# Patient Record
Sex: FEMALE | Race: WHITE | Employment: OTHER | URBAN - METROPOLITAN AREA
[De-identification: names, ages, dates, MRNs, and addresses within clinical notes are randomized per-mention and may not be internally consistent; named-entity substitution may affect disease eponyms.]

---

## 2017-04-13 ENCOUNTER — HOSPITAL ENCOUNTER (EMERGENCY)
Age: 22
Discharge: HOME | End: 2017-04-13
Payer: MEDICAID

## 2017-04-13 VITALS
TEMPERATURE: 98.2 F | HEART RATE: 82 BPM | DIASTOLIC BLOOD PRESSURE: 70 MMHG | RESPIRATION RATE: 18 BRPM | WEIGHT: 125 LBS | OXYGEN SATURATION: 100 % | SYSTOLIC BLOOD PRESSURE: 109 MMHG | BODY MASS INDEX: 22.15 KG/M2 | HEIGHT: 63 IN

## 2017-04-13 DIAGNOSIS — M54.32 SCIATICA OF LEFT SIDE: Primary | ICD-10-CM

## 2017-04-13 PROCEDURE — 6370000000 HC RX 637 (ALT 250 FOR IP): Performed by: NURSE PRACTITIONER

## 2017-04-13 PROCEDURE — 99283 EMERGENCY DEPT VISIT LOW MDM: CPT

## 2017-04-13 RX ORDER — HYDROCODONE BITARTRATE AND ACETAMINOPHEN 5; 325 MG/1; MG/1
1 TABLET ORAL EVERY 6 HOURS PRN
Qty: 10 TABLET | Refills: 0 | Status: SHIPPED | OUTPATIENT
Start: 2017-04-13 | End: 2017-04-20

## 2017-04-13 RX ORDER — HYDROCODONE BITARTRATE AND ACETAMINOPHEN 5; 325 MG/1; MG/1
1 TABLET ORAL ONCE
Status: COMPLETED | OUTPATIENT
Start: 2017-04-13 | End: 2017-04-13

## 2017-04-13 RX ORDER — CYCLOBENZAPRINE HCL 10 MG
10 TABLET ORAL 3 TIMES DAILY PRN
Qty: 15 TABLET | Refills: 0 | Status: SHIPPED | OUTPATIENT
Start: 2017-04-13 | End: 2018-03-18

## 2017-04-13 RX ORDER — CYCLOBENZAPRINE HCL 10 MG
10 TABLET ORAL ONCE
Status: COMPLETED | OUTPATIENT
Start: 2017-04-13 | End: 2017-04-13

## 2017-04-13 RX ORDER — NAPROXEN 500 MG/1
500 TABLET ORAL 2 TIMES DAILY
Qty: 20 TABLET | Refills: 0 | Status: SHIPPED | OUTPATIENT
Start: 2017-04-13 | End: 2018-03-18

## 2017-04-13 RX ORDER — NAPROXEN 500 MG/1
500 TABLET ORAL ONCE
Status: COMPLETED | OUTPATIENT
Start: 2017-04-13 | End: 2017-04-13

## 2017-04-13 RX ADMIN — NAPROXEN 500 MG: 500 TABLET ORAL at 18:57

## 2017-04-13 RX ADMIN — CYCLOBENZAPRINE HYDROCHLORIDE 10 MG: 10 TABLET, FILM COATED ORAL at 18:57

## 2017-04-13 RX ADMIN — HYDROCODONE BITARTATE AND ACETAMINOPHEN 1 TABLET: 5; 325 TABLET ORAL at 19:05

## 2017-04-13 ASSESSMENT — PAIN SCALES - GENERAL
PAINLEVEL_OUTOF10: 7
PAINLEVEL_OUTOF10: 8
PAINLEVEL_OUTOF10: 10

## 2017-04-13 ASSESSMENT — PAIN DESCRIPTION - ORIENTATION: ORIENTATION: LEFT

## 2017-04-13 ASSESSMENT — ENCOUNTER SYMPTOMS
BACK PAIN: 0
COUGH: 0
SHORTNESS OF BREATH: 0
ABDOMINAL PAIN: 0

## 2017-04-13 ASSESSMENT — PAIN DESCRIPTION - FREQUENCY: FREQUENCY: CONTINUOUS

## 2017-04-13 ASSESSMENT — PAIN DESCRIPTION - DESCRIPTORS: DESCRIPTORS: STABBING

## 2017-04-13 ASSESSMENT — PAIN DESCRIPTION - LOCATION: LOCATION: HIP

## 2017-04-24 ENCOUNTER — HOSPITAL ENCOUNTER (EMERGENCY)
Age: 22
Discharge: HOME | End: 2017-04-24

## 2017-04-24 ENCOUNTER — APPOINTMENT (OUTPATIENT)
Dept: ULTRASOUND IMAGING | Age: 22
End: 2017-04-24

## 2017-04-24 VITALS
DIASTOLIC BLOOD PRESSURE: 68 MMHG | SYSTOLIC BLOOD PRESSURE: 115 MMHG | HEART RATE: 71 BPM | WEIGHT: 125 LBS | RESPIRATION RATE: 17 BRPM | HEIGHT: 63 IN | OXYGEN SATURATION: 100 % | BODY MASS INDEX: 22.15 KG/M2 | TEMPERATURE: 98.9 F

## 2017-04-24 DIAGNOSIS — M54.32 BILATERAL SCIATICA: Primary | ICD-10-CM

## 2017-04-24 DIAGNOSIS — M54.31 BILATERAL SCIATICA: Primary | ICD-10-CM

## 2017-04-24 LAB
ALBUMIN SERPL-MCNC: 4.4 G/DL (ref 3.9–4.9)
ALP BLD-CCNC: 61 U/L (ref 40–130)
ALT SERPL-CCNC: 11 U/L (ref 0–33)
ANION GAP SERPL CALCULATED.3IONS-SCNC: 11 MEQ/L (ref 7–13)
AST SERPL-CCNC: 13 U/L (ref 0–35)
BILIRUB SERPL-MCNC: 0.2 MG/DL (ref 0–1.2)
BUN BLDV-MCNC: 14 MG/DL (ref 6–20)
CALCIUM SERPL-MCNC: 9.8 MG/DL (ref 8.6–10.2)
CHLORIDE BLD-SCNC: 103 MEQ/L (ref 98–107)
CO2: 25 MEQ/L (ref 22–29)
CREAT SERPL-MCNC: 0.82 MG/DL (ref 0.5–0.9)
GFR AFRICAN AMERICAN: >60
GFR NON-AFRICAN AMERICAN: >60
GLOBULIN: 2.5 G/DL (ref 2.3–3.5)
GLUCOSE BLD-MCNC: 76 MG/DL (ref 74–109)
MAGNESIUM: 2.2 MG/DL (ref 1.7–2.3)
POTASSIUM SERPL-SCNC: 3.8 MEQ/L (ref 3.5–5.1)
SODIUM BLD-SCNC: 139 MEQ/L (ref 132–144)
TOTAL PROTEIN: 6.9 G/DL (ref 6.4–8.1)

## 2017-04-24 PROCEDURE — 93970 EXTREMITY STUDY: CPT

## 2017-04-24 PROCEDURE — 80053 COMPREHEN METABOLIC PANEL: CPT

## 2017-04-24 PROCEDURE — 83735 ASSAY OF MAGNESIUM: CPT

## 2017-04-24 PROCEDURE — 99283 EMERGENCY DEPT VISIT LOW MDM: CPT

## 2017-04-24 PROCEDURE — 36415 COLL VENOUS BLD VENIPUNCTURE: CPT

## 2017-04-24 RX ORDER — HYDROCODONE BITARTRATE AND ACETAMINOPHEN 5; 325 MG/1; MG/1
1 TABLET ORAL EVERY 8 HOURS PRN
Qty: 15 TABLET | Refills: 0 | Status: SHIPPED | OUTPATIENT
Start: 2017-04-24 | End: 2017-05-01

## 2017-04-24 RX ORDER — ORPHENADRINE CITRATE 30 MG/ML
60 INJECTION INTRAMUSCULAR; INTRAVENOUS ONCE
Status: DISCONTINUED | OUTPATIENT
Start: 2017-04-24 | End: 2017-04-25 | Stop reason: HOSPADM

## 2017-04-24 RX ORDER — KETOROLAC TROMETHAMINE 30 MG/ML
60 INJECTION, SOLUTION INTRAMUSCULAR; INTRAVENOUS ONCE
Status: DISCONTINUED | OUTPATIENT
Start: 2017-04-24 | End: 2017-04-25 | Stop reason: HOSPADM

## 2017-04-24 RX ORDER — TIZANIDINE HYDROCHLORIDE 4 MG/1
4 CAPSULE, GELATIN COATED ORAL 3 TIMES DAILY
Qty: 21 CAPSULE | Refills: 0 | Status: SHIPPED | OUTPATIENT
Start: 2017-04-24 | End: 2018-03-18

## 2017-04-24 RX ORDER — PREDNISONE 10 MG/1
60 TABLET ORAL DAILY
Qty: 30 TABLET | Refills: 0 | Status: SHIPPED | OUTPATIENT
Start: 2017-04-24 | End: 2017-04-29

## 2017-04-24 RX ORDER — METHYLPREDNISOLONE ACETATE 80 MG/ML
80 INJECTION, SUSPENSION INTRA-ARTICULAR; INTRALESIONAL; INTRAMUSCULAR; SOFT TISSUE ONCE
Status: DISCONTINUED | OUTPATIENT
Start: 2017-04-24 | End: 2017-04-25 | Stop reason: HOSPADM

## 2017-04-24 ASSESSMENT — PAIN SCALES - GENERAL
PAINLEVEL_OUTOF10: 8
PAINLEVEL_OUTOF10: 8

## 2017-04-24 ASSESSMENT — ENCOUNTER SYMPTOMS
EYE PAIN: 0
COLOR CHANGE: 0
APNEA: 0
SHORTNESS OF BREATH: 0
ABDOMINAL PAIN: 0
TROUBLE SWALLOWING: 0
ALLERGIC/IMMUNOLOGIC NEGATIVE: 1

## 2017-04-24 ASSESSMENT — PAIN DESCRIPTION - LOCATION: LOCATION: BACK;LEG

## 2017-04-24 ASSESSMENT — PAIN DESCRIPTION - DESCRIPTORS: DESCRIPTORS: ACHING

## 2017-04-24 ASSESSMENT — PAIN DESCRIPTION - PAIN TYPE: TYPE: ACUTE PAIN

## 2017-04-24 ASSESSMENT — PAIN DESCRIPTION - ORIENTATION: ORIENTATION: RIGHT;LEFT;LOWER

## 2017-07-24 ENCOUNTER — APPOINTMENT (OUTPATIENT)
Dept: GENERAL RADIOLOGY | Age: 22
End: 2017-07-24
Payer: MEDICAID

## 2017-07-24 ENCOUNTER — HOSPITAL ENCOUNTER (EMERGENCY)
Age: 22
Discharge: HOME | End: 2017-07-24
Payer: MEDICAID

## 2017-07-24 VITALS
HEART RATE: 81 BPM | DIASTOLIC BLOOD PRESSURE: 76 MMHG | TEMPERATURE: 97.7 F | WEIGHT: 125 LBS | HEIGHT: 63 IN | SYSTOLIC BLOOD PRESSURE: 117 MMHG | OXYGEN SATURATION: 97 % | RESPIRATION RATE: 18 BRPM | BODY MASS INDEX: 22.15 KG/M2
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
## 2017-07-24 DIAGNOSIS — S20.219A CONTUSION OF CHEST WALL, UNSPECIFIED LATERALITY, INITIAL ENCOUNTER: ICD-10-CM

## 2017-07-24 PROCEDURE — 71020 XR CHEST STANDARD TWO VW: CPT

## 2017-07-24 PROCEDURE — 99283 EMERGENCY DEPT VISIT LOW MDM: CPT

## 2017-07-24 RX ORDER — KETOROLAC TROMETHAMINE 30 MG/ML
60 INJECTION, SOLUTION INTRAMUSCULAR; INTRAVENOUS ONCE
Status: DISCONTINUED | OUTPATIENT
Start: 2017-07-24 | End: 2017-07-24 | Stop reason: HOSPADM

## 2017-07-24 RX ORDER — IBUPROFEN 800 MG/1
800 TABLET ORAL EVERY 8 HOURS PRN
Qty: 12 TABLET | Refills: 0 | Status: SHIPPED | OUTPATIENT
Start: 2017-07-24 | End: 2018-03-18

## 2017-07-24 ASSESSMENT — PAIN DESCRIPTION - DESCRIPTORS: DESCRIPTORS: SORE

## 2017-07-24 ASSESSMENT — PAIN DESCRIPTION - ORIENTATION: ORIENTATION: MID

## 2017-07-24 ASSESSMENT — PAIN SCALES - GENERAL: PAINLEVEL_OUTOF10: 10

## 2017-07-24 ASSESSMENT — PAIN DESCRIPTION - LOCATION: LOCATION: CHEST

## 2017-07-24 ASSESSMENT — ENCOUNTER SYMPTOMS
SHORTNESS OF BREATH: 0
COUGH: 0

## 2017-07-24 ASSESSMENT — PAIN DESCRIPTION - PAIN TYPE: TYPE: ACUTE PAIN

## 2017-11-23 ENCOUNTER — HOSPITAL ENCOUNTER (EMERGENCY)
Age: 22
Discharge: HOME | End: 2017-11-23
Payer: MEDICAID

## 2017-11-23 VITALS
TEMPERATURE: 98.1 F | HEART RATE: 90 BPM | OXYGEN SATURATION: 98 % | RESPIRATION RATE: 18 BRPM | WEIGHT: 125 LBS | HEIGHT: 63 IN | DIASTOLIC BLOOD PRESSURE: 65 MMHG | BODY MASS INDEX: 22.15 KG/M2 | SYSTOLIC BLOOD PRESSURE: 110 MMHG

## 2017-11-23 DIAGNOSIS — J40 BRONCHITIS: Primary | ICD-10-CM

## 2017-11-23 DIAGNOSIS — F41.1 ANXIETY STATE: ICD-10-CM

## 2017-11-23 PROCEDURE — 99283 EMERGENCY DEPT VISIT LOW MDM: CPT

## 2017-11-23 RX ORDER — HYDROXYZINE PAMOATE 25 MG/1
25 CAPSULE ORAL 3 TIMES DAILY PRN
Qty: 10 CAPSULE | Refills: 0 | Status: SHIPPED | OUTPATIENT
Start: 2017-11-23 | End: 2017-11-26

## 2017-11-23 RX ORDER — AZITHROMYCIN 250 MG/1
TABLET, FILM COATED ORAL
Qty: 6 TABLET | Refills: 0 | Status: SHIPPED | OUTPATIENT
Start: 2017-11-23 | End: 2017-12-03

## 2017-11-23 ASSESSMENT — PAIN DESCRIPTION - LOCATION
LOCATION: THROAT
LOCATION: THROAT

## 2017-11-23 ASSESSMENT — ENCOUNTER SYMPTOMS
VOICE CHANGE: 0
WHEEZING: 0
COUGH: 1
ANAL BLEEDING: 0
NAUSEA: 0
APNEA: 0
BACK PAIN: 0
EYE DISCHARGE: 0
FACIAL SWELLING: 0
PHOTOPHOBIA: 0
ABDOMINAL DISTENTION: 0
STRIDOR: 0
VOMITING: 0

## 2017-11-23 ASSESSMENT — PAIN DESCRIPTION - PAIN TYPE
TYPE: ACUTE PAIN
TYPE: ACUTE PAIN

## 2017-11-23 ASSESSMENT — PAIN SCALES - GENERAL
PAINLEVEL_OUTOF10: 7
PAINLEVEL_OUTOF10: 7

## 2017-11-23 ASSESSMENT — PAIN DESCRIPTION - DESCRIPTORS: DESCRIPTORS: ACHING;BURNING

## 2017-11-23 ASSESSMENT — PAIN DESCRIPTION - FREQUENCY: FREQUENCY: CONTINUOUS

## 2017-11-24 NOTE — ED PROVIDER NOTES
asymmetry. Hematological: Does not bruise/bleed easily. Psychiatric/Behavioral: Negative for behavioral problems, self-injury and sleep disturbance. The patient is nervous/anxious. All other systems reviewed and are negative. Except as noted above the remainder of the review of systems was reviewed and negative. PAST MEDICAL HISTORY     Past Medical History:   Diagnosis Date    Asthma          SURGICAL HISTORY     History reviewed. No pertinent surgical history. CURRENT MEDICATIONS       Previous Medications    CYCLOBENZAPRINE (FLEXERIL) 10 MG TABLET    Take 1 tablet by mouth 3 times daily as needed for Muscle spasms    IBUPROFEN (ADVIL;MOTRIN) 800 MG TABLET    Take 1 tablet by mouth every 8 hours as needed for Pain or Fever    NAPROXEN (NAPROSYN) 500 MG TABLET    Take 1 tablet by mouth 2 times daily for 20 doses    TIZANIDINE (ZANAFLEX) 4 MG CAPSULE    Take 1 capsule by mouth 3 times daily       ALLERGIES     Review of patient's allergies indicates no known allergies. FAMILY HISTORY     History reviewed. No pertinent family history. SOCIAL HISTORY       Social History     Social History    Marital status: Single     Spouse name: N/A    Number of children: N/A    Years of education: N/A     Social History Main Topics    Smoking status: Current Every Day Smoker     Packs/day: 2.00     Years: 7.00     Types: Cigarettes    Smokeless tobacco: Never Used    Alcohol use Yes      Comment: socially    Drug use: No    Sexual activity: Yes     Partners: Male     Other Topics Concern    None     Social History Narrative    None       SCREENINGS             PHYSICAL EXAM    (up to 7 for level 4, 8 or more for level 5)     ED Triage Vitals [11/23/17 2148]   BP Temp Temp Source Pulse Resp SpO2 Height Weight   113/61 98.1 °F (36.7 °C) Oral 95 20 97 % 5' 3\" (1.6 m) 125 lb (56.7 kg)       Physical Exam   Constitutional: She is oriented to person, place, and time.  She appears well-developed and well-nourished. No distress. HENT:   Head: Normocephalic and atraumatic. Right Ear: External ear normal.   Left Ear: External ear normal.   Mouth/Throat: Oropharynx is clear and moist. No oropharyngeal exudate. Eyes: Pupils are equal, round, and reactive to light. Right eye exhibits no discharge. Left eye exhibits no discharge. Neck: Normal range of motion. Neck supple. Cardiovascular: Normal rate, regular rhythm, normal heart sounds and intact distal pulses. Pulmonary/Chest: Effort normal and breath sounds normal. No stridor. No respiratory distress. She has no wheezes. She has no rales. Abdominal: Soft. Bowel sounds are normal. She exhibits no distension. There is no tenderness. Musculoskeletal: Normal range of motion. Neurological: She is alert and oriented to person, place, and time. Skin: Skin is warm. No erythema. Psychiatric: She has a normal mood and affect. Nursing note and vitals reviewed. DIAGNOSTIC RESULTS     EKG: All EKG's are interpreted by the Emergency Department Physician who either signs or Co-signs this chart in the absence of a cardiologist.         RADIOLOGY:   Non-plain film images such as CT, Ultrasound and MRI are read by the radiologist. Plain radiographic images are visualized and preliminarily interpreted by the emergency physician with the below findings:         Interpretation per the Radiologist below, if available at the time of this note:    No orders to display         ED BEDSIDE ULTRASOUND:   Performed by ED Physician - none    LABS:  Labs Reviewed - No data to display    All other labs were within normal range or not returned as of this dictation.     EMERGENCY DEPARTMENT COURSE and DIFFERENTIAL DIAGNOSIS/MDM:   Vitals:    Vitals:    11/23/17 2148   BP: 113/61   Pulse: 95   Resp: 20   Temp: 98.1 °F (36.7 °C)   TempSrc: Oral   SpO2: 97%   Weight: 125 lb (56.7 kg)   Height: 5' 3\" (1.6 m)            MDM  Number of Diagnoses or Management Options  Anxiety state:   Bronchitis:   Diagnosis management comments: She states she has a history of panic attack she does have increasing stress currently we discussed hydroxyzine which may make her sleepy she cannot drive with this medication. Follow-up with primary care we discussed referrals were provided referral. And work note per patient      CRITICAL CARE TIME     CONSULTS:  None    PROCEDURES:  Unless otherwise noted below, none     Procedures    FINAL IMPRESSION      1. Bronchitis    2. Anxiety state          DISPOSITION/PLAN   DISPOSITION Decision to Discharge    PATIENT REFERRED TO:  No follow-up provider specified.     DISCHARGE MEDICATIONS:  New Prescriptions    AZITHROMYCIN (ZITHROMAX) 250 MG TABLET    2 TABS DAY 1 THEN 1 TAB DAYS 2-5    HYDROXYZINE (VISTARIL) 25 MG CAPSULE    Take 1 capsule by mouth 3 times daily as needed for Anxiety          (Please note that portions of this note were completed with a voice recognition program.  Efforts were made to edit the dictations but occasionally words are mis-transcribed.)    Mell De La Cruz PA-C (electronically signed)  Attending Emergency Physician         Mell De La Cruz PA-C  11/26/17 7426

## 2018-03-18 ENCOUNTER — HOSPITAL ENCOUNTER (EMERGENCY)
Age: 23
Discharge: HOME | End: 2018-03-18
Payer: MEDICAID

## 2018-03-18 VITALS
SYSTOLIC BLOOD PRESSURE: 113 MMHG | TEMPERATURE: 98 F | HEART RATE: 69 BPM | BODY MASS INDEX: 23.04 KG/M2 | DIASTOLIC BLOOD PRESSURE: 71 MMHG | WEIGHT: 130 LBS | OXYGEN SATURATION: 100 % | RESPIRATION RATE: 18 BRPM | HEIGHT: 63 IN

## 2018-03-18 DIAGNOSIS — L01.00 IMPETIGO: Primary | ICD-10-CM

## 2018-03-18 PROCEDURE — 99282 EMERGENCY DEPT VISIT SF MDM: CPT

## 2018-03-18 RX ORDER — SULFAMETHOXAZOLE AND TRIMETHOPRIM 800; 160 MG/1; MG/1
2 TABLET ORAL 2 TIMES DAILY
Qty: 56 TABLET | Refills: 0 | Status: SHIPPED | OUTPATIENT
Start: 2018-03-18 | End: 2018-04-01

## 2018-03-18 RX ORDER — IBUPROFEN 800 MG/1
800 TABLET ORAL EVERY 8 HOURS PRN
Qty: 20 TABLET | Refills: 0 | Status: SHIPPED | OUTPATIENT
Start: 2018-03-18 | End: 2018-10-19

## 2018-03-18 ASSESSMENT — PAIN DESCRIPTION - LOCATION: LOCATION: OTHER (COMMENT)

## 2018-03-18 ASSESSMENT — PAIN DESCRIPTION - ONSET: ONSET: ON-GOING

## 2018-03-18 ASSESSMENT — PAIN DESCRIPTION - PROGRESSION: CLINICAL_PROGRESSION: GRADUALLY IMPROVING

## 2018-03-18 ASSESSMENT — PAIN SCALES - GENERAL: PAINLEVEL_OUTOF10: 10

## 2018-03-18 ASSESSMENT — PAIN DESCRIPTION - FREQUENCY: FREQUENCY: INTERMITTENT

## 2018-03-18 ASSESSMENT — PAIN DESCRIPTION - PAIN TYPE: TYPE: ACUTE PAIN

## 2018-03-18 ASSESSMENT — PAIN DESCRIPTION - ORIENTATION: ORIENTATION: LEFT;RIGHT

## 2018-03-18 ASSESSMENT — PAIN DESCRIPTION - DESCRIPTORS: DESCRIPTORS: BURNING

## 2018-03-18 NOTE — ED TRIAGE NOTES
Patient arrived from home with complaint of rash under bilateral arm pits. Patient states \"I changed my deoadarant x1 week ago\". Patient A&OX3. Skin pink, warm, and dry. Dried scab under bilateral arm pit with blisters. Patient denies sob, chest pain, abd pain, headache, dizziness, and blurred vision.

## 2018-03-19 ENCOUNTER — HOSPITAL ENCOUNTER (EMERGENCY)
Age: 23
Discharge: HOME | End: 2018-03-19
Payer: MEDICAID

## 2018-03-19 VITALS
HEART RATE: 85 BPM | SYSTOLIC BLOOD PRESSURE: 118 MMHG | TEMPERATURE: 98.1 F | OXYGEN SATURATION: 98 % | BODY MASS INDEX: 22.15 KG/M2 | HEIGHT: 63 IN | RESPIRATION RATE: 14 BRPM | WEIGHT: 125 LBS | DIASTOLIC BLOOD PRESSURE: 65 MMHG

## 2018-03-19 DIAGNOSIS — L73.2 HYDRADENITIS: ICD-10-CM

## 2018-03-19 DIAGNOSIS — L02.419 AXILLARY ABSCESS: Primary | ICD-10-CM

## 2018-03-19 PROCEDURE — 99282 EMERGENCY DEPT VISIT SF MDM: CPT

## 2018-03-19 RX ORDER — MUPIROCIN CALCIUM 20 MG/G
CREAM TOPICAL
Qty: 1 TUBE | Refills: 0 | Status: SHIPPED | OUTPATIENT
Start: 2018-03-19 | End: 2018-04-18

## 2018-03-19 RX ORDER — HYDROCODONE BITARTRATE AND ACETAMINOPHEN 5; 325 MG/1; MG/1
1 TABLET ORAL EVERY 6 HOURS PRN
Qty: 12 TABLET | Refills: 0 | Status: SHIPPED | OUTPATIENT
Start: 2018-03-19 | End: 2018-03-22

## 2018-03-19 RX ORDER — ONDANSETRON 4 MG/1
4 TABLET, ORALLY DISINTEGRATING ORAL EVERY 8 HOURS PRN
Qty: 20 TABLET | Refills: 0 | Status: SHIPPED | OUTPATIENT
Start: 2018-03-19 | End: 2019-11-29

## 2018-03-19 ASSESSMENT — ENCOUNTER SYMPTOMS
SHORTNESS OF BREATH: 0
COLOR CHANGE: 0
APNEA: 0
EYE PAIN: 0
ALLERGIC/IMMUNOLOGIC NEGATIVE: 1
TROUBLE SWALLOWING: 0
ABDOMINAL PAIN: 0

## 2018-03-19 ASSESSMENT — PAIN DESCRIPTION - FREQUENCY: FREQUENCY: CONTINUOUS

## 2018-03-19 ASSESSMENT — PAIN DESCRIPTION - DESCRIPTORS: DESCRIPTORS: BURNING

## 2018-03-19 ASSESSMENT — PAIN DESCRIPTION - PAIN TYPE: TYPE: ACUTE PAIN

## 2018-03-19 ASSESSMENT — PAIN SCALES - GENERAL: PAINLEVEL_OUTOF10: 10

## 2018-03-19 ASSESSMENT — PAIN DESCRIPTION - ORIENTATION: ORIENTATION: RIGHT;LEFT

## 2018-03-19 NOTE — ED TRIAGE NOTES
A &o x4 female, multiple scabbed area in bilateral axillas, pt states they were draining pus earlier, & the pain is so bad it makes her cry & the Ibuprofen isn't helping, gait steady.

## 2018-03-19 NOTE — ED PROVIDER NOTES
noted above the remainder of the review of systems was reviewed and negative. PAST MEDICAL HISTORY     Past Medical History:   Diagnosis Date    Asthma          SURGICAL HISTORY     History reviewed. No pertinent surgical history. CURRENT MEDICATIONS       Previous Medications    IBUPROFEN (ADVIL;MOTRIN) 800 MG TABLET    Take 1 tablet by mouth every 8 hours as needed for Pain or Fever    SULFAMETHOXAZOLE-TRIMETHOPRIM (BACTRIM DS) 800-160 MG PER TABLET    Take 2 tablets by mouth 2 times daily for 14 days       ALLERGIES     Patient has no known allergies. FAMILY HISTORY     History reviewed. No pertinent family history. SOCIAL HISTORY       Social History     Social History    Marital status: Single     Spouse name: N/A    Number of children: N/A    Years of education: N/A     Social History Main Topics    Smoking status: Current Every Day Smoker     Packs/day: 2.00     Years: 7.00     Types: Cigarettes    Smokeless tobacco: Never Used    Alcohol use Yes      Comment: socially    Drug use: No    Sexual activity: Yes     Partners: Male     Other Topics Concern    None     Social History Narrative    None       SCREENINGS           PHYSICAL EXAM    (up to 7 for level 4, 8 or more for level 5)     ED Triage Vitals [03/19/18 1839]   BP Temp Temp Source Pulse Resp SpO2 Height Weight   118/65 98.1 °F (36.7 °C) Oral 85 14 98 % 5' 3\" (1.6 m) 125 lb (56.7 kg)       Physical Exam   Constitutional: She is oriented to person, place, and time. She appears well-developed and well-nourished. No distress. HENT:   Head: Normocephalic and atraumatic. Mouth/Throat: No oropharyngeal exudate. Eyes: Conjunctivae and EOM are normal. Pupils are equal, round, and reactive to light. No scleral icterus. Neck: Normal range of motion. Neck supple. No tracheal deviation present. Cardiovascular: Normal rate, normal heart sounds and intact distal pulses.     Pulmonary/Chest: Effort normal and breath sounds

## 2018-03-19 NOTE — ED PROVIDER NOTES
[facility] ED  eMERGENCY dEPARTMENT eNCOUnter      Pt Name: Aletha Cruz  MRN: [id number]  [place] 1995  Date of evaluation: 3/18/2018  Provider: Shanna Reich PA-C    CHIEF COMPLAINT       Chief Complaint   Patient presents with    Other     blisters/scabs under bilateral arm pits. patient also change deodarant. HISTORY OF PRESENT ILLNESS  (Location/Symptom, Timing/Onset, Context/Setting, Quality, Duration, Modifying Factors, Severity.)   Aletha Cruz is a 25 y.o. female who presents to the emergency department With a 2 to three-day history of bilateral axilla rash. Patient states that she shaved her armpits and noted a small ingrown hair week ago. Yesterday she noted 2 sores, one on each axilla. After work she noted a number of clear blisters around her axillae. The sores are noted to be painful. No discharge from the source. No fever. Additionally she has a small burn on her forearm which is about a week old. The history is provided by the patient. Nursing Notes were reviewed and I agree. REVIEW OF SYSTEMS    (2-9 systems for level 4, 10 or more for level 5)     Review of Systems   Constitutional: Negative for fever. Musculoskeletal: Negative for arthralgias. Skin: Positive for rash. Except as noted above the remainder of the review of systems was reviewed and negative. PAST MEDICAL HISTORY     Past Medical History:   Diagnosis Date    Asthma          SURGICAL HISTORY     History reviewed. No pertinent surgical history. CURRENT MEDICATIONS       Previous Medications    No medications on file       ALLERGIES     Patient has no known allergies. FAMILY HISTORY     History reviewed. No pertinent family history.        SOCIAL HISTORY       Social History     Social History    Marital status: Single     Spouse name: N/A    Number of children: N/A    Years of education: N/A     Social History Main Topics    Smoking status: Current Every dictation. EMERGENCY DEPARTMENT COURSE and DIFFERENTIAL DIAGNOSIS/MDM:   Vitals:    Vitals:    03/18/18 1910   BP: 113/71   Pulse: 69   Resp: 18   Temp: 98 °F (36.7 °C)   TempSrc: Oral   SpO2: 100%   Weight: 130 lb (59 kg)   Height: 5' 3\" (1.6 m)           MDM    Developing bolus impetigo to axilla bilaterally we'll start antibiotics and provide Motrin 800 for pain. She was counseled not to use any deodorant for the time being. She is also counseled not to shave her armpits until this infection is resolved. PROCEDURES:    Procedures      FINAL IMPRESSION      1.  Impetigo          DISPOSITION/PLAN   DISPOSITION Decision To Discharge 03/18/2018 08:00:48 PM      PATIENT REFERRED TO:  [address] [place] [address]    In 3 days        DISCHARGE MEDICATIONS:  New Prescriptions    IBUPROFEN (ADVIL;MOTRIN) 800 MG TABLET    Take 1 tablet by mouth every 8 hours as needed for Pain or Fever    SULFAMETHOXAZOLE-TRIMETHOPRIM (BACTRIM DS) 800-160 MG PER TABLET    Take 2 tablets by mouth 2 times daily for 14 days       (Please note that portions of this note were completed with a voice recognition program.  Efforts were made to edit the dictations but occasionally words are mis-transcribed.)    MU Hayden PA-C  03/18/18 2012

## 2018-09-27 ENCOUNTER — HOSPITAL ENCOUNTER (EMERGENCY)
Age: 23
Discharge: HOME | End: 2018-09-27
Payer: MEDICAID

## 2018-09-27 VITALS
WEIGHT: 125 LBS | OXYGEN SATURATION: 99 % | RESPIRATION RATE: 18 BRPM | DIASTOLIC BLOOD PRESSURE: 82 MMHG | BODY MASS INDEX: 22.15 KG/M2 | SYSTOLIC BLOOD PRESSURE: 125 MMHG | TEMPERATURE: 98.6 F | HEART RATE: 85 BPM | HEIGHT: 63 IN

## 2018-09-27 DIAGNOSIS — S39.012A STRAIN OF LUMBAR REGION, INITIAL ENCOUNTER: Primary | ICD-10-CM

## 2018-09-27 PROCEDURE — 6370000000 HC RX 637 (ALT 250 FOR IP): Performed by: NURSE PRACTITIONER

## 2018-09-27 PROCEDURE — 99282 EMERGENCY DEPT VISIT SF MDM: CPT

## 2018-09-27 PROCEDURE — 96374 THER/PROPH/DIAG INJ IV PUSH: CPT

## 2018-09-27 RX ORDER — CYCLOBENZAPRINE HCL 10 MG
10 TABLET ORAL ONCE
Status: COMPLETED | OUTPATIENT
Start: 2018-09-27 | End: 2018-09-27

## 2018-09-27 RX ORDER — METHYLPREDNISOLONE 4 MG/1
TABLET ORAL
Qty: 1 KIT | Refills: 0 | Status: SHIPPED | OUTPATIENT
Start: 2018-09-27 | End: 2018-10-03

## 2018-09-27 RX ORDER — HYDROCODONE BITARTRATE AND ACETAMINOPHEN 5; 325 MG/1; MG/1
1 TABLET ORAL EVERY 6 HOURS PRN
Qty: 10 TABLET | Refills: 0 | Status: SHIPPED | OUTPATIENT
Start: 2018-09-27 | End: 2018-09-30

## 2018-09-27 RX ORDER — CYCLOBENZAPRINE HCL 10 MG
10 TABLET ORAL 3 TIMES DAILY PRN
Qty: 10 TABLET | Refills: 0 | Status: SHIPPED | OUTPATIENT
Start: 2018-09-27 | End: 2018-10-07

## 2018-09-27 RX ORDER — METHYLPREDNISOLONE SODIUM SUCCINATE 125 MG/2ML
40 INJECTION, POWDER, LYOPHILIZED, FOR SOLUTION INTRAMUSCULAR; INTRAVENOUS ONCE
Status: DISCONTINUED | OUTPATIENT
Start: 2018-09-27 | End: 2018-09-27

## 2018-09-27 RX ORDER — HYDROCODONE BITARTRATE AND ACETAMINOPHEN 5; 325 MG/1; MG/1
1 TABLET ORAL ONCE
Status: COMPLETED | OUTPATIENT
Start: 2018-09-27 | End: 2018-09-27

## 2018-09-27 RX ADMIN — HYDROCODONE BITARTRATE AND ACETAMINOPHEN 1 TABLET: 5; 325 TABLET ORAL at 23:25

## 2018-09-27 RX ADMIN — CYCLOBENZAPRINE HYDROCHLORIDE 10 MG: 10 TABLET, FILM COATED ORAL at 23:26

## 2018-09-27 ASSESSMENT — ENCOUNTER SYMPTOMS
BACK PAIN: 1
NAUSEA: 0
COUGH: 0
CONSTIPATION: 0
ABDOMINAL PAIN: 0
TROUBLE SWALLOWING: 0
DIARRHEA: 0
VOICE CHANGE: 0
SORE THROAT: 0
VOMITING: 0
COLOR CHANGE: 0
SHORTNESS OF BREATH: 0

## 2018-09-27 ASSESSMENT — PAIN SCALES - GENERAL
PAINLEVEL_OUTOF10: 10
PAINLEVEL_OUTOF10: 10

## 2018-09-27 ASSESSMENT — PAIN DESCRIPTION - ORIENTATION: ORIENTATION: LOWER;UPPER

## 2018-09-27 ASSESSMENT — PAIN DESCRIPTION - PAIN TYPE: TYPE: ACUTE PAIN

## 2018-09-27 ASSESSMENT — PAIN DESCRIPTION - DESCRIPTORS: DESCRIPTORS: ACHING

## 2018-09-27 ASSESSMENT — PAIN DESCRIPTION - LOCATION: LOCATION: BACK
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
## 2018-10-19 ENCOUNTER — HOSPITAL ENCOUNTER (EMERGENCY)
Age: 23
Discharge: HOME | End: 2018-10-19
Payer: MEDICAID

## 2018-10-19 VITALS
SYSTOLIC BLOOD PRESSURE: 110 MMHG | BODY MASS INDEX: 23 KG/M2 | WEIGHT: 125 LBS | HEART RATE: 84 BPM | TEMPERATURE: 98.3 F | RESPIRATION RATE: 16 BRPM | DIASTOLIC BLOOD PRESSURE: 59 MMHG | HEIGHT: 62 IN | OXYGEN SATURATION: 99 %

## 2018-10-19 DIAGNOSIS — K04.7 DENTAL ABSCESS: Primary | ICD-10-CM

## 2018-10-19 DIAGNOSIS — K08.89 PAIN, DENTAL: ICD-10-CM

## 2018-10-19 DIAGNOSIS — K02.9 DENTAL CARIES: ICD-10-CM

## 2018-10-19 PROCEDURE — 99282 EMERGENCY DEPT VISIT SF MDM: CPT

## 2018-10-19 PROCEDURE — 6370000000 HC RX 637 (ALT 250 FOR IP): Performed by: NURSE PRACTITIONER

## 2018-10-19 RX ORDER — HYDROCODONE BITARTRATE AND ACETAMINOPHEN 5; 325 MG/1; MG/1
1 TABLET ORAL EVERY 6 HOURS PRN
Qty: 12 TABLET | Refills: 0 | Status: SHIPPED | OUTPATIENT
Start: 2018-10-19 | End: 2018-10-22

## 2018-10-19 RX ORDER — HYDROCODONE BITARTRATE AND ACETAMINOPHEN 5; 325 MG/1; MG/1
1 TABLET ORAL ONCE
Status: COMPLETED | OUTPATIENT
Start: 2018-10-19 | End: 2018-10-19

## 2018-10-19 RX ORDER — PENICILLIN V POTASSIUM 500 MG/1
500 TABLET ORAL 4 TIMES DAILY
Qty: 28 TABLET | Refills: 0 | Status: SHIPPED | OUTPATIENT
Start: 2018-10-19 | End: 2018-10-26

## 2018-10-19 RX ORDER — IBUPROFEN 800 MG/1
800 TABLET ORAL EVERY 8 HOURS PRN
Qty: 30 TABLET | Refills: 0 | Status: SHIPPED | OUTPATIENT
Start: 2018-10-19 | End: 2019-11-29 | Stop reason: ALTCHOICE

## 2018-10-19 RX ORDER — IBUPROFEN 800 MG/1
800 TABLET ORAL ONCE
Status: COMPLETED | OUTPATIENT
Start: 2018-10-19 | End: 2018-10-19

## 2018-10-19 RX ADMIN — HYDROCODONE BITARTRATE AND ACETAMINOPHEN 1 TABLET: 5; 325 TABLET ORAL at 21:22

## 2018-10-19 RX ADMIN — IBUPROFEN 800 MG: 800 TABLET ORAL at 21:22

## 2018-10-19 ASSESSMENT — PAIN DESCRIPTION - FREQUENCY: FREQUENCY: CONTINUOUS

## 2018-10-19 ASSESSMENT — PAIN DESCRIPTION - LOCATION: LOCATION: TEETH

## 2018-10-19 ASSESSMENT — PAIN DESCRIPTION - ORIENTATION: ORIENTATION: LEFT;RIGHT;LOWER

## 2018-10-19 ASSESSMENT — PAIN SCALES - GENERAL
PAINLEVEL_OUTOF10: 10
PAINLEVEL_OUTOF10: 10

## 2018-10-19 ASSESSMENT — ENCOUNTER SYMPTOMS
ABDOMINAL PAIN: 0
NAUSEA: 0
FACIAL SWELLING: 1
ABDOMINAL DISTENTION: 0
CHEST TIGHTNESS: 0
VOMITING: 0
TROUBLE SWALLOWING: 0
COLOR CHANGE: 0
COUGH: 0
CONSTIPATION: 0
RHINORRHEA: 0
SORE THROAT: 0
SHORTNESS OF BREATH: 0
DIARRHEA: 0
WHEEZING: 0
BACK PAIN: 0

## 2018-10-19 ASSESSMENT — PAIN DESCRIPTION - DESCRIPTORS: DESCRIPTORS: ACHING;THROBBING

## 2018-10-19 ASSESSMENT — PAIN DESCRIPTION - PAIN TYPE: TYPE: ACUTE PAIN

## 2018-10-20 NOTE — ED PROVIDER NOTES
[facility] [facility] ED  eMERGENCY dEPARTMENT eNCOUnter      Pt Name: Ronal Moreno  MRN: [id number]  [place] 1995  Date of evaluation: 10/19/2018  Provider: COLLINS Chapman [id number]       Chief Complaint   Patient presents with    Dental Pain     pt c/o tooth pain on the lower left and right side for the past 2 days         HISTORY OF PRESENT ILLNESS   (Location/Symptom, Timing/Onset,Context/Setting, Quality, Duration, Modifying Factors, Severity)  Note limiting factors. Ronal Moreno is a 21 y.o. female who presents to the emergency department For complaint of 2 days of increasing lower jaw pain left and right sides tooth pain and facial swelling. Patient states she has a known decayed and fractured tooth and has had previous problems. She states that she's having severe sensitivity to heat and cold and difficulty with chewing. She states pain is currently 10 out of 10 aching throbbing sensation worsened the left lower molar. She states that she began to have facial swelling earlier today around early afternoon. She denies any bleeding or discharge and states she doesn't a foul taste in her mouth since this afternoon. She denies any headache fevers dizziness. She denies any nausea vomiting diarrhea. She states that she is able to eat and drink well she has no difficulty swallowing. Nursing Notes were reviewed. REVIEW OF SYSTEMS    (2-9 systems for level 4, 10 or more for level 5)     Review of Systems   Constitutional: Negative for activity change, appetite change, chills, diaphoresis, fatigue and fever. HENT: Positive for dental problem and facial swelling. Negative for congestion, ear pain, rhinorrhea, sore throat and trouble swallowing. Respiratory: Negative for cough, chest tightness, shortness of breath and wheezing. Cardiovascular: Negative for chest pain and palpitations.    Gastrointestinal: Negative for abdominal distention, abdominal pain, constipation, diarrhea, nausea and vomiting. Genitourinary: Negative for difficulty urinating and flank pain. Musculoskeletal: Negative for back pain, myalgias, neck pain and neck stiffness. Skin: Negative for color change and rash. Neurological: Negative for dizziness, tremors, seizures, syncope, weakness, light-headedness and headaches. Except as noted above the remainder of the review of systems was reviewed and negative. PAST MEDICAL HISTORY     Past Medical History:   Diagnosis Date    Asthma      History reviewed. No pertinent surgical history. Social History     Social History    Marital status: Single     Spouse name: N/A    Number of children: N/A    Years of education: N/A     Social History Main Topics    Smoking status: Current Every Day Smoker     Packs/day: 2.00     Years: 7.00     Types: Cigarettes    Smokeless tobacco: Never Used    Alcohol use Yes      Comment: socially    Drug use: No    Sexual activity: Yes     Partners: Male     Other Topics Concern    None     Social History Narrative    None       SCREENINGS      @FLOW([id number])@      PHYSICAL EXAM    (up to 7 for level 4, 8 or more for level 5)     ED Triage Vitals [10/19/18 2049]   BP Temp Temp Source Pulse Resp SpO2 Height Weight   (!) 110/59 98.3 °F (36.8 °C) Oral 84 16 99 % 5' 2\" (1.575 m) 125 lb (56.7 kg)       Physical Exam   Constitutional: She appears well-developed and well-nourished. No distress. HENT:   Head: Normocephalic and atraumatic. Right Ear: Hearing and external ear normal.   Left Ear: Hearing and external ear normal.   Nose: Nose normal.   Mouth/Throat: Oropharynx is clear and moist and mucous membranes are normal. No trismus in the jaw. Abnormal dentition. Dental abscesses and dental caries present. Eyes: Pupils are equal, round, and reactive to light. Conjunctivae are normal.   Neck: Normal range of motion. Neck supple. Cardiovascular: Normal rate and regular rhythm.

## 2019-11-22 ENCOUNTER — APPOINTMENT (OUTPATIENT)
Dept: GENERAL RADIOLOGY | Age: 24
End: 2019-11-22
Payer: MEDICARE

## 2019-11-22 ENCOUNTER — HOSPITAL ENCOUNTER (EMERGENCY)
Age: 24
Discharge: HOME | End: 2019-11-22
Payer: MEDICARE

## 2019-11-22 VITALS
WEIGHT: 135 LBS | DIASTOLIC BLOOD PRESSURE: 77 MMHG | HEART RATE: 71 BPM | RESPIRATION RATE: 16 BRPM | OXYGEN SATURATION: 99 % | SYSTOLIC BLOOD PRESSURE: 118 MMHG | TEMPERATURE: 97.9 F | HEIGHT: 62 IN | BODY MASS INDEX: 24.84 KG/M2

## 2019-11-22 DIAGNOSIS — S16.1XXA ACUTE STRAIN OF NECK MUSCLE, INITIAL ENCOUNTER: ICD-10-CM

## 2019-11-22 DIAGNOSIS — S29.019A THORACIC MYOFASCIAL STRAIN, INITIAL ENCOUNTER: Primary | ICD-10-CM

## 2019-11-22 PROCEDURE — 72050 X-RAY EXAM NECK SPINE 4/5VWS: CPT

## 2019-11-22 PROCEDURE — 99283 EMERGENCY DEPT VISIT LOW MDM: CPT

## 2019-11-22 PROCEDURE — 6370000000 HC RX 637 (ALT 250 FOR IP): Performed by: PHYSICIAN ASSISTANT

## 2019-11-22 PROCEDURE — 72072 X-RAY EXAM THORAC SPINE 3VWS: CPT

## 2019-11-22 RX ORDER — NAPROXEN 250 MG/1
250 TABLET ORAL 2 TIMES DAILY WITH MEALS
Qty: 14 TABLET | Refills: 0 | Status: SHIPPED | OUTPATIENT
Start: 2019-11-22 | End: 2019-11-29

## 2019-11-22 RX ORDER — CAPSAICIN 0.025 %
CREAM (GRAM) TOPICAL
Qty: 1 TUBE | Refills: 1 | Status: SHIPPED | OUTPATIENT
Start: 2019-11-22 | End: 2019-12-22

## 2019-11-22 RX ORDER — CHLORZOXAZONE 500 MG/1
500 TABLET ORAL 3 TIMES DAILY PRN
Qty: 15 TABLET | Refills: 0 | Status: SHIPPED | OUTPATIENT
Start: 2019-11-22 | End: 2019-11-29

## 2019-11-22 RX ORDER — NAPROXEN 500 MG/1
500 TABLET ORAL ONCE
Status: COMPLETED | OUTPATIENT
Start: 2019-11-22 | End: 2019-11-22

## 2019-11-22 RX ORDER — CYCLOBENZAPRINE HCL 10 MG
10 TABLET ORAL ONCE
Status: COMPLETED | OUTPATIENT
Start: 2019-11-22 | End: 2019-11-22

## 2019-11-22 RX ADMIN — CYCLOBENZAPRINE 10 MG: 10 TABLET, FILM COATED ORAL at 13:26

## 2019-11-22 RX ADMIN — NAPROXEN 500 MG: 500 TABLET ORAL at 13:26

## 2019-11-22 ASSESSMENT — ENCOUNTER SYMPTOMS
BACK PAIN: 1
RESPIRATORY NEGATIVE: 1
GASTROINTESTINAL NEGATIVE: 1
EYES NEGATIVE: 1

## 2019-11-22 ASSESSMENT — PAIN DESCRIPTION - PAIN TYPE: TYPE: ACUTE PAIN

## 2019-11-22 ASSESSMENT — PAIN DESCRIPTION - LOCATION: LOCATION: BACK;NECK

## 2019-11-22 ASSESSMENT — PAIN DESCRIPTION - FREQUENCY: FREQUENCY: CONTINUOUS

## 2019-11-22 ASSESSMENT — PAIN SCALES - GENERAL
PAINLEVEL_OUTOF10: 10
PAINLEVEL_OUTOF10: 9

## 2019-11-22 ASSESSMENT — PAIN DESCRIPTION - DESCRIPTORS: DESCRIPTORS: SHARP;SHOOTING

## 2019-11-29 ENCOUNTER — OFFICE VISIT (OUTPATIENT)
Dept: FAMILY MEDICINE CLINIC | Age: 24
End: 2019-11-29
Payer: MEDICARE

## 2019-11-29 VITALS
TEMPERATURE: 97.7 F | SYSTOLIC BLOOD PRESSURE: 108 MMHG | BODY MASS INDEX: 24.84 KG/M2 | HEIGHT: 62 IN | RESPIRATION RATE: 16 BRPM | HEART RATE: 66 BPM | DIASTOLIC BLOOD PRESSURE: 68 MMHG | WEIGHT: 135 LBS

## 2019-11-29 DIAGNOSIS — S16.1XXA STRAIN OF NECK MUSCLE, INITIAL ENCOUNTER: ICD-10-CM

## 2019-11-29 DIAGNOSIS — Z23 FLU VACCINE NEED: ICD-10-CM

## 2019-11-29 DIAGNOSIS — S29.019A THORACIC MYOFASCIAL STRAIN, INITIAL ENCOUNTER: Primary | ICD-10-CM

## 2019-11-29 PROCEDURE — 90686 IIV4 VACC NO PRSV 0.5 ML IM: CPT | Performed by: FAMILY MEDICINE

## 2019-11-29 PROCEDURE — 90471 IMMUNIZATION ADMIN: CPT | Performed by: FAMILY MEDICINE

## 2019-11-29 PROCEDURE — 99203 OFFICE O/P NEW LOW 30 MIN: CPT | Performed by: FAMILY MEDICINE

## 2019-11-29 RX ORDER — ONDANSETRON 4 MG/1
4 TABLET, FILM COATED ORAL 3 TIMES DAILY PRN
Qty: 30 TABLET | Refills: 0 | Status: SHIPPED | OUTPATIENT
Start: 2019-11-29 | End: 2020-07-07

## 2019-11-29 RX ORDER — NAPROXEN 500 MG/1
500 TABLET ORAL 2 TIMES DAILY PRN
Qty: 60 TABLET | Refills: 1 | Status: SHIPPED | OUTPATIENT
Start: 2019-11-29 | End: 2020-02-28 | Stop reason: SDUPTHER

## 2019-11-29 RX ORDER — CYCLOBENZAPRINE HCL 10 MG
10 TABLET ORAL 3 TIMES DAILY PRN
Qty: 30 TABLET | Refills: 0 | Status: SHIPPED | OUTPATIENT
Start: 2019-11-29 | End: 2019-12-30 | Stop reason: SDUPTHER

## 2019-11-29 ASSESSMENT — PATIENT HEALTH QUESTIONNAIRE - PHQ9: DEPRESSION UNABLE TO ASSESS: FUNCTIONAL CAPACITY MOTIVATION LIMITS ACCURACY

## 2019-12-06 ENCOUNTER — HOSPITAL ENCOUNTER (OUTPATIENT)
Dept: PHYSICAL THERAPY | Age: 24
Setting detail: THERAPIES SERIES
Discharge: HOME | End: 2019-12-06
Payer: MEDICARE

## 2019-12-06 PROCEDURE — G0283 ELEC STIM OTHER THAN WOUND: HCPCS

## 2019-12-06 PROCEDURE — 97162 PT EVAL MOD COMPLEX 30 MIN: CPT

## 2019-12-06 ASSESSMENT — PAIN DESCRIPTION - LOCATION: LOCATION: BACK;NECK

## 2019-12-06 ASSESSMENT — PAIN DESCRIPTION - PROGRESSION: CLINICAL_PROGRESSION: GRADUALLY WORSENING

## 2019-12-06 ASSESSMENT — PAIN SCALES - GENERAL: PAINLEVEL_OUTOF10: 9

## 2019-12-06 ASSESSMENT — PAIN DESCRIPTION - ONSET: ONSET: SUDDEN

## 2019-12-06 ASSESSMENT — PAIN - FUNCTIONAL ASSESSMENT: PAIN_FUNCTIONAL_ASSESSMENT: PREVENTS OR INTERFERES WITH ALL ACTIVE AND SOME PASSIVE ACTIVITIES

## 2019-12-06 ASSESSMENT — PAIN DESCRIPTION - FREQUENCY: FREQUENCY: CONTINUOUS

## 2019-12-06 ASSESSMENT — PAIN DESCRIPTION - ORIENTATION: ORIENTATION: MID;UPPER

## 2019-12-06 ASSESSMENT — PAIN DESCRIPTION - PAIN TYPE: TYPE: ACUTE PAIN

## 2019-12-06 ASSESSMENT — PAIN DESCRIPTION - DESCRIPTORS: DESCRIPTORS: SHARP;STABBING

## 2019-12-11 ENCOUNTER — HOSPITAL ENCOUNTER (OUTPATIENT)
Dept: PHYSICAL THERAPY | Age: 24
Setting detail: THERAPIES SERIES
Discharge: HOME | End: 2019-12-11
Payer: MEDICARE

## 2019-12-11 ASSESSMENT — PAIN DESCRIPTION - PROGRESSION: CLINICAL_PROGRESSION: GRADUALLY WORSENING

## 2019-12-13 ENCOUNTER — HOSPITAL ENCOUNTER (OUTPATIENT)
Dept: PHYSICAL THERAPY | Age: 24
Setting detail: THERAPIES SERIES
Discharge: HOME | End: 2019-12-13
Payer: MEDICARE

## 2019-12-13 PROCEDURE — 97140 MANUAL THERAPY 1/> REGIONS: CPT

## 2019-12-13 PROCEDURE — 97110 THERAPEUTIC EXERCISES: CPT

## 2019-12-13 ASSESSMENT — PAIN DESCRIPTION - PAIN TYPE: TYPE: ACUTE PAIN

## 2019-12-13 ASSESSMENT — PAIN DESCRIPTION - FREQUENCY: FREQUENCY: CONTINUOUS

## 2019-12-13 ASSESSMENT — PAIN DESCRIPTION - LOCATION: LOCATION: BACK;NECK

## 2019-12-13 ASSESSMENT — PAIN SCALES - GENERAL: PAINLEVEL_OUTOF10: 9

## 2019-12-13 ASSESSMENT — PAIN DESCRIPTION - PROGRESSION: CLINICAL_PROGRESSION: GRADUALLY WORSENING

## 2019-12-20 ENCOUNTER — HOSPITAL ENCOUNTER (OUTPATIENT)
Dept: PHYSICAL THERAPY | Age: 24
Setting detail: THERAPIES SERIES
Discharge: HOME | End: 2019-12-20
Payer: MEDICARE

## 2019-12-20 PROCEDURE — 97110 THERAPEUTIC EXERCISES: CPT

## 2019-12-20 PROCEDURE — 97140 MANUAL THERAPY 1/> REGIONS: CPT

## 2019-12-20 ASSESSMENT — PAIN SCALES - GENERAL: PAINLEVEL_OUTOF10: 9

## 2019-12-20 ASSESSMENT — PAIN DESCRIPTION - ORIENTATION: ORIENTATION: MID;UPPER

## 2019-12-20 ASSESSMENT — PAIN DESCRIPTION - LOCATION: LOCATION: BACK;NECK

## 2019-12-20 ASSESSMENT — PAIN DESCRIPTION - PAIN TYPE: TYPE: ACUTE PAIN

## 2019-12-23 ENCOUNTER — HOSPITAL ENCOUNTER (OUTPATIENT)
Dept: PHYSICAL THERAPY | Age: 24
Setting detail: THERAPIES SERIES
Discharge: HOME | End: 2019-12-23
Payer: MEDICARE

## 2019-12-23 PROCEDURE — 97140 MANUAL THERAPY 1/> REGIONS: CPT

## 2019-12-23 PROCEDURE — 97110 THERAPEUTIC EXERCISES: CPT

## 2019-12-23 PROCEDURE — 97035 APP MDLTY 1+ULTRASOUND EA 15: CPT

## 2019-12-23 ASSESSMENT — PAIN DESCRIPTION - ORIENTATION: ORIENTATION: MID;UPPER

## 2019-12-23 ASSESSMENT — PAIN DESCRIPTION - FREQUENCY: FREQUENCY: CONTINUOUS

## 2019-12-23 ASSESSMENT — PAIN SCALES - GENERAL: PAINLEVEL_OUTOF10: 10

## 2019-12-23 ASSESSMENT — PAIN DESCRIPTION - PAIN TYPE: TYPE: ACUTE PAIN

## 2019-12-23 ASSESSMENT — PAIN DESCRIPTION - DESCRIPTORS: DESCRIPTORS: SHARP;STABBING

## 2019-12-23 ASSESSMENT — PAIN DESCRIPTION - LOCATION: LOCATION: NECK;BACK

## 2019-12-23 ASSESSMENT — PAIN DESCRIPTION - ONSET: ONSET: SUDDEN

## 2019-12-27 ENCOUNTER — HOSPITAL ENCOUNTER (OUTPATIENT)
Dept: PHYSICAL THERAPY | Age: 24
Setting detail: THERAPIES SERIES
Discharge: HOME | End: 2019-12-27
Payer: MEDICARE

## 2019-12-27 PROCEDURE — 97110 THERAPEUTIC EXERCISES: CPT

## 2019-12-27 ASSESSMENT — PAIN SCALES - GENERAL: PAINLEVEL_OUTOF10: 10

## 2019-12-27 ASSESSMENT — PAIN DESCRIPTION - LOCATION: LOCATION: NECK;BACK

## 2019-12-27 ASSESSMENT — PAIN DESCRIPTION - PAIN TYPE: TYPE: ACUTE PAIN

## 2019-12-27 ASSESSMENT — PAIN DESCRIPTION - ORIENTATION: ORIENTATION: MID;LOWER;LEFT;RIGHT

## 2019-12-30 ENCOUNTER — HOSPITAL ENCOUNTER (OUTPATIENT)
Dept: PHYSICAL THERAPY | Age: 24
Setting detail: THERAPIES SERIES
Discharge: HOME | End: 2019-12-30
Payer: MEDICARE

## 2019-12-30 ENCOUNTER — OFFICE VISIT (OUTPATIENT)
Dept: FAMILY MEDICINE CLINIC | Age: 24
End: 2019-12-30
Payer: MEDICARE

## 2019-12-30 VITALS
WEIGHT: 132 LBS | HEART RATE: 104 BPM | HEIGHT: 62 IN | DIASTOLIC BLOOD PRESSURE: 62 MMHG | SYSTOLIC BLOOD PRESSURE: 118 MMHG | RESPIRATION RATE: 16 BRPM | BODY MASS INDEX: 24.29 KG/M2 | TEMPERATURE: 97.5 F

## 2019-12-30 DIAGNOSIS — F43.10 PTSD (POST-TRAUMATIC STRESS DISORDER): Primary | ICD-10-CM

## 2019-12-30 DIAGNOSIS — F17.200 TOBACCO USE DISORDER: Chronic | ICD-10-CM

## 2019-12-30 DIAGNOSIS — J34.89 NASAL SEPTAL DEFECT: Chronic | ICD-10-CM

## 2019-12-30 DIAGNOSIS — S29.019D THORACIC MYOFASCIAL STRAIN, SUBSEQUENT ENCOUNTER: ICD-10-CM

## 2019-12-30 DIAGNOSIS — F31.32 BIPOLAR AFFECTIVE DISORDER, CURRENTLY DEPRESSED, MODERATE (HCC): Chronic | ICD-10-CM

## 2019-12-30 DIAGNOSIS — R07.89 ATYPICAL CHEST PAIN: Chronic | ICD-10-CM

## 2019-12-30 DIAGNOSIS — S16.1XXD STRAIN OF NECK MUSCLE, SUBSEQUENT ENCOUNTER: ICD-10-CM

## 2019-12-30 DIAGNOSIS — R06.02 SHORTNESS OF BREATH: Chronic | ICD-10-CM

## 2019-12-30 PROBLEM — S29.019A THORACIC MYOFASCIAL STRAIN: Status: ACTIVE | Noted: 2019-12-30

## 2019-12-30 PROBLEM — S16.1XXA CERVICAL STRAIN: Status: ACTIVE | Noted: 2019-12-30

## 2019-12-30 PROBLEM — F33.1 MODERATE EPISODE OF RECURRENT MAJOR DEPRESSIVE DISORDER (HCC): Chronic | Status: ACTIVE | Noted: 2019-12-30

## 2019-12-30 PROCEDURE — G8420 CALC BMI NORM PARAMETERS: HCPCS | Performed by: FAMILY MEDICINE

## 2019-12-30 PROCEDURE — 99215 OFFICE O/P EST HI 40 MIN: CPT | Performed by: FAMILY MEDICINE

## 2019-12-30 PROCEDURE — 4004F PT TOBACCO SCREEN RCVD TLK: CPT | Performed by: FAMILY MEDICINE

## 2019-12-30 PROCEDURE — 97035 APP MDLTY 1+ULTRASOUND EA 15: CPT

## 2019-12-30 PROCEDURE — G8482 FLU IMMUNIZE ORDER/ADMIN: HCPCS | Performed by: FAMILY MEDICINE

## 2019-12-30 PROCEDURE — 97110 THERAPEUTIC EXERCISES: CPT

## 2019-12-30 PROCEDURE — G8427 DOCREV CUR MEDS BY ELIG CLIN: HCPCS | Performed by: FAMILY MEDICINE

## 2019-12-30 PROCEDURE — 97140 MANUAL THERAPY 1/> REGIONS: CPT

## 2019-12-30 RX ORDER — CYCLOBENZAPRINE HCL 10 MG
10 TABLET ORAL 3 TIMES DAILY PRN
Qty: 30 TABLET | Refills: 0 | Status: SHIPPED | OUTPATIENT
Start: 2019-12-30 | End: 2020-02-28 | Stop reason: SDUPTHER

## 2019-12-30 RX ORDER — TERAZOSIN 1 MG/1
1 CAPSULE ORAL NIGHTLY
Qty: 30 CAPSULE | Refills: 3 | Status: SHIPPED | OUTPATIENT
Start: 2019-12-30 | End: 2020-02-14

## 2019-12-30 ASSESSMENT — PAIN DESCRIPTION - DESCRIPTORS: DESCRIPTORS: SHARP;STABBING

## 2019-12-30 ASSESSMENT — PAIN DESCRIPTION - PAIN TYPE: TYPE: ACUTE PAIN

## 2019-12-30 ASSESSMENT — PAIN DESCRIPTION - LOCATION: LOCATION: BACK;NECK;SHOULDER

## 2019-12-30 ASSESSMENT — PAIN SCALES - GENERAL: PAINLEVEL_OUTOF10: 8

## 2020-01-03 ENCOUNTER — HOSPITAL ENCOUNTER (OUTPATIENT)
Dept: PHYSICAL THERAPY | Age: 25
Setting detail: THERAPIES SERIES
Discharge: HOME | End: 2020-01-03
Payer: MEDICARE

## 2020-01-03 NOTE — PROGRESS NOTES
[address]       Physical Therapy  Cancellation/No-show Note  Patient Name:  Jacki Bay  :  1995   Date:  1/3/2020  Referring Practitioner: Felicia Mcfarland  Diagnosis: Thoracic myofascial strain, strain of neck mm     Visit Information:  PT Visit Information  PT Insurance Information: [employer] Insurance   Total # of Visits to Date: 6  No Show: 1  Canceled Appointment: 1  Progress Note Counter: - (cx on 1/3/20)    For today's appointment patient:  [x]  Cancelled  []  Rescheduled appointment  []  No-show   []  Called pt to remind of next appointment     Reason given by patient:  []  Patient ill  [x]  Conflicting appointment  []  No transportation    []  Conflict with work  []  No reason given  []  Other:       Comments:       Signature: Electronically signed by Greg Greene PTA on 1/3/20 at 10:03 AM

## 2020-01-09 ENCOUNTER — HOSPITAL ENCOUNTER (OUTPATIENT)
Dept: PHYSICAL THERAPY | Age: 25
Setting detail: THERAPIES SERIES
Discharge: HOME | End: 2020-01-09
Payer: MEDICARE

## 2020-01-09 PROCEDURE — 97110 THERAPEUTIC EXERCISES: CPT

## 2020-01-09 PROCEDURE — 97140 MANUAL THERAPY 1/> REGIONS: CPT

## 2020-01-09 PROCEDURE — 97035 APP MDLTY 1+ULTRASOUND EA 15: CPT

## 2020-01-09 ASSESSMENT — PAIN DESCRIPTION - PAIN TYPE: TYPE: ACUTE PAIN

## 2020-01-09 ASSESSMENT — PAIN DESCRIPTION - LOCATION: LOCATION: NECK

## 2020-01-09 ASSESSMENT — PAIN SCALES - GENERAL: PAINLEVEL_OUTOF10: 9

## 2020-01-09 ASSESSMENT — PAIN DESCRIPTION - FREQUENCY: FREQUENCY: CONTINUOUS

## 2020-01-09 NOTE — PROGRESS NOTES
tightness palpated during manual this date. Patient continues to require cuing for postural awareness. Decreased pain post manual/ modalities. Treatment Diagnosis: increased R neck, upperback, and shoulder pain, decreased cervical arom, R shoulder abd/flex arom, decreased R UE strength, increased cervical/thoracic mm tightness with mm guarding d/t pain, decreased activity tolerance, (+) cervical compression and distraction    Goals:  Short term goals  Time Frame for Short term goals: 2-3 weeks  Short term goal 1: The pt will demo improved postural awareness requiring </=25% VC's   Short term goal 2: The pt will report decreased R shoulder, cervical, and upper back pain </=5/10 at worst in order to perform functional ADL's    Long term goals  Time Frame for Long term goals : 4-6 weeks   Long term goal 1: The pt will be indep/compliant with HEP in order to self manage symptoms upon D/C   Long term goal 2: The pt will have a decrease in NDI score >/=10 points in order to increase functional activity tolerance   Long term goal 3: The pt will demonstrate improved R UE strength >/=4/5 in order to lift/carry with decreased pain   Long term goal 4: The pt will demo improved cervical AROM >/=10* flex/abd AROM>/=140* in order to increase ease with ADL's  Progress toward goals:continue towards all     POST-PAIN       Pain Rating (0-10 pain scale):   7/10   Location and pain description same as pre-treatment unless indicated. Action: [] NA   [x] Perform HEP  [x] Meds as prescribed  [] Modalities as prescribed   [] Call Physician     Frequency/Duration:  Plan  Times per week: 2  Plan weeks: 4-6  Current Treatment Recommendations: Strengthening, ROM, Manual Therapy - Joint Manipulation, Patient/Caregiver Education & Training, Pain Management, Modalities, Home Exercise Program, Manual Therapy - Soft Tissue Mobilization, Integrated Dry Needling     Pt to continue current HEP.   See objective section for any therapeutic exercise changes, additions or modifications this date.     PT Individual Minutes  Time In: 1120  Time Out: 1213  Minutes: 53  Timed Code Treatment Minutes: 53 Minutes  Procedure Minutes:10 HP during US/RAMBO    Timed Activity Minutes Units   Ther Ex 30 2   US 8 1   Manual  15 1       Signature:  Electronically signed by Gorge Mckeon PTA on 1/9/20 at 2:37 PM

## 2020-01-20 ENCOUNTER — HOSPITAL ENCOUNTER (EMERGENCY)
Age: 25
Discharge: HOME | End: 2020-01-20
Payer: MEDICARE

## 2020-01-20 VITALS
HEART RATE: 100 BPM | RESPIRATION RATE: 16 BRPM | SYSTOLIC BLOOD PRESSURE: 112 MMHG | TEMPERATURE: 98 F | DIASTOLIC BLOOD PRESSURE: 74 MMHG | OXYGEN SATURATION: 96 % | BODY MASS INDEX: 24.84 KG/M2 | WEIGHT: 135 LBS | HEIGHT: 62 IN

## 2020-01-20 LAB
INFLUENZA A BY PCR: NEGATIVE
INFLUENZA B BY PCR: NEGATIVE
STREP GRP A PCR: NEGATIVE

## 2020-01-20 PROCEDURE — 87502 INFLUENZA DNA AMP PROBE: CPT

## 2020-01-20 PROCEDURE — 99283 EMERGENCY DEPT VISIT LOW MDM: CPT

## 2020-01-20 PROCEDURE — 87651 STREP A DNA AMP PROBE: CPT

## 2020-01-20 RX ORDER — AZITHROMYCIN 250 MG/1
TABLET, FILM COATED ORAL
Qty: 6 TABLET | Refills: 0 | Status: SHIPPED | OUTPATIENT
Start: 2020-01-20 | End: 2020-01-30

## 2020-01-20 RX ORDER — ALBUTEROL SULFATE 90 UG/1
AEROSOL, METERED RESPIRATORY (INHALATION)
Qty: 1 INHALER | Refills: 1 | Status: SHIPPED | OUTPATIENT
Start: 2020-01-20 | End: 2020-02-28 | Stop reason: SDUPTHER

## 2020-01-20 ASSESSMENT — ENCOUNTER SYMPTOMS
DIARRHEA: 0
VOMITING: 0
COUGH: 1
SORE THROAT: 1
SINUS PAIN: 1
NAUSEA: 0
SHORTNESS OF BREATH: 0
TROUBLE SWALLOWING: 0
ABDOMINAL PAIN: 0
BACK PAIN: 0

## 2020-01-20 NOTE — ED PROVIDER NOTES
[address] [facility] ED  eMERGENCY dEPARTMENT eNCOUnter      Pt Name: Alma Aguirre  MRN: [id number]  [place] 1995  Date of evaluation: 1/20/2020  Provider: COLLINS Daniels CNP      HISTORY OF PRESENT ILLNESS    Alma Aguirre is a 25 y.o. female who presents to the Emergency Department with cough with intermittent wheezing, runny nose, sore throat x 3 days. Patient has been eating and drinking without difficulty. Denies abdominal pain, nausea or vomiting. No pain. REVIEW OF SYSTEMS       Review of Systems   Constitutional: Negative for activity change, appetite change and fever. HENT: Positive for sinus pain and sore throat. Negative for congestion, drooling, ear pain and trouble swallowing. Respiratory: Positive for cough. Negative for shortness of breath. Cardiovascular: Negative for chest pain. Gastrointestinal: Negative for abdominal pain, diarrhea, nausea and vomiting. Genitourinary: Negative for dysuria. Musculoskeletal: Negative for arthralgias and back pain. Skin: Negative for rash. Neurological: Negative for dizziness, light-headedness and headaches. All other systems reviewed and are negative. PAST MEDICAL HISTORY     Past Medical History:   Diagnosis Date    Asthma     Seizures ([facility] [address]          SURGICAL HISTORY     History reviewed. No pertinent surgical history. CURRENT MEDICATIONS       Previous Medications    LURASIDONE (LATUDA) 20 MG TABS TABLET    Take 2 tablets by mouth daily    NAPROXEN (NAPROSYN) 500 MG TABLET    Take 1 tablet by mouth 2 times daily as needed for Pain    ONDANSETRON (ZOFRAN) 4 MG TABLET    Take 1 tablet by mouth 3 times daily as needed for Nausea or Vomiting    TERAZOSIN (HYTRIN) 1 MG CAPSULE    Take 1 capsule by mouth nightly       ALLERGIES     Patient has no known allergies.     FAMILY HISTORY       Family History   Problem Relation Age of Onset    Substance Abuse Father     Kidney Disease Father     Liver Disease Father           SOCIAL HISTORY       Social History     Socioeconomic History    Marital status: Single     Spouse name: None    Number of children: None    Years of education: None    Highest education level: None   Occupational History    None   Social Needs    Financial resource strain: None    Food insecurity:     Worry: None     Inability: None    Transportation needs:     Medical: None     Non-medical: None   Tobacco Use    Smoking status: Current Every Day Smoker     Packs/day: 2.00     Years: 7.00     Pack years: 14.00     Types: Cigarettes    Smokeless tobacco: Never Used   Substance and Sexual Activity    Alcohol use: Yes     Comment: socially    Drug use: Yes     Types: Marijuana    Sexual activity: Yes     Partners: Male   Lifestyle    Physical activity:     Days per week: None     Minutes per session: None    Stress: None   Relationships    Social connections:     Talks on phone: None     Gets together: None     Attends Baptism service: None     Active member of club or organization: None     Attends meetings of clubs or organizations: None     Relationship status: None    Intimate partner violence:     Fear of current or ex partner: None     Emotionally abused: None     Physically abused: None     Forced sexual activity: None   Other Topics Concern    None   Social History Narrative    None       SCREENINGS      @FLOW([id number])@      PHYSICAL EXAM    (up to 7 for level 4, 8 or more for level 5)     ED Triage Vitals [01/20/20 1047]   BP Temp Temp Source Pulse Resp SpO2 Height Weight   112/74 98 °F (36.7 °C) Temporal 100 16 96 % 5' 2\" (1.575 m) 135 lb (61.2 kg)       Physical Exam  Vitals signs and nursing note reviewed. Constitutional:       Appearance: She is well-developed. HENT:      Head: Normocephalic and atraumatic.       Right Ear: Hearing, tympanic membrane, ear canal and external ear normal.      Left Ear: Hearing, tympanic membrane, ear canal and external ear

## 2020-01-21 ENCOUNTER — HOSPITAL ENCOUNTER (OUTPATIENT)
Dept: PHYSICAL THERAPY | Age: 25
Setting detail: THERAPIES SERIES
Discharge: HOME | End: 2020-01-21
Payer: MEDICARE

## 2020-01-21 PROCEDURE — 97110 THERAPEUTIC EXERCISES: CPT

## 2020-01-21 PROCEDURE — 97035 APP MDLTY 1+ULTRASOUND EA 15: CPT

## 2020-01-21 ASSESSMENT — PAIN DESCRIPTION - ORIENTATION: ORIENTATION: LOWER;LEFT

## 2020-01-21 ASSESSMENT — PAIN DESCRIPTION - DESCRIPTORS: DESCRIPTORS: TIGHTNESS;SHARP

## 2020-01-21 ASSESSMENT — PAIN DESCRIPTION - LOCATION: LOCATION: NECK;BACK

## 2020-01-21 ASSESSMENT — PAIN SCALES - GENERAL: PAINLEVEL_OUTOF10: 10

## 2020-01-30 ENCOUNTER — HOSPITAL ENCOUNTER (OUTPATIENT)
Dept: PHYSICAL THERAPY | Age: 25
Setting detail: THERAPIES SERIES
Discharge: HOME | End: 2020-01-30
Payer: MEDICARE

## 2020-01-30 PROCEDURE — 97035 APP MDLTY 1+ULTRASOUND EA 15: CPT

## 2020-01-30 PROCEDURE — 97140 MANUAL THERAPY 1/> REGIONS: CPT

## 2020-01-30 PROCEDURE — 97110 THERAPEUTIC EXERCISES: CPT

## 2020-01-30 ASSESSMENT — PAIN DESCRIPTION - LOCATION: LOCATION: BACK

## 2020-01-30 ASSESSMENT — PAIN DESCRIPTION - ORIENTATION: ORIENTATION: LEFT;RIGHT

## 2020-01-30 ASSESSMENT — PAIN SCALES - GENERAL: PAINLEVEL_OUTOF10: 8

## 2020-01-30 ASSESSMENT — PAIN DESCRIPTION - DESCRIPTORS: DESCRIPTORS: ACHING

## 2020-01-30 ASSESSMENT — PAIN DESCRIPTION - PAIN TYPE: TYPE: ACUTE PAIN

## 2020-01-30 NOTE — PROGRESS NOTES
[address] [address]  Outpatient Physical Therapy    Treatment Note        Date: 2020  Patient: Benja Nino  : 1995  ACCT #: [de-identified]  Referring Practitioner: Maye Riedel La-Cruz  Diagnosis: Thoracic myofascial strain, strain of neck mm     Visit Information:  PT Visit Information  PT Insurance Information: Generic [employer] Insurance   Total # of Visits to Date: 9  No Show: 5  Canceled Appointment: 1  Progress Note Counter: -     Subjective: Pt reports being sick lately, increased cervical pain today although able to move Rt shoulder more. Comments: Pt arrived 14min late for tx. HEP Compliance:  [] Good [x] Fair [] Poor [] Reports not doing due to:    Vital Signs  Patient Currently in Pain: Yes   Pain Screening  Patient Currently in Pain: Yes  Pain Assessment  Pain Level: 8  Pain Type: Acute pain  Pain Location: Back  Pain Orientation: Left;Right  Pain Descriptors: Aching    OBJECTIVE:   Exercises  Exercise 2: gentle cervical arom 5s x5  Exercise 3: scap retract 5\"x15   Exercise 4: levator/UT stretch 3x30\"  Exercise 19: KT to Rt UT for inhibition       ROM: [] NT  [x] ROM measurements:      AROM RUE (degrees)  RUE General AROM: Shoulder flex 148deg abd 90 deg        Spine  Cervical: Flex 18, ext 25, Rt rotation 26, Lt rot 26  Manual:   Manual therapy  Soft Tissue Mobalization: Upper thoracic/Rt cervical parapsinals to decrease pain and tightness x8min    Modalities:  Modalities  Ultrasound: R cervical paraspinals/UT 1. 0MHz, 1.0 W/cm2, 50%, 8 min to decrease mm tension for improved tolerance to therex     *Indicates exercise, modality, or manual techniques to be initiated when appropriate    Assessment: Body structures, Functions, Activity limitations: Decreased functional mobility , Decreased ADL status, Decreased ROM, Decreased strength, Increased pain, Decreased posture  Assessment: Pt with decrease in cervical rotation today due to increased pain/nausea.  Improved Rt

## 2020-02-14 ENCOUNTER — OFFICE VISIT (OUTPATIENT)
Dept: FAMILY MEDICINE CLINIC | Age: 25
End: 2020-02-14
Payer: MEDICARE

## 2020-02-14 VITALS
OXYGEN SATURATION: 98 % | RESPIRATION RATE: 18 BRPM | TEMPERATURE: 97.3 F | BODY MASS INDEX: 23.9 KG/M2 | HEIGHT: 62 IN | WEIGHT: 129.9 LBS | HEART RATE: 77 BPM | DIASTOLIC BLOOD PRESSURE: 68 MMHG | SYSTOLIC BLOOD PRESSURE: 114 MMHG

## 2020-02-14 PROCEDURE — G8482 FLU IMMUNIZE ORDER/ADMIN: HCPCS | Performed by: FAMILY MEDICINE

## 2020-02-14 PROCEDURE — 99214 OFFICE O/P EST MOD 30 MIN: CPT | Performed by: FAMILY MEDICINE

## 2020-02-14 PROCEDURE — G8427 DOCREV CUR MEDS BY ELIG CLIN: HCPCS | Performed by: FAMILY MEDICINE

## 2020-02-14 PROCEDURE — 4004F PT TOBACCO SCREEN RCVD TLK: CPT | Performed by: FAMILY MEDICINE

## 2020-02-14 PROCEDURE — G8420 CALC BMI NORM PARAMETERS: HCPCS | Performed by: FAMILY MEDICINE

## 2020-02-14 RX ORDER — PRAZOSIN HYDROCHLORIDE 1 MG/1
1 CAPSULE ORAL NIGHTLY
Qty: 30 CAPSULE | Refills: 5 | Status: SHIPPED | OUTPATIENT
Start: 2020-02-14 | End: 2020-02-28 | Stop reason: SINTOL

## 2020-02-14 RX ORDER — IBUPROFEN 800 MG/1
TABLET ORAL
COMMUNITY
Start: 2018-10-19 | End: 2020-07-07
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
## 2020-02-19 ENCOUNTER — TELEPHONE (OUTPATIENT)
Dept: FAMILY MEDICINE CLINIC | Age: 25
End: 2020-02-19

## 2020-02-28 ENCOUNTER — OFFICE VISIT (OUTPATIENT)
Dept: FAMILY MEDICINE CLINIC | Age: 25
End: 2020-02-28
Payer: MEDICARE

## 2020-02-28 VITALS
DIASTOLIC BLOOD PRESSURE: 62 MMHG | WEIGHT: 130 LBS | SYSTOLIC BLOOD PRESSURE: 102 MMHG | RESPIRATION RATE: 14 BRPM | TEMPERATURE: 97.9 F | HEART RATE: 71 BPM | HEIGHT: 62 IN | OXYGEN SATURATION: 98 % | BODY MASS INDEX: 23.92 KG/M2

## 2020-02-28 PROCEDURE — G8427 DOCREV CUR MEDS BY ELIG CLIN: HCPCS | Performed by: FAMILY MEDICINE

## 2020-02-28 PROCEDURE — G8482 FLU IMMUNIZE ORDER/ADMIN: HCPCS | Performed by: FAMILY MEDICINE

## 2020-02-28 PROCEDURE — G8420 CALC BMI NORM PARAMETERS: HCPCS | Performed by: FAMILY MEDICINE

## 2020-02-28 PROCEDURE — 99214 OFFICE O/P EST MOD 30 MIN: CPT | Performed by: FAMILY MEDICINE

## 2020-02-28 PROCEDURE — 4004F PT TOBACCO SCREEN RCVD TLK: CPT | Performed by: FAMILY MEDICINE

## 2020-02-28 RX ORDER — ALBUTEROL SULFATE 90 UG/1
AEROSOL, METERED RESPIRATORY (INHALATION)
Qty: 1 INHALER | Refills: 4 | Status: SHIPPED | OUTPATIENT
Start: 2020-02-28 | End: 2020-07-07 | Stop reason: SDUPTHER

## 2020-02-28 RX ORDER — QUETIAPINE FUMARATE 50 MG/1
50 TABLET, EXTENDED RELEASE ORAL NIGHTLY
Qty: 30 TABLET | Refills: 1 | Status: SHIPPED | OUTPATIENT
Start: 2020-02-28 | End: 2020-07-07

## 2020-02-28 RX ORDER — NAPROXEN 500 MG/1
500 TABLET ORAL 2 TIMES DAILY PRN
Qty: 60 TABLET | Refills: 1 | Status: SHIPPED | OUTPATIENT
Start: 2020-02-28 | End: 2020-07-07

## 2020-02-28 RX ORDER — CYCLOBENZAPRINE HCL 10 MG
10 TABLET ORAL 3 TIMES DAILY PRN
Qty: 30 TABLET | Refills: 0 | Status: SHIPPED | OUTPATIENT
Start: 2020-02-28 | End: 2020-03-09

## 2020-02-28 NOTE — PROGRESS NOTES
Subjective  Coco Cano, 25 y.o. female presents today with:  Chief Complaint   Patient presents with    Mood Swings     2 week follow up for mood disorders.  Discuss Medications     Anisha Hanson is not approved by her insurance. Sleeping medication makes her dizzy           HPI    [facility] is affected by bipolar disorder with moderate to severe depression. She finds her life to be chaotic and unpredictable. She is in a place of employment that is undesirable to her. She has had difficulty with adhering to medical regimen and medical advice. Initially she lost her prescription for lurasidone. After it was replaced, she found that it was not approved by her insurance but she did not call to request different medication. She also states that the prazosin which was prescribed for PTSD and nightmares causes her dizziness. She takes it between 9 and 10pm and gets out of bed around 11AM. Today she states her depression is gone. No sadness, no tearfulness. Has hope for future. No hallucinations or thoughts of suicide. Has stopped smoking marijuana. Does not drink alcohol. States she does not use other recreational drugs. Acknowledges that she has bipolar and that she has a bumpy road with her depression. Plans to call Dr. Faraz Dick  to make appt. Wants to be a . Plans to go to trade school with [employer]. She will get diploma and  training simultaneously. No other questions and or concerns for today's visit      Review of Systems  Still with neck, shoulder, thoracic, arm pain and spasms. Past Medical History:   Diagnosis Date    Asthma     Seizures ([facility] [address]      History reviewed. No pertinent surgical history.   Social History     Socioeconomic History    Marital status: Single     Spouse name: Not on file    Number of children: Not on file    Years of education: Not on file    Highest education level: Not on file   Occupational History    Not on file   Social Needs    Strain of neck muscle, subsequent encounter        cyclobenzaprine (FLEXERIL) 10 MG tablet [2017]           Thoracic myofascial strain, initial encounter        naproxen (NAPROSYN) 500 MG tablet [5393]           Strain of neck muscle, initial encounter        cyclobenzaprine (FLEXERIL) 10 MG tablet [2017]      naproxen (NAPROSYN) 500 MG tablet [5393]           ORDERS WITHOUT AN ASSOCIATED DIAGNOSIS    albuterol sulfate HFA (PROAIR HFA) 108 (90 Base) MCG/ACT inhaler [62954]            Lengthy discussion regarding typical waxing and waning features of bipolar affective disorder and need for mood stabilization on a long-term basis. Increase to Seroquel XL while we await her appointment with Dr. Rodrigo Hester. She commits to calling Dr. Rodrigo Hester  to make an appointment. She also commits to scheduling onsite interview at [facility] for [employer] training. Reviewed with the patient: all disease processes, current clinical status, medications, activities and diet.      Side effects, adverse effects of the medication prescribed today, as well as treatment plan/ rationale and result expectations have been discussed with the patient who expresses understanding and desires to proceed.     Close follow up to evaluate treatment results and for coordination of care. I have reviewed the patient's medical history in detail and updated the computerized patient record. More than 50% of the 30 minute appointment was spent in face-to-face counseling, education and care coordination. Please note this report has been partially produced using speech recognition software and may contain mistakes related to that system including errors in grammar, punctuation and spelling as well as words and phrases that may seem inappropriate. If there are questions or concerns, please feel free to contact me to clarify. No orders of the defined types were placed in this encounter.     Orders Placed This Encounter   Medications   

## 2020-03-02 ENCOUNTER — TELEPHONE (OUTPATIENT)
Dept: FAMILY MEDICINE CLINIC | Age: 25
End: 2020-03-02

## 2020-03-02 NOTE — TELEPHONE ENCOUNTER
paperwork will be completed during first session    Informed patient: please arrive early to your appointment, there will be some paperwork for you to complete prior to your appointment. An appointment should last an hour but please be prepared to wait for up to 1/2-hour and schedule ample time to return to work after your appointment. Please bring any relevant paperwork from your previous psychiatric appointments/assessments with you to your first appointment. You may also fax this paperwork to [employer] at [phone].

## 2020-07-07 ENCOUNTER — OFFICE VISIT (OUTPATIENT)
Dept: FAMILY MEDICINE CLINIC | Age: 25
End: 2020-07-07
Payer: MEDICARE

## 2020-07-07 VITALS
RESPIRATION RATE: 18 BRPM | HEIGHT: 62 IN | OXYGEN SATURATION: 98 % | SYSTOLIC BLOOD PRESSURE: 122 MMHG | WEIGHT: 125.3 LBS | DIASTOLIC BLOOD PRESSURE: 66 MMHG | BODY MASS INDEX: 23.06 KG/M2 | TEMPERATURE: 96.9 F | HEART RATE: 61 BPM

## 2020-07-07 PROBLEM — S16.1XXA CERVICAL STRAIN: Status: RESOLVED | Noted: 2019-12-30 | Resolved: 2020-07-07

## 2020-07-07 PROBLEM — K21.9 GASTROESOPHAGEAL REFLUX DISEASE WITHOUT ESOPHAGITIS: Status: ACTIVE | Noted: 2020-07-07

## 2020-07-07 PROBLEM — K29.00 ACUTE SUPERFICIAL GASTRITIS WITHOUT HEMORRHAGE: Chronic | Status: ACTIVE | Noted: 2020-07-07

## 2020-07-07 PROBLEM — J45.20 MILD INTERMITTENT ASTHMA WITHOUT COMPLICATION: Chronic | Status: ACTIVE | Noted: 2020-07-07

## 2020-07-07 PROBLEM — M62.838 MUSCLE SPASM: Chronic | Status: ACTIVE | Noted: 2020-07-07

## 2020-07-07 PROBLEM — S29.019A THORACIC MYOFASCIAL STRAIN: Status: RESOLVED | Noted: 2019-12-30 | Resolved: 2020-07-07

## 2020-07-07 PROBLEM — M54.2 CERVICALGIA: Chronic | Status: ACTIVE | Noted: 2020-07-07

## 2020-07-07 PROBLEM — N92.0 MENORRHAGIA WITH REGULAR CYCLE: Chronic | Status: ACTIVE | Noted: 2020-07-07

## 2020-07-07 PROCEDURE — G8420 CALC BMI NORM PARAMETERS: HCPCS | Performed by: FAMILY MEDICINE

## 2020-07-07 PROCEDURE — G8427 DOCREV CUR MEDS BY ELIG CLIN: HCPCS | Performed by: FAMILY MEDICINE

## 2020-07-07 PROCEDURE — 4004F PT TOBACCO SCREEN RCVD TLK: CPT | Performed by: FAMILY MEDICINE

## 2020-07-07 PROCEDURE — 99215 OFFICE O/P EST HI 40 MIN: CPT | Performed by: FAMILY MEDICINE

## 2020-07-07 RX ORDER — FAMOTIDINE 20 MG/1
20 TABLET, FILM COATED ORAL 2 TIMES DAILY
Qty: 60 TABLET | Refills: 3 | Status: SHIPPED | OUTPATIENT
Start: 2020-07-07

## 2020-07-07 RX ORDER — ALBUTEROL SULFATE 90 UG/1
AEROSOL, METERED RESPIRATORY (INHALATION)
Qty: 1 INHALER | Refills: 1 | Status: SHIPPED | OUTPATIENT
Start: 2020-07-07

## 2020-07-07 RX ORDER — QUETIAPINE FUMARATE 25 MG/1
25 TABLET, FILM COATED ORAL 2 TIMES DAILY
Qty: 60 TABLET | Refills: 3 | Status: SHIPPED | OUTPATIENT
Start: 2020-07-07

## 2020-07-07 RX ORDER — CYCLOBENZAPRINE HCL 10 MG
10 TABLET ORAL 3 TIMES DAILY PRN
Qty: 30 TABLET | Refills: 1 | Status: SHIPPED | OUTPATIENT
Start: 2020-07-07 | End: 2020-07-17

## 2020-09-23 ENCOUNTER — NURSE TRIAGE (OUTPATIENT)
Dept: OTHER | Facility: CLINIC | Age: 25
End: 2020-09-23

## 2020-09-23 NOTE — TELEPHONE ENCOUNTER
Patient called [facility]) to schedule appointment with red flag complaint; transferred to Nurse Access for triage by Catalino Mccabe (agent's name). Reason for Disposition   All other patients with chest pain (Exception: fleeting chest pain lasting a few seconds)    Answer Assessment - Initial Assessment Questions  1. LOCATION: \"Where does it hurt? \"        middle  2. RADIATION: \"Does the pain go anywhere else? \" (e.g., into neck, jaw, arms, back)      Down into stomach  3. ONSET: \"When did the chest pain begin? \" (Minutes, hours or days)       2 days  4. PATTERN \"Does the pain come and go, or has it been constant since it started? \"  \"Does it get worse with exertion? \"       intermittent  5. DURATION: \"How long does it last\" (e.g., seconds, minutes, hours)      A few seconds  6. SEVERITY: \"How bad is the pain? \"  (e.g., Scale 1-10; mild, moderate, or severe)     - MILD (1-3): doesn't interfere with normal activities      - MODERATE (4-7): interferes with normal activities or awakens from sleep     - SEVERE (8-10): excruciating pain, unable to do any normal activities        8/10  7. CARDIAC RISK FACTORS: \"Do you have any history of heart problems or risk factors for heart disease? \" (e.g., angina, prior heart attack; diabetes, high blood pressure, high cholesterol, smoker, or strong family history of heart disease)      No  8. PULMONARY RISK FACTORS: \"Do you have any history of lung disease? \"  (e.g., blood clots in lung, asthma, emphysema, birth control pills)      asthma  9. CAUSE: \"What do you think is causing the chest pain? \"      Unsure  10. OTHER SYMPTOMS: \"Do you have any other symptoms? \" (e.g., dizziness, nausea, vomiting, sweating, fever, difficulty breathing, cough)        Dizziness if gets up too fast  11. PREGNANCY: \"Is there any chance you are pregnant? \" \"When was your last menstrual period? \"        No    Protocols used: CHEST PAIN-ADULT-[place]    Informed of disposition. Care advice as documented. Instructed to call back with worsening symptoms. Soft transfer to [facility]) to schedule appointment as recommended. Please do not respond to the triage nurse through this encounter. Any subsequent communication should be directly with the patient.

## 2021-03-02 ENCOUNTER — APPOINTMENT (OUTPATIENT)
Dept: GENERAL RADIOLOGY | Age: 26
End: 2021-03-02
Payer: MEDICARE

## 2021-03-02 ENCOUNTER — HOSPITAL ENCOUNTER (EMERGENCY)
Age: 26
Discharge: HOME | End: 2021-03-02
Attending: EMERGENCY MEDICINE
Payer: MEDICARE

## 2021-03-02 VITALS
HEART RATE: 95 BPM | SYSTOLIC BLOOD PRESSURE: 119 MMHG | RESPIRATION RATE: 16 BRPM | BODY MASS INDEX: 21.51 KG/M2 | HEIGHT: 64 IN | TEMPERATURE: 98 F | OXYGEN SATURATION: 98 % | DIASTOLIC BLOOD PRESSURE: 68 MMHG

## 2021-03-02 DIAGNOSIS — S93.601A SPRAIN OF RIGHT FOOT, INITIAL ENCOUNTER: Primary | ICD-10-CM

## 2021-03-02 PROCEDURE — 73630 X-RAY EXAM OF FOOT: CPT

## 2021-03-02 PROCEDURE — 99283 EMERGENCY DEPT VISIT LOW MDM: CPT

## 2021-03-02 ASSESSMENT — PAIN DESCRIPTION - DESCRIPTORS: DESCRIPTORS: PRESSURE;THROBBING

## 2021-03-02 ASSESSMENT — PAIN SCALES - GENERAL: PAINLEVEL_OUTOF10: 6

## 2021-03-02 ASSESSMENT — PAIN DESCRIPTION - ONSET
ONSET: ON-GOING
ONSET: ON-GOING

## 2021-03-02 NOTE — ED PROVIDER NOTES
CC/HPI: 77-year-old female to the emergency department chief complaint of right foot pain. Patient states that while at work for 5 days ago she twisted her ankle. Patient states initially it did not hurt however the following day she had some soreness and discomfort to the lateral aspect of her ankle which is now gone. Patient states over the last 3 to 4 days she has had increased discomfort in the lateral aspect of her right foot. Patient denies numbness or tingling no previous injury no other injuries no knee pain. VITALS/PMH/PSH: Reviewed per nurses notes    REVIEW OF SYSTEMS: As in chief complaint history of present illness, otherwise all other systems are reviewed and negative the total 10 systems reviewed    GEN: Pt alert and oriented, no acute distress  HEENT:         Normocephalic/Atramatic        PERRL, EOMI  NECK: Nontender, no signs of trauma, no lymphadenopathy  HEART: Reg S1/S2, without murmer, rub or gallop  LUNGS: Clear to auscultation bilaterally, respirations even and unlabored  MUSCULOSKELETAL/EXTREMITITES:  No outward signs of trauma, cyanosis or edema. No tenderness with palpation about the knee proximal fibula ankle or Achilles. No bruising or swelling noted. Patient neurovascularly intact distally. Patient tender about the fourth and fifth metatarsal bones right foot. No calf tenderness or swelling  LYMPH: no peripheral lympadenopathy noted  SKIN:  Warm & dry, no rash  NEUROLOGIC:  Alert and oriented. Speech clear    Medical decision making/ED course;  Patient remained stable throughout the course of her emergency department stay  X-rays were obtained of her right foot and interpreted by me as being negative  Clinical impression;  1) acute right foot sprain  Disposition/plan;  Patient being discharged home in stable condition given discharge instructions on foot sprain. She is to follow-up with orthopedics and 3 to 5 days unless symptoms are improving.   Patient to return for any worsening or changes in symptoms.        Sara Sethi DO  03/02/21 1127

## 2021-03-02 NOTE — ED TRIAGE NOTES
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 Pt now having pain with walking and pressure when bearing weight. Pt able to ambulate but has slight limp with right foot. Pt plan of care explained to Pt during bedside exam by Dr. Lindy Kamara.

## 2021-03-03 ENCOUNTER — CARE COORDINATION (OUTPATIENT)
Dept: CARE COORDINATION | Age: 26
End: 2021-03-03

## 2021-03-03 NOTE — CARE COORDINATION
Patient contacted regarding recent discharge and COVID-19 risk. Discussed COVID-19 related testing which was not done at this time. Test results were not done. Care Transition Nurse/ Ambulatory Care Manager contacted the patient by telephone to perform post discharge assessment. Verified name and  with patient as identifiers. Patient has following risk factors of: asthma. CTN/ACM reviewed discharge instructions, medical action plan and red flags related to discharge diagnosis. Reviewed and educated them on any new and changed medications related to discharge diagnosis. Advised obtaining a 90-day supply of all daily and as-needed medications. Education provided regarding infection prevention, and signs and symptoms of COVID-19 and when to seek medical attention with patient who verbalized understanding. Discussed exposure protocols and quarantine from [address] you at higher risk for severe illness  and given an opportunity for questions and concerns. The patient agrees to contact the COVID-19 hotline [phone] or PCP office for questions related to their healthcare. CTN/ACM provided contact information for future reference. From CDC: Are you at higher risk for severe illness?  Wash your hands often.  Avoid close contact (6 feet, which is about two arm lengths) with people who are sick.  Put distance between yourself and other people if COVID-19 is spreading in your community.  Clean and disinfect frequently touched surfaces.  Avoid all cruise travel and non-essential air travel.  Call your healthcare professional if you have concerns about COVID-19 and your underlying condition or if you are sick. For more information on steps you can take to protect yourself, see CDC's How to [address] [place] [address] for follow-up call in 7-14 days based on severity of symptoms and risk factors.     I spoke with Rebecca Herrera who states right ankle/foot is still hurting a lot and she can not hardly walk on it, Requested I assist with scheduling follow up with Dr. Sophia Rosa and Dr Darrick Thomas, no covid testing done when she was in ER, no covid related symptomsI advised of risks when going to Er and reviewed CDC recommendations and symptoms to monitor. No evident criteria for care coordination, will follow as needed.

## 2021-03-09 ENCOUNTER — OFFICE VISIT (OUTPATIENT)
Dept: ORTHOPEDIC SURGERY | Age: 26
End: 2021-03-09
Payer: MEDICARE

## 2021-03-09 DIAGNOSIS — S90.121A CONTUSION OF FIFTH TOE OF RIGHT FOOT, INITIAL ENCOUNTER: Primary | ICD-10-CM

## 2021-03-09 PROCEDURE — G8420 CALC BMI NORM PARAMETERS: HCPCS | Performed by: ORTHOPAEDIC SURGERY

## 2021-03-09 PROCEDURE — 4004F PT TOBACCO SCREEN RCVD TLK: CPT | Performed by: ORTHOPAEDIC SURGERY

## 2021-03-09 PROCEDURE — 99204 OFFICE O/P NEW MOD 45 MIN: CPT | Performed by: ORTHOPAEDIC SURGERY

## 2021-03-09 PROCEDURE — L4360 PNEUMAT WALKING BOOT PRE CST: HCPCS | Performed by: ORTHOPAEDIC SURGERY

## 2021-03-09 PROCEDURE — G8427 DOCREV CUR MEDS BY ELIG CLIN: HCPCS | Performed by: ORTHOPAEDIC SURGERY

## 2021-03-09 PROCEDURE — G8484 FLU IMMUNIZE NO ADMIN: HCPCS | Performed by: ORTHOPAEDIC SURGERY

## 2021-03-09 NOTE — PATIENT INSTRUCTIONS
Diagnosis:  -Deep contusion of right foot, probable tendinitis of right foot, possible occult fracture    Recommendations:  -You have been provided with an orthotic boot. Recommend wearing this at all times when standing or walking. May remove for sleep, hygiene purposes, and as needed for icing and driving.  -Keep right foot elevated above the heart when seated or lying down with 2-3 pillows. -May apply ice packs to right foot (boot removed or open) for 15-20 minutes every 4 hours while awake. -May place full weight on right foot with orthotic boot in place. No running or jumping.  -A prescription for naproxen 500 mg tablets has been sent to your pharmacy. Please take 1 tablet by mouth with food twice daily on a regular schedule until your follow-up appointment. -May take acetaminophen (Tylenol) 650 mg by mouth every 6 hours as needed to control pain.    -Follow-up with Dr. Tristen Merchant for right foot in 2 weeks.

## 2021-03-09 NOTE — PROGRESS NOTES
Subjective:      Patient ID: Jason Gill is a 22 y.o. female who presents today for:  Chief Complaint   Patient presents with    Foot Injury     right foot injury. The pain is on the outside of her foot. She states that she rolled her foot 1 1/2 weeks ago. Was seen in ED and they told her to stay off of it. She is no better since then. Pain is an 8/10     HPI:    The patient is a pleasant 20-year-old female who presents for evaluation of right lateral foot pain with onset approximately 10 days ago. 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 Initially had ankle pain but that resolved and she has had persistent pain along the lateral border of the right forefoot and midfoot. Denies numbness or tingling. Remains ambulatory albeit with discomfort. Has not been immobilized in any fashion. Rates pain 4/10, described as sharp, aggravated by weightbearing. Past Medical History:   Diagnosis Date    Acute superficial gastritis without hemorrhage 7/7/2020    Asthma     Cervicalgia 7/7/2020    [facility] to [facility] 11/11/2019    Menorrhagia with regular cycle 7/7/2020    Muscle spasm 7/7/2020    Seizures (HCC)      No past surgical history on file.   Social History     Socioeconomic History    Marital status: Single     Spouse name: Not on file    Number of children: Not on file    Years of education: Not on file    Highest education level: Not on file   Occupational History    Not on file   Social Needs    Financial resource strain: Not on file    Food insecurity     Worry: Not on file     Inability: Not on file    Transportation needs     Medical: Not on file     Non-medical: Not on file   Tobacco Use    Smoking status: Current Every Day Smoker     Packs/day: 1.00     Years: 7.00     Pack years: 7.00     Types: Cigarettes    Smokeless tobacco: Never Used   Substance and Sexual Activity    Alcohol use: Yes     Comment: socially    Drug use: Yes     Types: Marijuana    Sexual activity: Yes     Partners: Male   Lifestyle    Physical activity     Days per week: Not on file     Minutes per session: Not on file    Stress: Not on file   Relationships    Social connections     Talks on phone: Not on file     Gets together: Not on file     Attends Sabianist service: Not on file     Active member of club or organization: Not on file     Attends meetings of clubs or organizations: Not on file     Relationship status: Not on file    Intimate partner violence     Fear of current or ex partner: Not on file     Emotionally abused: Not on file     Physically abused: Not on file     Forced sexual activity: Not on file   Other Topics Concern    Not on file   Social History Narrative    Not on file     Family History   Problem Relation Age of Onset    Substance Abuse Father     Kidney Disease Father     Liver Disease Father      No Known Allergies  Current Outpatient Medications on File Prior to Visit   Medication Sig Dispense Refill    albuterol sulfate HFA (PROAIR HFA) 108 (90 Base) MCG/ACT inhaler 1-2 puffs, Use every 4 hours while awake for PRN wheezing  Dispense with SPACER and Instruct on use. May sub Ventolin or Proventil as needed per [employer]. (Patient not taking: Reported on 3/9/2021) 1 Inhaler 1    famotidine (PEPCID) 20 MG tablet Take 1 tablet by mouth 2 times daily (Patient not taking: Reported on 3/9/2021) 60 tablet 3    QUEtiapine (SEROQUEL) 25 MG tablet Take 1 tablet by mouth 2 times daily (Patient not taking: Reported on 3/9/2021) 60 tablet 3     No current facility-administered medications on file prior to visit. Acute and Chronic Pain Monitoring:   RX Monitoring 3/18/2018   Attestation The Prescription Monitoring Report for this patient was reviewed today. Periodic Controlled Substance Monitoring No signs of potential drug abuse or diversion identified.          Objective--Physical Examination:     LMP 02/14/2021     Physical Examination:  Pleasant 25-year-old female in mild distress, alert and cooperative. Examination of the right lower extremity reveals painless hip and knee range of motion. Nontender over the fibular head neck. Compartments of the leg are supple. She is able to circumduct the right ankle. Negative syndesmotic squeeze test.  No pain with palpation over the medial or lateral malleolus, deltoid or calcaneofibular ligaments. Focused examination the right foot reveals that she is nontender with hindfoot compression and forefoot compression at Lisfranc's joint. She has generalized tenderness along the lateral border of the right midfoot and forefoot and is able to wiggle toes albeit with subjective weakness. Plantarflexion, dorsiflexion, inversion, eversion strength are 4+/5 limited by discomfort emanating from the foot. Subtle contusion over the lateral border the foot. No plantar ecchymosis. DP and popliteal pulses are 2+ with capillary refill less than 2 seconds. Radiographs and Laboratory Studies:     Diagnostic Imaging Studies:    Previously obtained AP, lateral, oblique radiographs of the right foot were independently reviewed through the [facility] system. These images were obtained on 3/2/2021. Demonstrate no evidence of obvious lucency along the fifth ray including the metacarpal and phalanges. No dislocation associated with fifth ray. Lisfranc's articulation is maintained. No fine foot pathology. -    Laboratory Studies:   No results found for: WBC, HGB, HCT, MCV, PLT  No results found for: SEDRATE  No results found for: CRP    Assessment / Plan:      Diagnosis Orders   1. Contusion of fifth toe of right foot, initial encounter  Pneuma/vac walk boot pre ots      Orders Placed This Encounter   Procedures    Pneuma/vac walk boot pre ots     Patient was prescribed a Breg Tall ufindads Walking Boot. The right footweight bearing as tolerated in the device.  and ankle will require stabilization / assessment. Procedures Performed Today:     Application of long orthotic boot to right lower extremity    Follow-up (with Radiographs) / Referral:     The patient will follow up in 2 weeks for repeat clinical assessment pertaining to right lateral foot injury. Radiographs do not need to be updated at the outset of her visit, although may be considered if she has persistent pain on her follow-up examination.     Memo Christianson MD  Orthopaedic Surgery

## 2021-07-23 ENCOUNTER — HOSPITAL ENCOUNTER (EMERGENCY)
Age: 26
Discharge: HOME | End: 2021-07-23
Payer: MEDICARE

## 2021-07-23 VITALS
OXYGEN SATURATION: 100 % | TEMPERATURE: 98.1 F | RESPIRATION RATE: 17 BRPM | HEART RATE: 64 BPM | WEIGHT: 130 LBS | BODY MASS INDEX: 24.55 KG/M2 | HEIGHT: 61 IN | DIASTOLIC BLOOD PRESSURE: 76 MMHG | SYSTOLIC BLOOD PRESSURE: 119 MMHG

## 2021-07-23 DIAGNOSIS — Z20.822 LAB TEST NEGATIVE FOR COVID-19 VIRUS: Primary | ICD-10-CM

## 2021-07-23 LAB — SARS-COV-2, NAAT: NOT DETECTED

## 2021-07-23 PROCEDURE — 99282 EMERGENCY DEPT VISIT SF MDM: CPT

## 2021-07-23 PROCEDURE — 87635 SARS-COV-2 COVID-19 AMP PRB: CPT

## 2021-07-23 ASSESSMENT — ENCOUNTER SYMPTOMS
TROUBLE SWALLOWING: 0
BACK PAIN: 0
DIARRHEA: 0
SORE THROAT: 0
SHORTNESS OF BREATH: 0
COUGH: 1
VOMITING: 0
ABDOMINAL PAIN: 0
NAUSEA: 0

## 2021-07-23 NOTE — ED PROVIDER NOTES
[facility] [facility] ED  eMERGENCY dEPARTMENT eNCOUnter      Pt Name: Jacky Soto  MRN: [id number]  [place] 1995  Date of evaluation: 7/23/2021  Provider: COLLINS Malik CNP      HISTORY OF PRESENT ILLNESS    Jacky Soto is a 22 y.o. female who presents to the Emergency Department with cough x 1 week. Boyfriend just tested positive for COVID and she would like testing. Patient denies, fever, chills, SOB, abdominal pain, N/V/D. No pain. Eating and drinking without difficulty. REVIEW OF SYSTEMS       Review of Systems   Constitutional: Negative for activity change, appetite change and fever. HENT: Negative for congestion, drooling, ear pain, sore throat and trouble swallowing. Respiratory: Positive for cough. Negative for shortness of breath. Cardiovascular: Negative for chest pain. Gastrointestinal: Negative for abdominal pain, diarrhea, nausea and vomiting. Genitourinary: Negative for dysuria. Musculoskeletal: Negative for arthralgias, back pain and neck pain. Skin: Negative for rash. Neurological: Negative for dizziness and headaches. All other systems reviewed and are negative. PAST MEDICAL HISTORY     Past Medical History:   Diagnosis Date    Acute superficial gastritis without hemorrhage 7/7/2020    Asthma     Cervicalgia 7/7/2020    Related to [facility] 11/11/2019    Menorrhagia with regular cycle 7/7/2020    Muscle spasm 7/7/2020    Seizures (HCC)          SURGICAL HISTORY     History reviewed. No pertinent surgical history. CURRENT MEDICATIONS       Previous Medications    ALBUTEROL SULFATE HFA (PROAIR HFA) 108 (90 BASE) MCG/ACT INHALER    1-2 puffs, Use every 4 hours while awake for PRN wheezing  Dispense with SPACER and Instruct on use. May sub Ventolin or Proventil as needed per [employer].     FAMOTIDINE (PEPCID) 20 MG TABLET    Take 1 tablet by mouth 2 times daily    QUETIAPINE (SEROQUEL) 25 MG TABLET    Take 1 tablet by mouth 2 times daily       ALLERGIES     Patient has no known allergies. FAMILY HISTORY       Family History   Problem Relation Age of Onset    Substance Abuse Father     Kidney Disease Father     Liver Disease Father           SOCIAL HISTORY       Social History     Socioeconomic History    Marital status: Single     Spouse name: None    Number of children: None    Years of education: None    Highest education level: None   Occupational History    None   Tobacco Use    Smoking status: Current Every Day Smoker     Packs/day: 1.00     Years: 7.00     Pack years: 7.00     Types: Cigarettes    Smokeless tobacco: Never Used   Vaping Use    Vaping Use: Some days    Substances: Flavoring    Devices: Disposable   Substance and Sexual Activity    Alcohol use: Yes     Comment: socially    Drug use: Yes     Types: Marijuana    Sexual activity: Yes     Partners: Male   Other Topics Concern    None   Social History Narrative    None     Social Determinants of Health     Financial Resource Strain:     Difficulty of Paying Living Expenses:    Food Insecurity:     Worried About Running Out of Food in the Last Year:     Ran Out of Food in the Last Year:    Transportation Needs:     Lack of Transportation (Medical):      Lack of Transportation (Non-Medical):    Physical Activity:     Days of Exercise per Week:     Minutes of Exercise per Session:    Stress:     Feeling of Stress :    Social Connections:     Frequency of Communication with Friends and Family:     Frequency of Social Gatherings with Friends and Family:     Attends Mosque Services:     Active Member of Clubs or Organizations:     Attends Club or Organization Meetings:     Marital Status:    Intimate Partner Violence:     Fear of Current or Ex-Partner:     Emotionally Abused:     Physically Abused:     Sexually Abused:        SCREENINGS      @FLOW([id number])@      PHYSICAL EXAM    (up to 7 for level 4, 8 or more for level 5)     ED Triage Vitals [07/23/21 1408]   BP Temp Temp Source Pulse Resp SpO2 Height Weight   119/76 98.1 °F (36.7 °C) Oral 64 17 100 % 5' 1\" (1.549 m) 130 lb (59 kg)       Physical Exam  Vitals and nursing note reviewed. Constitutional:       Appearance: She is well-developed. HENT:      Head: Normocephalic and atraumatic. Right Ear: Hearing, tympanic membrane, ear canal and external ear normal.      Left Ear: Hearing, tympanic membrane, ear canal and external ear normal.      Nose: Nose normal.      Mouth/Throat:      Lips: Pink. Mouth: Mucous membranes are moist.      Pharynx: Oropharynx is clear. Uvula midline. Eyes:      Conjunctiva/sclera: Conjunctivae normal.      Pupils: Pupils are equal, round, and reactive to light. Cardiovascular:      Rate and Rhythm: Normal rate and regular rhythm. Heart sounds: Normal heart sounds. Pulmonary:      Effort: Pulmonary effort is normal. No accessory muscle usage or respiratory distress. Breath sounds: Normal breath sounds. No decreased air movement. No decreased breath sounds, wheezing or rhonchi. Abdominal:      General: Bowel sounds are normal. There is no distension. Palpations: Abdomen is soft. Tenderness: There is no abdominal tenderness. Musculoskeletal:         General: Normal range of motion. Cervical back: Normal range of motion and neck supple. Skin:     General: Skin is warm and dry. Neurological:      General: No focal deficit present. Mental Status: She is alert and oriented to person, place, and time. GCS: GCS eye subscore is 4. GCS verbal subscore is 5. GCS motor subscore is 6. Deep Tendon Reflexes: Reflexes are normal and symmetric. Psychiatric:         Judgment: Judgment normal.           All other labs were within normal range or not returned as of this dictation.     EMERGENCY DEPARTMENT COURSE and DIFFERENTIALDIAGNOSIS/MDM:   Vitals:    Vitals:    07/23/21 1408   BP: 119/76   Pulse: 64   Resp: 17   Temp: 98.1 °F (36.7 °C)   TempSrc: Oral   SpO2: 100%   Weight: 130 lb (59 kg)   Height: 5' 1\" (1.549 m)            22 yr old female with negative COVID test.  Patient is comfortable in the ED. F/U With PCP as needed. Patient verbalizes understanding. PROCEDURES:  Unless otherwise noted below, none     Procedures      FINAL IMPRESSION      1.  Lab test negative for COVID-19 virus          DISPOSITION/PLAN   DISPOSITION Decision To Discharge 07/23/2021 02:51:07 PM          COLLINS Bah CNP (electronically signed)  Attending Emergency Physician     COLLINS Bah CNP  07/23/21 [id number]

## 2021-09-22 ENCOUNTER — HOSPITAL ENCOUNTER (EMERGENCY)
Age: 26
Discharge: HOME | End: 2021-09-22
Payer: MEDICARE

## 2021-09-22 VITALS
DIASTOLIC BLOOD PRESSURE: 76 MMHG | HEIGHT: 61 IN | WEIGHT: 130 LBS | BODY MASS INDEX: 24.55 KG/M2 | OXYGEN SATURATION: 99 % | RESPIRATION RATE: 19 BRPM | HEART RATE: 100 BPM | SYSTOLIC BLOOD PRESSURE: 107 MMHG | TEMPERATURE: 97.4 F

## 2021-09-22 DIAGNOSIS — L02.91 ABSCESS: Primary | ICD-10-CM

## 2021-09-22 PROCEDURE — 87185 SC STD ENZYME DETCJ PER NZM: CPT

## 2021-09-22 PROCEDURE — 87070 CULTURE OTHR SPECIMN AEROBIC: CPT

## 2021-09-22 PROCEDURE — 99283 EMERGENCY DEPT VISIT LOW MDM: CPT

## 2021-09-22 PROCEDURE — 6370000000 HC RX 637 (ALT 250 FOR IP): Performed by: PHYSICIAN ASSISTANT

## 2021-09-22 PROCEDURE — 10061 I&D ABSCESS COMP/MULTIPLE: CPT

## 2021-09-22 PROCEDURE — 87205 SMEAR GRAM STAIN: CPT

## 2021-09-22 PROCEDURE — 87075 CULTR BACTERIA EXCEPT BLOOD: CPT

## 2021-09-22 RX ORDER — CEPHALEXIN 500 MG/1
500 CAPSULE ORAL ONCE
Status: COMPLETED | OUTPATIENT
Start: 2021-09-22 | End: 2021-09-22

## 2021-09-22 RX ORDER — CEPHALEXIN 500 MG/1
500 CAPSULE ORAL 4 TIMES DAILY
Qty: 40 CAPSULE | Refills: 0 | Status: SHIPPED | OUTPATIENT
Start: 2021-09-22 | End: 2021-11-06 | Stop reason: ALTCHOICE

## 2021-09-22 RX ORDER — HYDROCODONE BITARTRATE AND ACETAMINOPHEN 5; 325 MG/1; MG/1
1 TABLET ORAL EVERY 6 HOURS PRN
Qty: 12 TABLET | Refills: 0 | Status: SHIPPED | OUTPATIENT
Start: 2021-09-22 | End: 2021-09-25

## 2021-09-22 RX ORDER — SULFAMETHOXAZOLE AND TRIMETHOPRIM 800; 160 MG/1; MG/1
1 TABLET ORAL 2 TIMES DAILY
Qty: 20 TABLET | Refills: 0 | Status: SHIPPED | OUTPATIENT
Start: 2021-09-22 | End: 2021-10-02

## 2021-09-22 RX ORDER — SULFAMETHOXAZOLE AND TRIMETHOPRIM 800; 160 MG/1; MG/1
1 TABLET ORAL ONCE
Status: COMPLETED | OUTPATIENT
Start: 2021-09-22 | End: 2021-09-22

## 2021-09-22 RX ORDER — HYDROCODONE BITARTRATE AND ACETAMINOPHEN 5; 325 MG/1; MG/1
1 TABLET ORAL ONCE
Status: COMPLETED | OUTPATIENT
Start: 2021-09-22 | End: 2021-09-22

## 2021-09-22 RX ORDER — ETODOLAC 400 MG/1
400 TABLET, FILM COATED ORAL 2 TIMES DAILY
Qty: 14 TABLET | Refills: 0 | Status: SHIPPED | OUTPATIENT
Start: 2021-09-22 | End: 2022-07-10

## 2021-09-22 RX ADMIN — HYDROCODONE BITARTRATE AND ACETAMINOPHEN 1 TABLET: 5; 325 TABLET ORAL at 16:41

## 2021-09-22 RX ADMIN — SULFAMETHOXAZOLE AND TRIMETHOPRIM 1 TABLET: 800; 160 TABLET ORAL at 16:41

## 2021-09-22 RX ADMIN — CEPHALEXIN 500 MG: 500 CAPSULE ORAL at 16:41

## 2021-09-22 ASSESSMENT — ENCOUNTER SYMPTOMS
ALLERGIC/IMMUNOLOGIC NEGATIVE: 1
TROUBLE SWALLOWING: 0
SHORTNESS OF BREATH: 0
APNEA: 0
EYE PAIN: 0
ABDOMINAL PAIN: 0
COLOR CHANGE: 0

## 2021-09-22 ASSESSMENT — PAIN DESCRIPTION - LOCATION: LOCATION: BUTTOCKS

## 2021-09-22 ASSESSMENT — PAIN SCALES - GENERAL
PAINLEVEL_OUTOF10: 10
PAINLEVEL_OUTOF10: 10

## 2021-09-22 ASSESSMENT — PAIN DESCRIPTION - DESCRIPTORS: DESCRIPTORS: BURNING;THROBBING;STABBING

## 2021-09-22 ASSESSMENT — PAIN DESCRIPTION - ORIENTATION: ORIENTATION: RIGHT

## 2021-09-22 NOTE — ED PROVIDER NOTES
[address] [facility] ED  eMERGENCYdEPARTMENT eNCOUnter      Pt Name: Aman Poon  MRN: [id number]  [place] 1995  Date of evaluation: 9/22/2021  Provider:Adam Wesley PA-C    CHIEF COMPLAINT       Chief Complaint   Patient presents with    Abscess     abscess on right buttock since sun         HISTORY OF PRESENT ILLNESS  (Location/Symptom, Timing/Onset, Context/Setting, Quality, Duration, Modifying Factors, Severity.)   Aman Poon is a 32 y.o. female who presents to the emergency department with an abscess to the right gluteal cleft region, ongoing since Sunday. Patient rates the pain at a 10 out of 10 and denies any drainage from the area. Patient denies any fevers or chills and no radiation of pain into the abdomen. HPI    Nursing Notes were reviewed and I agree. REVIEW OF SYSTEMS    (2-9 systems for level 4, 10 or more for level 5)     Review of Systems   Constitutional: Negative for diaphoresis and fever. HENT: Negative for hearing loss and trouble swallowing. Eyes: Negative for pain. Respiratory: Negative for apnea and shortness of breath. Cardiovascular: Negative for chest pain. Gastrointestinal: Negative for abdominal pain. Endocrine: Negative. Genitourinary: Negative for hematuria. Musculoskeletal: Negative for neck pain and neck stiffness. Skin: Positive for wound. Negative for color change. Allergic/Immunologic: Negative. Neurological: Negative for dizziness and numbness. Hematological: Negative. Psychiatric/Behavioral: Negative. All other systems reviewed and are negative. Except as noted above the remainder of the review of systems was reviewed and negative.        PAST MEDICAL HISTORY     Past Medical History:   Diagnosis Date    Acute superficial gastritis without hemorrhage 7/7/2020    Anxiety     Asthma     Cervicalgia 7/7/2020    Related to [facility] 11/11/2019    Menorrhagia with regular cycle 7/7/2020    Muscle spasm 7/7/2020    Frequency of Communication with Friends and Family:     Frequency of Social Gatherings with Friends and Family:     Attends [employer] Services:     Active Member of Clubs or Organizations:     Attends Club or Organization Meetings:     Marital Status:    Intimate Partner Violence:     Fear of Current or Ex-Partner:     Emotionally Abused:     Physically Abused:     Sexually Abused:        SCREENINGS           PHYSICAL EXAM    (up to 7 forlevel 4, 8 or more for level 5)     ED Triage Vitals   BP Temp Temp Source Pulse Resp SpO2 Height Weight   09/22/21 1604 09/22/21 1602 09/22/21 1602 09/22/21 1602 09/22/21 1602 09/22/21 1602 09/22/21 1602 09/22/21 1602   107/76 97.4 °F (36.3 °C) Temporal 100 19 99 % 5' 1\" (1.549 m) 130 lb (59 kg)       Physical Exam  Vitals and nursing note reviewed. Exam conducted with a chaperone present. Constitutional:       General: She is not in acute distress. Appearance: She is well-developed. She is not diaphoretic. HENT:      Head: Normocephalic and atraumatic. Mouth/Throat:      Pharynx: No oropharyngeal exudate. Eyes:      General: No scleral icterus. Conjunctiva/sclera: Conjunctivae normal.      Pupils: Pupils are equal, round, and reactive to light. Neck:      Trachea: No tracheal deviation. Cardiovascular:      Rate and Rhythm: Normal rate. Heart sounds: Normal heart sounds. Pulmonary:      Effort: Pulmonary effort is normal. No respiratory distress. Breath sounds: Normal breath sounds. Abdominal:      General: Bowel sounds are normal. There is no distension. Palpations: Abdomen is soft. Genitourinary:     Exam position: Prone. Musculoskeletal:         General: Normal range of motion. Cervical back: Normal range of motion and neck supple. Skin:     General: Skin is warm and dry. Findings: No erythema or rash. Neurological:      Mental Status: She is alert and oriented to person, place, and time.       Cranial Nerves: No cranial nerve deficit. Motor: No abnormal muscle tone. Psychiatric:         Behavior: Behavior normal.         Thought Content: Thought content normal.         Judgment: Judgment normal.           DIAGNOSTIC RESULTS     RADIOLOGY:   Non-plain film images such as CT, Ultrasound and MRI are read by the radiologist. Plain radiographic images are visualized and preliminarilyinterpreted by Dorrene Soulier, PA-C with the below findings:      Interpretation per the Radiologist below, if available at the time of this note:    No orders to display       LABS:  Labs Reviewed   CULTURE, ANAEROBIC AND AEROBIC       All other labs were within normal range or not returnedas of this dictation. EMERGENCYDEPARTMENT COURSE and DIFFERENTIAL DIAGNOSIS/MDM:   Vitals:    Vitals:    09/22/21 1602 09/22/21 1604   BP:  107/76   Pulse: 100    Resp: 19    Temp: 97.4 °F (36.3 °C)    TempSrc: Temporal    SpO2: 99%    Weight: 130 lb (59 kg)    Height: 5' 1\" (1.549 m)        REASSESSMENT      Presents with a large abscess to the right gluteal region. Patient will be treated with both Bactrim and Keflex and referred to general surgery for follow-up given that she was unable to tolerate packing. Wound culture is obtained and pending    MDM    PROCEDURES:    Incision/Drainage    Date/Time: 9/22/2021 4:35 PM  Performed by: Dorrene Soulier, PA-C  Authorized by: Dorrene Soulier, PA-C     Consent:     Consent obtained:  Verbal    Consent given by:  Patient    Risks discussed:  Bleeding, incomplete drainage and pain  Location:     Type:  Abscess    Size:  2 cm    Location:  Anogenital    Anogenital location:  Gluteal cleft  Pre-procedure details:     Skin preparation:  Hibiclens  Anesthesia (see MAR for exact dosages):      Anesthesia method:  Local infiltration    Local anesthetic:  Lidocaine 1% w/o epi  Procedure type:     Complexity:  Simple  Procedure details:     Needle aspiration: no      Incision types:  Single straight    Incision depth:  Subcutaneous    Scalpel blade:  11    Wound management:  Probed and deloculated    Drainage:  Purulent    Drainage amount:  Copious    Wound treatment:  Wound left open    Packing materials:  None  Comments:      Patient did not tolerate packing          FINAL IMPRESSION      1. Abscess          DISPOSITION/PLAN   DISPOSITION Decision To Discharge 09/22/2021 04:37:35 PM      PATIENT REFERRED TO:  Karla Oquendo MD  [address] [facility] [facility]  [phone]    Call in 1 day      Karla Oquendo MD  [address] [facility] [facility]  [phone]            DISCHARGE MEDICATIONS:  New Prescriptions    CEPHALEXIN (KEFLEX) 500 MG CAPSULE    Take 1 capsule by mouth 4 times daily    ETODOLAC (LODINE) 400 MG TABLET    Take 1 tablet by mouth 2 times daily    HYDROCODONE-ACETAMINOPHEN (NORCO) 5-325 MG PER TABLET    Take 1 tablet by mouth every 6 hours as needed for Pain for up to 3 days. Intended supply: 3 days.  Take lowest dose possible to manage pain    SULFAMETHOXAZOLE-TRIMETHOPRIM (BACTRIM DS;SEPTRA DS) 800-160 MG PER TABLET    Take 1 tablet by mouth 2 times daily for 10 days       (Please note that portions of this note were completed with a voice recognition program.  Efforts were made to edit the dictations but occasionally words are mis-transcribed.)    MU Nichols PA-C  09/22/21 [id number]

## 2021-09-22 NOTE — ED TRIAGE NOTES
Pt states that she has an abscess on her right buttock that is causing her pain that she rates at 10/10 since sunday.  Pt states that she is unable to sit and it hurts to walk

## 2021-09-25 LAB
ANAEROBIC CULTURE: ABNORMAL
ANAEROBIC CULTURE: ABNORMAL
GRAM STAIN RESULT: ABNORMAL
ORGANISM: ABNORMAL
ORGANISM: ABNORMAL
WOUND/ABSCESS: ABNORMAL

## 2021-11-06 ENCOUNTER — HOSPITAL ENCOUNTER (EMERGENCY)
Age: 26
Discharge: HOME | End: 2021-11-06
Payer: MEDICARE

## 2021-11-06 VITALS
DIASTOLIC BLOOD PRESSURE: 67 MMHG | SYSTOLIC BLOOD PRESSURE: 109 MMHG | OXYGEN SATURATION: 100 % | HEIGHT: 61 IN | BODY MASS INDEX: 24.55 KG/M2 | RESPIRATION RATE: 16 BRPM | HEART RATE: 94 BPM | WEIGHT: 130 LBS | TEMPERATURE: 97.8 F

## 2021-11-06 DIAGNOSIS — L02.91 ABSCESS: Primary | ICD-10-CM

## 2021-11-06 LAB
HCG, URINE, POC: NEGATIVE
Lab: NORMAL
NEGATIVE QC PASS/FAIL: NORMAL
POSITIVE QC PASS/FAIL: NORMAL

## 2021-11-06 PROCEDURE — 96372 THER/PROPH/DIAG INJ SC/IM: CPT

## 2021-11-06 PROCEDURE — 6370000000 HC RX 637 (ALT 250 FOR IP): Performed by: PHYSICIAN ASSISTANT

## 2021-11-06 PROCEDURE — 99284 EMERGENCY DEPT VISIT MOD MDM: CPT

## 2021-11-06 PROCEDURE — 6360000002 HC RX W HCPCS: Performed by: PHYSICIAN ASSISTANT

## 2021-11-06 RX ORDER — KETOROLAC TROMETHAMINE 30 MG/ML
30 INJECTION, SOLUTION INTRAMUSCULAR; INTRAVENOUS ONCE
Status: COMPLETED | OUTPATIENT
Start: 2021-11-06 | End: 2021-11-06

## 2021-11-06 RX ORDER — CEPHALEXIN 500 MG/1
500 CAPSULE ORAL 4 TIMES DAILY
Qty: 28 CAPSULE | Refills: 0 | Status: SHIPPED | OUTPATIENT
Start: 2021-11-06 | End: 2021-11-13

## 2021-11-06 RX ORDER — SULFAMETHOXAZOLE AND TRIMETHOPRIM 800; 160 MG/1; MG/1
1 TABLET ORAL 2 TIMES DAILY
Qty: 14 TABLET | Refills: 0 | Status: SHIPPED | OUTPATIENT
Start: 2021-11-06 | End: 2021-11-13

## 2021-11-06 RX ORDER — HYDROCODONE BITARTRATE AND ACETAMINOPHEN 5; 325 MG/1; MG/1
1 TABLET ORAL EVERY 8 HOURS PRN
Qty: 6 TABLET | Refills: 0 | Status: SHIPPED | OUTPATIENT
Start: 2021-11-06 | End: 2021-11-08

## 2021-11-06 RX ORDER — HYDROCODONE BITARTRATE AND ACETAMINOPHEN 5; 325 MG/1; MG/1
1 TABLET ORAL ONCE
Status: COMPLETED | OUTPATIENT
Start: 2021-11-06 | End: 2021-11-06

## 2021-11-06 RX ADMIN — KETOROLAC TROMETHAMINE 30 MG: 30 INJECTION, SOLUTION INTRAMUSCULAR at 12:35

## 2021-11-06 RX ADMIN — HYDROCODONE BITARTRATE AND ACETAMINOPHEN 1 TABLET: 5; 325 TABLET ORAL at 13:16

## 2021-11-06 ASSESSMENT — PAIN SCALES - GENERAL
PAINLEVEL_OUTOF10: 10

## 2021-11-06 ASSESSMENT — ENCOUNTER SYMPTOMS
SHORTNESS OF BREATH: 0
GASTROINTESTINAL NEGATIVE: 1

## 2021-11-06 ASSESSMENT — PAIN DESCRIPTION - DESCRIPTORS: DESCRIPTORS: ACHING

## 2021-11-06 ASSESSMENT — PAIN DESCRIPTION - PAIN TYPE: TYPE: ACUTE PAIN

## 2021-11-06 ASSESSMENT — PAIN DESCRIPTION - PROGRESSION: CLINICAL_PROGRESSION: NOT CHANGED

## 2021-11-06 ASSESSMENT — PAIN DESCRIPTION - FREQUENCY: FREQUENCY: CONTINUOUS

## 2021-11-06 ASSESSMENT — PAIN DESCRIPTION - LOCATION: LOCATION: BUTTOCKS

## 2021-11-06 NOTE — ED TRIAGE NOTES
A & Ox4. Skin pink warm and dry. Pt tearful throughout triage. Pt has small abscess noted to medial aspect of right buttocks. No drainage noted. Denies any other complaints at this time. States she took all of her antibiotics a month ago and then it just came back.

## 2021-11-06 NOTE — ED NOTES
Pt is very anxious about the abscess and concerned something is going to happen when she is at home alone. I did my best to explain the wound and that there is nothing to worry about as lone as she takes the antibiotics and follows up with the doctor. Pt seemed to be a little more calm but concerned.      Jong Larkin, RN  11/06/21 [id number]

## 2021-11-06 NOTE — ED PROVIDER NOTES
[address] [facility] ED  eMERGENCY dEPARTMENT eNCOUnter      Pt Name: Mago Ashraf  MRN: [id number]  [place] 1995  Date of evaluation: 11/6/2021  Provider: Susy lFanagan PA-C        HISTORY OF PRESENT ILLNESS    Mago Ashraf is a 32 y.o. female per chart review has ah/o  Tobacco dependency and Bipolar d/o; presenting to the ED for acute abscess on the right gluteal cleft that began 2-3 days ago. Patient states that she had an abscess in the same area last month that resolved with antibiotics. She denies any fever, chills, n/v/d, abdominal pain, or any other physical complaints. REVIEW OF SYSTEMS       Review of Systems   Constitutional: Negative for fever. Respiratory: Negative for shortness of breath. Cardiovascular: Negative for chest pain. Gastrointestinal: Negative. Skin: Positive for wound. All other systems reviewed and are negative. Except as noted above the remainder of the review of systems was reviewed and negative. PAST MEDICAL HISTORY     Past Medical History:   Diagnosis Date    Acute superficial gastritis without hemorrhage 7/7/2020    Anxiety     Asthma     Cervicalgia 7/7/2020    Related to [address] 11/11/2019    Menorrhagia with regular cycle 7/7/2020    Muscle spasm 7/7/2020    Seizures (HCC)          SURGICAL HISTORY     History reviewed. No pertinent surgical history. CURRENT MEDICATIONS       Previous Medications    ALBUTEROL SULFATE HFA (PROAIR HFA) 108 (90 BASE) MCG/ACT INHALER    1-2 puffs, Use every 4 hours while awake for PRN wheezing  Dispense with SPACER and Instruct on use. May sub Ventolin or Proventil as needed per [employer]. ETODOLAC (LODINE) 400 MG TABLET    Take 1 tablet by mouth 2 times daily    FAMOTIDINE (PEPCID) 20 MG TABLET    Take 1 tablet by mouth 2 times daily    QUETIAPINE (SEROQUEL) 25 MG TABLET    Take 1 tablet by mouth 2 times daily       ALLERGIES     Patient has no known allergies.     FAMILY HISTORY Family History   Problem Relation Age of Onset    Substance Abuse Father     Kidney Disease Father     Liver Disease Father           SOCIAL HISTORY       Social History     Socioeconomic History    Marital status: Single     Spouse name: None    Number of children: None    Years of education: None    Highest education level: None   Occupational History    None   Tobacco Use    Smoking status: Current Every Day Smoker     Packs/day: 2.00     Years: 7.00     Pack years: 14.00     Types: Cigarettes    Smokeless tobacco: Never Used   Vaping Use    Vaping Use: Former    Substances: Flavoring    Devices: Disposable   Substance and Sexual Activity    Alcohol use: Yes     Comment: special occasions    Drug use: Not Currently     Types: Marijuana Zamora Krystal)     Comment: Last smoked Aug 2021    Sexual activity: Yes     Partners: Male   Other Topics Concern    None   Social History Narrative    None     Social Determinants of Health     Financial Resource Strain:     Difficulty of Paying Living Expenses: Not on file   Food Insecurity:     Worried About Running Out of Food in the Last Year: Not on file    Brad of Food in the Last Year: Not on file   Transportation Needs:     Lack of Transportation (Medical): Not on file    Lack of Transportation (Non-Medical):  Not on file   Physical Activity:     Days of Exercise per Week: Not on file    Minutes of Exercise per Session: Not on file   Stress:     Feeling of Stress : Not on file   Social Connections:     Frequency of Communication with Friends and Family: Not on file    Frequency of Social Gatherings with Friends and Family: Not on file    Attends Hindu Services: Not on file    Active Member of Clubs or Organizations: Not on file    Attends Club or Organization Meetings: Not on file    Marital Status: Not on file   Intimate Partner Violence:     Fear of Current or Ex-Partner: Not on file    Emotionally Abused: Not on file    Physically Abused: Not on file    Sexually Abused: Not on file   Housing Stability:     Unable to Pay for Housing in the Last Year: Not on file    Number of Places Lived in the Last Year: Not on file    Unstable Housing in the Last Year: Not on file         PHYSICAL EXAM        ED Triage Vitals [11/06/21 1139]   BP Temp Temp Source Pulse Resp SpO2 Height Weight   (!) 100/42 97.8 °F (36.6 °C) Temporal 93 16 100 % 5' 1\" (1.549 m) 130 lb (59 kg)       Physical Exam  Vitals and nursing note reviewed. Constitutional:       General: She is not in acute distress. Appearance: She is well-developed. She is not diaphoretic. HENT:      Head: Normocephalic and atraumatic. Right Ear: External ear normal.      Left Ear: External ear normal.      Nose: Nose normal.      Mouth/Throat:      Pharynx: No oropharyngeal exudate. Eyes:      General: No scleral icterus. Right eye: No discharge. Left eye: No discharge. Conjunctiva/sclera: Conjunctivae normal.      Pupils: Pupils are equal, round, and reactive to light. Cardiovascular:      Rate and Rhythm: Normal rate. Pulmonary:      Effort: Pulmonary effort is normal.   Musculoskeletal:         General: Normal range of motion. Cervical back: Normal range of motion and neck supple. Skin:     General: Skin is warm. Coloration: Skin is not pale. Findings: Abscess (right glute-no lymphangitis or surrounding erythema, the size of a eugenia, nonfluctuant) present. No erythema or rash. Neurological:      Mental Status: She is alert and oriented to person, place, and time. Coordination: Coordination normal.   Psychiatric:         Behavior: Behavior normal.         Thought Content:  Thought content normal.         Judgment: Judgment normal.           LABS:  Labs Reviewed - No data to display      MDM:   Vitals:    Vitals:    11/06/21 1139   BP: (!) 100/42   Pulse: 93   Resp: 16   Temp: 97.8 °F (36.6 °C)   TempSrc: Temporal   SpO2: 100% Weight: 130 lb (59 kg)   Height: 5' 1\" (1.549 m)           PROCEDURES:  Unlessotherwise noted below, none      Procedures      FINAL IMPRESSION      1. Abscess          DISPOSITION/PLAN   DISPOSITION Decision To Discharge 11/06/2021 12:33:43 PM  Nursing notes, medical records and triage notes reviewed. Vital signs reviewed. This is a 32year old male per chart review has ah/o  Tobacco dependency and Bipolar d/o; presenting to the ED for acute abscess on the right gluteal cleft that began 2-3 days ago.          The patient and/or family  -had the results of all tests and diagnosis explained to them  -Given both verbal and written discharge instructions  -Were instructed of the importance of close follow-up  -Were told that close follow-up is essential for good health and good outcomes    Essie Ho PA-C (electronically signed)  Emergency Physician Assistant         Essie Ho PA-C  11/06/21 [address] [place] [address], [facility]  11/06/21 [address] [place] [address], [place]  11/06/21 [id number]

## 2021-12-10 ENCOUNTER — HOSPITAL ENCOUNTER (EMERGENCY)
Age: 26
Discharge: HOME | End: 2021-12-10
Payer: MEDICARE

## 2021-12-10 VITALS
BODY MASS INDEX: 24.55 KG/M2 | TEMPERATURE: 97.3 F | OXYGEN SATURATION: 100 % | HEIGHT: 61 IN | HEART RATE: 112 BPM | WEIGHT: 130 LBS | DIASTOLIC BLOOD PRESSURE: 64 MMHG | SYSTOLIC BLOOD PRESSURE: 116 MMHG | RESPIRATION RATE: 18 BRPM

## 2021-12-10 DIAGNOSIS — S01.81XA FOREHEAD LACERATION, INITIAL ENCOUNTER: Primary | ICD-10-CM

## 2021-12-10 PROCEDURE — 12011 RPR F/E/E/N/L/M 2.5 CM/<: CPT

## 2021-12-10 PROCEDURE — 6370000000 HC RX 637 (ALT 250 FOR IP): Performed by: PHYSICIAN ASSISTANT

## 2021-12-10 PROCEDURE — 99283 EMERGENCY DEPT VISIT LOW MDM: CPT

## 2021-12-10 RX ORDER — ACETAMINOPHEN 325 MG/1
650 TABLET ORAL ONCE
Status: COMPLETED | OUTPATIENT
Start: 2021-12-10 | End: 2021-12-10

## 2021-12-10 RX ORDER — ONDANSETRON 4 MG/1
4 TABLET, ORALLY DISINTEGRATING ORAL ONCE
Status: COMPLETED | OUTPATIENT
Start: 2021-12-10 | End: 2021-12-10

## 2021-12-10 RX ORDER — ACETAMINOPHEN 325 MG/1
650 TABLET ORAL EVERY 6 HOURS PRN
Qty: 20 TABLET | Refills: 0 | Status: SHIPPED | OUTPATIENT
Start: 2021-12-10

## 2021-12-10 RX ADMIN — ACETAMINOPHEN 650 MG: 325 TABLET ORAL at 15:30

## 2021-12-10 RX ADMIN — ONDANSETRON 4 MG: 4 TABLET, ORALLY DISINTEGRATING ORAL at 15:28

## 2021-12-10 ASSESSMENT — ENCOUNTER SYMPTOMS
APNEA: 0
GASTROINTESTINAL NEGATIVE: 1
TROUBLE SWALLOWING: 0
SHORTNESS OF BREATH: 0
EYES NEGATIVE: 1
EYE PAIN: 0
RESPIRATORY NEGATIVE: 1
COLOR CHANGE: 0
ALLERGIC/IMMUNOLOGIC NEGATIVE: 1
ABDOMINAL PAIN: 0

## 2021-12-10 ASSESSMENT — PAIN SCALES - GENERAL
PAINLEVEL_OUTOF10: 6
PAINLEVEL_OUTOF10: 6

## 2021-12-10 ASSESSMENT — PAIN DESCRIPTION - PAIN TYPE: TYPE: ACUTE PAIN

## 2021-12-10 ASSESSMENT — PAIN DESCRIPTION - LOCATION: LOCATION: HEAD

## 2021-12-10 NOTE — ED PROVIDER NOTES
[address] [facility] ED  eMERGENCYdEPARTMENT eNCOUnter      Pt Name: Yadira Jimenes  MRN: [id number]  [place] 1995  Date of evaluation: 12/10/2021  Provider:Kaitlin Laureano PA-C    CHIEF COMPLAINT       Chief Complaint   Patient presents with    Laceration     laceration to head 1-2 inch laceration window fell on head         HISTORY OF PRESENT ILLNESS  (Location/Symptom, Timing/Onset, Context/Setting, Quality, Duration, Modifying Factors, Severity.)   Yadira Jimenes is a 32 y.o. female who presents to the emergency department with a 2.5 cm laceration to the left anterior forehead after being cut by a piece of glass from a broken window prior to arrival. Complains of headache but no LOC associated with injury    HPI    Nursing Notes were reviewed and I agree. REVIEW OF SYSTEMS    (2-9 systems for level 4, 10 or more for level 5)     Review of Systems   Constitutional: Negative. Negative for diaphoresis and fever. HENT: Negative. Negative for hearing loss and trouble swallowing. Eyes: Negative. Negative for pain. Respiratory: Negative. Negative for apnea and shortness of breath. Cardiovascular: Negative. Negative for chest pain. Gastrointestinal: Negative. Negative for abdominal pain. Endocrine: Negative. Genitourinary: Negative. Negative for hematuria. Musculoskeletal: Negative. Negative for neck pain and neck stiffness. Skin: Positive for wound. Negative for color change. Allergic/Immunologic: Negative. Neurological: Positive for headaches. Negative for dizziness and numbness. Hematological: Negative. Psychiatric/Behavioral: Negative. All other systems reviewed and are negative. Except as noted above the remainder of the review of systems was reviewed and negative.        PAST MEDICAL HISTORY     Past Medical History:   Diagnosis Date    Acute superficial gastritis without hemorrhage 7/7/2020    Anxiety     Asthma     Cervicalgia 7/7/2020    Related Transportation Needs:     Lack of Transportation (Medical): Not on file    Lack of Transportation (Non-Medical): Not on file   Physical Activity:     Days of Exercise per Week: Not on file    Minutes of Exercise per Session: Not on file   Stress:     Feeling of Stress : Not on file   Social Connections:     Frequency of Communication with Friends and Family: Not on file    Frequency of Social Gatherings with Friends and Family: Not on file    Attends Pentecostalism Services: Not on file    Active Member of [address] [place] [address] or Organizations: Not on file    Attends Club or Organization Meetings: Not on file    Marital Status: Not on file   Intimate Partner Violence:     Fear of Current or Ex-Partner: Not on file    Emotionally Abused: Not on file    Physically Abused: Not on file    Sexually Abused: Not on file   Housing Stability:     Unable to Pay for Housing in the Last Year: Not on file    Number of [place] in the Last Year: Not on file    Unstable Housing in the Last Year: Not on file       SCREENINGS           PHYSICAL EXAM    (up to 7 forlevel 4, 8 or more for level 5)     ED Triage Vitals [12/10/21 1510]   BP Temp Temp src Pulse Resp SpO2 Height Weight   116/64 97.3 °F (36.3 °C) -- 112 18 100 % 5' 1\" (1.549 m) 130 lb (59 kg)       Physical Exam  Vitals and nursing note reviewed. Constitutional:       General: She is not in acute distress. Appearance: She is well-developed. She is not diaphoretic. HENT:      Head: Normocephalic and atraumatic. Comments: Linear laceration     Mouth/Throat:      Pharynx: No oropharyngeal exudate. Eyes:      General: No scleral icterus. Conjunctiva/sclera: Conjunctivae normal.      Pupils: Pupils are equal, round, and reactive to light. Neck:      Trachea: No tracheal deviation. Cardiovascular:      Rate and Rhythm: Normal rate and regular rhythm. Heart sounds: Normal heart sounds. No murmur heard.       Pulmonary:      Effort: Pulmonary effort is normal. No respiratory distress. Breath sounds: Normal breath sounds. No wheezing or rales. Abdominal:      General: Bowel sounds are normal. There is no distension. Palpations: Abdomen is soft. Tenderness: There is no abdominal tenderness. Musculoskeletal:         General: Normal range of motion. Cervical back: Normal range of motion and neck supple. Skin:     General: Skin is warm and dry. Findings: No erythema or rash. Neurological:      Mental Status: She is alert and oriented to person, place, and time. Cranial Nerves: No cranial nerve deficit. Motor: No abnormal muscle tone. Psychiatric:         Behavior: Behavior normal.         Thought Content: Thought content normal.         Judgment: Judgment normal.           DIAGNOSTIC RESULTS     RADIOLOGY:   Non-plain film images such as CT, Ultrasound and MRI are read by the radiologist. Plain radiographic images are visualized and preliminarilyinterpreted by Norma Kelley PA-C with the below findings:        Interpretation per the Radiologist below, if available at the time of this note:    No orders to display       LABS:  Labs Reviewed - No data to display    All other labs were within normal range or not returnedas of this dictation. EMERGENCYDEPARTMENT COURSE and DIFFERENTIAL DIAGNOSIS/MDM:   Vitals:    Vitals:    12/10/21 1510   BP: 116/64   Pulse: 112   Resp: 18   Temp: 97.3 °F (36.3 °C)   SpO2: 100%   Weight: 130 lb (59 kg)   Height: 5' 1\" (1.549 m)       REASSESSMENT        Patient tolerated procedure well without immediate complications. Follow-up in my care provider as needed. Return here if symptoms worsen or if new concerning symptoms arise. Patient verbalizes understanding of plan and discharge has no further questions. Patient unsure when last tetanus shot was received and refusing on today's evaluation.   Patient states she will call her family doctor's office and follow-up and see if it is currently up-to-date. Patient assuming all risks upon herself. MDM    PROCEDURES:    Lac Repair    Date/Time: 12/10/2021 3:52 PM  Performed by: Jyoti Fowler PA-C  Authorized by: Jyoti Fowler PA-C     Consent:     Consent obtained:  Verbal    Consent given by:  Patient    Risks discussed:  Infection and poor cosmetic result    Alternatives discussed:  No treatment  Anesthesia (see MAR for exact dosages): Anesthesia method:  Topical application  Laceration details:     Location:  Face    Face location:  Forehead    Wound length (cm): 2 cm. Repair type:     Repair type:  Simple  Pre-procedure details:     Preparation:  Patient was prepped and draped in usual sterile fashion  Exploration:     Hemostasis achieved with:  Direct pressure    Contaminated: no    Treatment:     Area cleansed with:  Hibiclens    Amount of cleaning:  Standard  Skin repair:     Repair method:  Steri-Strips and tissue adhesive    Number of Steri-Strips: 4. Approximation:     Approximation:  Close  Post-procedure details:     Dressing:  Open (no dressing)    Patient tolerance of procedure: Tolerated well, no immediate complications          FINAL IMPRESSION      1.  Forehead laceration, initial encounter          DISPOSITION/PLAN   DISPOSITION Decision To Discharge 12/10/2021 03:50:40 PM      PATIENT REFERRED TO:  Dawna Marrero MD  [address] [address]  [phone]          [address] [place] [address] 124  [address] [facility] [address]  [phone]    As needed      DISCHARGE MEDICATIONS:  New Prescriptions    ACETAMINOPHEN (AMINOFEN) 325 MG TABLET    Take 2 tablets by mouth every 6 hours as needed for Pain       (Please note that portions of this note were completed with a voice recognition program.  Efforts were made to edit the dictations but occasionally words are mis-transcribed.)    MU Wallace PA-C  12/10/21 1556

## 2022-01-25 ENCOUNTER — APPOINTMENT (OUTPATIENT)
Dept: GENERAL RADIOLOGY | Age: 27
End: 2022-01-25
Payer: MEDICARE

## 2022-01-25 ENCOUNTER — HOSPITAL ENCOUNTER (EMERGENCY)
Age: 27
Discharge: HOME | End: 2022-01-25
Payer: MEDICARE

## 2022-01-25 VITALS
WEIGHT: 130 LBS | TEMPERATURE: 98.7 F | HEIGHT: 61 IN | RESPIRATION RATE: 16 BRPM | SYSTOLIC BLOOD PRESSURE: 110 MMHG | DIASTOLIC BLOOD PRESSURE: 60 MMHG | OXYGEN SATURATION: 98 % | HEART RATE: 89 BPM | BODY MASS INDEX: 24.55 KG/M2

## 2022-01-25 DIAGNOSIS — J03.90 EXUDATIVE TONSILLITIS: Primary | ICD-10-CM

## 2022-01-25 DIAGNOSIS — Z71.6 ENCOUNTER FOR SMOKING CESSATION COUNSELING: ICD-10-CM

## 2022-01-25 LAB — STREP GRP A PCR: NEGATIVE

## 2022-01-25 PROCEDURE — 6370000000 HC RX 637 (ALT 250 FOR IP): Performed by: PHYSICIAN ASSISTANT

## 2022-01-25 PROCEDURE — 70360 X-RAY EXAM OF NECK: CPT

## 2022-01-25 PROCEDURE — 87651 STREP A DNA AMP PROBE: CPT

## 2022-01-25 PROCEDURE — 99283 EMERGENCY DEPT VISIT LOW MDM: CPT

## 2022-01-25 RX ORDER — TRAMADOL HYDROCHLORIDE 50 MG/1
50 TABLET ORAL ONCE
Status: COMPLETED | OUTPATIENT
Start: 2022-01-25 | End: 2022-01-25

## 2022-01-25 RX ORDER — AZITHROMYCIN 250 MG/1
TABLET, FILM COATED ORAL
Qty: 6 TABLET | Refills: 0 | Status: SHIPPED | OUTPATIENT
Start: 2022-01-25 | End: 2022-02-04

## 2022-01-25 RX ORDER — PREDNISONE 20 MG/1
40 TABLET ORAL ONCE
Status: COMPLETED | OUTPATIENT
Start: 2022-01-25 | End: 2022-01-25

## 2022-01-25 RX ORDER — PREDNISONE 10 MG/1
40 TABLET ORAL DAILY
Qty: 20 TABLET | Refills: 0 | Status: SHIPPED | OUTPATIENT
Start: 2022-01-25 | End: 2022-01-30

## 2022-01-25 RX ADMIN — PREDNISONE 40 MG: 20 TABLET ORAL at 15:01

## 2022-01-25 RX ADMIN — TRAMADOL HYDROCHLORIDE 50 MG: 50 TABLET, COATED ORAL at 15:01

## 2022-01-25 ASSESSMENT — PAIN DESCRIPTION - FREQUENCY: FREQUENCY: CONTINUOUS

## 2022-01-25 ASSESSMENT — ENCOUNTER SYMPTOMS
ABDOMINAL DISTENTION: 0
SHORTNESS OF BREATH: 0
NAUSEA: 1
COUGH: 0
APNEA: 0
VOICE CHANGE: 0
ABDOMINAL PAIN: 0
EYE DISCHARGE: 0
ANAL BLEEDING: 0
VOMITING: 0

## 2022-01-25 ASSESSMENT — PAIN DESCRIPTION - PAIN TYPE: TYPE: ACUTE PAIN

## 2022-01-25 ASSESSMENT — PAIN SCALES - GENERAL
PAINLEVEL_OUTOF10: 10
PAINLEVEL_OUTOF10: 9

## 2022-01-25 ASSESSMENT — PAIN DESCRIPTION - DESCRIPTORS: DESCRIPTORS: ACHING

## 2022-01-25 ASSESSMENT — PAIN DESCRIPTION - ORIENTATION: ORIENTATION: LEFT

## 2022-01-25 ASSESSMENT — PAIN DESCRIPTION - LOCATION: LOCATION: THROAT

## 2022-01-25 NOTE — ED PROVIDER NOTES
[address] [facility] ED  eMERGENCY dEPARTMENT eNCOUnter      Pt Name: Ashely Marshall  MRN: [id number]  [place] 1995  Date of evaluation: 1/25/2022  Provider: Tamera Gudino PA-C    CHIEF COMPLAINT       Chief Complaint   Patient presents with    Pharyngitis     Times three days          HISTORY OF PRESENT ILLNESS   (Location/Symptom, Timing/Onset,Context/Setting, Quality, Duration, Modifying Factors, Severity)  Note limiting factors. Ashely Marshall is a 32 y.o. female who presents to the emergency department pt has three day hx of left sided sore throat with similar sx 2-3 weeks ago. Pt sts difficult to swallow. Pt + nausea. Pt denies vomiting/fever/chills. She took ibuprofen this morning. Symptoms mild to moderate in severity nothing improves/worsens symptoms. HPI    NursingNotes were reviewed. REVIEW OF SYSTEMS    (2-9 systems for level 4, 10 or more for level 5)     Review of Systems   Constitutional: Negative for activity change, appetite change and unexpected weight change. HENT: Negative for ear discharge, nosebleeds and voice change. Eyes: Negative for discharge. Respiratory: Negative for apnea, cough and shortness of breath. Cardiovascular: Negative for chest pain. Gastrointestinal: Positive for nausea. Negative for abdominal distention, abdominal pain, anal bleeding and vomiting. Genitourinary: Negative for dysuria and hematuria. Musculoskeletal: Negative for joint swelling. Skin: Negative for pallor. Neurological: Negative for weakness. Hematological: Does not bruise/bleed easily. All other systems reviewed and are negative. Except as noted above the remainder of the review of systems was reviewed and negative.        PAST MEDICAL HISTORY     Past Medical History:   Diagnosis Date    Acute superficial gastritis without hemorrhage 7/7/2020    Anxiety     Asthma     Cervicalgia 7/7/2020    Related to [facility] 11/11/2019    Menorrhagia with regular cycle 7/7/2020    Muscle spasm 7/7/2020    Seizures (HCC)          SURGICALHISTORY     History reviewed. No pertinent surgical history. CURRENT MEDICATIONS       Previous Medications    ACETAMINOPHEN (AMINOFEN) 325 MG TABLET    Take 2 tablets by mouth every 6 hours as needed for Pain    ALBUTEROL SULFATE HFA (PROAIR HFA) 108 (90 BASE) MCG/ACT INHALER    1-2 puffs, Use every 4 hours while awake for PRN wheezing  Dispense with SPACER and Instruct on use. May sub Ventolin or Proventil as needed per [employer]. ETODOLAC (LODINE) 400 MG TABLET    Take 1 tablet by mouth 2 times daily    FAMOTIDINE (PEPCID) 20 MG TABLET    Take 1 tablet by mouth 2 times daily    QUETIAPINE (SEROQUEL) 25 MG TABLET    Take 1 tablet by mouth 2 times daily            Patient has no known allergies.     FAMILY HISTORY       Family History   Problem Relation Age of Onset    Substance Abuse Father     Kidney Disease Father     Liver Disease Father           SOCIAL HISTORY       Social History     Socioeconomic History    Marital status: Single     Spouse name: None    Number of children: None    Years of education: None    Highest education level: None   Occupational History    None   Tobacco Use    Smoking status: Current Every Day Smoker     Packs/day: 2.00     Years: 7.00     Pack years: 14.00     Types: Cigarettes    Smokeless tobacco: Never Used   Vaping Use    Vaping Use: Former    Substances: Flavoring    Devices: Disposable   Substance and Sexual Activity    Alcohol use: Yes     Comment: special occasions    Drug use: Not Currently     Types: Marijuana [place])     Comment: Last smoked Aug 2021    Sexual activity: Yes     Partners: Male   Other Topics Concern    None   Social History Narrative    None     Social Determinants of Health     Financial Resource Strain:     Difficulty of Paying Living Expenses: Not on file   Food Insecurity:     Worried About Running Out of Food in the Last Year: Not on file    Ran Out of Food in the Last Year: Not on file   Transportation Needs:     Lack of Transportation (Medical): Not on file    Lack of Transportation (Non-Medical): Not on file   Physical Activity:     Days of Exercise per Week: Not on file    Minutes of Exercise per Session: Not on file   Stress:     Feeling of Stress : Not on file   Social Connections:     Frequency of Communication with Friends and Family: Not on file    Frequency of Social Gatherings with Friends and Family: Not on file    Attends Alevism Services: Not on file    Active Member of [address] [place] [address] Conduct[employer] or Organizations: Not on file    Attends Club or Organization Meetings: Not on file    Marital Status: Not on file   Intimate Partner Violence:     Fear of Current or Ex-Partner: Not on file    Emotionally Abused: Not on file    Physically Abused: Not on file    Sexually Abused: Not on file   Housing Stability:     Unable to Pay for Housing in the Last Year: Not on file    Number of [place] in the Last Year: Not on file    Unstable Housing in the Last Year: Not on file       SCREENINGS   Ebola Virus Disease (EVD) Screening   Temp: 98.7 °F (37.1 °C)  CIWA Assessment  BP: 110/60  Pulse: 89    PHYSICAL EXAM    (up to 7 for level 4, 8 or more for level 5)     ED Triage Vitals [01/25/22 1358]   BP Temp Temp src Pulse Resp SpO2 Height Weight   110/60 98.7 °F (37.1 °C) -- 89 16 98 % 5' 1\" (1.549 m) 130 lb (59 kg)       Physical Exam  Vitals and nursing note reviewed. Constitutional:       General: She is not in acute distress. Appearance: She is well-developed. HENT:      Head: Normocephalic and atraumatic. Right Ear: Tympanic membrane normal.      Left Ear: Tympanic membrane normal.      Nose: Nose normal.      Mouth/Throat:      Mouth: Mucous membranes are moist. No oral lesions. Tonsils: Tonsillar exudate present. No tonsillar abscesses. 1+ on the right. 1+ on the left. Eyes:      General:         Right eye: No discharge. Left eye: No discharge. Pupils: Pupils are equal, round, and reactive to light. Cardiovascular:      Rate and Rhythm: Normal rate and regular rhythm. Pulses: Normal pulses. Heart sounds: Normal heart sounds. Pulmonary:      Effort: Pulmonary effort is normal. No respiratory distress. Breath sounds: Normal breath sounds. No stridor. Abdominal:      General: Bowel sounds are normal. There is no distension. Palpations: Abdomen is soft. Tenderness: There is no abdominal tenderness. There is no right CVA tenderness or left CVA tenderness. Musculoskeletal:         General: Normal range of motion. Cervical back: Normal range of motion and neck supple. Skin:     General: Skin is warm. Findings: No erythema. Neurological:      General: No focal deficit present. Mental Status: She is alert and oriented to person, place, and time. Psychiatric:         Mood and Affect: Mood normal.         RESULTS     EKG: All EKG's are interpreted by the Emergency Department Physician who either signs or Co-signsthis chart in the absence of a cardiologist.         RADIOLOGY:   [facility] such as CT, Ultrasound and MRI are read by the radiologist. Plain radiographic images are visualized and preliminarily interpreted by the emergency physician with the below findings:         Interpretation per the Radiologist below, if available at the time ofthis note:    XR NECK SOFT TISSUE   Final Result       There are no acute osseous changes. The soft tissues are unremarkable. ED BEDSIDE ULTRASOUND:   Performed by ED Physician - none    LABS:  Labs Reviewed   RAPID STREP SCREEN       All other labs were within normal range or not returned as of this dictation.     EMERGENCY DEPARTMENT COURSE and DIFFERENTIAL DIAGNOSIS/MDM:   Vitals:    Vitals:    01/25/22 1358   BP: 110/60   Pulse: 89   Resp: 16   Temp: 98.7 °F (37.1 °C)   SpO2: 98%   Weight: 130 lb (59 kg) Height: 5' 1\" (1.549 m)            MDM  Number of Diagnoses or Management Options  Encounter for smoking cessation counseling  Exudative tonsillitis  Diagnosis management comments: We discussed smoking cessation times 3-5 minutes. She smokes cigarettes and wants to quit. She is concerned about patches as the side effectes include difficulty sleepin. We discussed nicotine gum which would allow her to take prn as she smokes up to 1 pack per day some dys. She is to follow up with primary care doctor for additonal resources. Will refer to ent. CRITICAL CARE TIME     CONSULTS:  None    PROCEDURES:  Unless otherwise noted below, none     Procedures    FINAL IMPRESSION      1. Exudative tonsillitis    2.  Encounter for smoking cessation counseling          DISPOSITION/PLAN   DISPOSITION Decision To Discharge 01/25/2022 03:03:18 PM      PATIENT REFERRED TO:  Clarissa Mckenzie MD  [address] [facility]  [address] [facility]  [phone]    Call in 1 day      Prem Beal MD  [address] [facility] [address]  [phone]    Call in 1 day      [facility] ED  [address] [address]  [phone]    If symptoms worsen      DISCHARGE MEDICATIONS:  New Prescriptions    AZITHROMYCIN (ZITHROMAX) 250 MG TABLET    2 TABS DAY 1 THEN 1 TAB DAYS 2-5    NICOTINE POLACRILEX (NICORETTE) 2 MG GUM    Take 1 each by mouth as needed for Smoking cessation May use every 1-2 hours not to exceed 24 pieces in 24 hours    PREDNISONE (DELTASONE) 10 MG TABLET    Take 4 tablets by mouth daily for 5 doses          (Please note that portions of this note were completed with a voice recognition program.  Efforts were made to edit the dictations but occasionally words are mis-transcribed.)    Jani Redmond PA-C (electronically signed)  Attending Emergency Physician       Jani Redmond PA-C  01/25/22 0184

## 2022-04-05 ENCOUNTER — APPOINTMENT (OUTPATIENT)
Dept: CT IMAGING | Age: 27
End: 2022-04-05
Payer: MEDICARE

## 2022-04-05 ENCOUNTER — HOSPITAL ENCOUNTER (EMERGENCY)
Age: 27
Discharge: HOME | End: 2022-04-05
Payer: MEDICARE

## 2022-04-05 VITALS
DIASTOLIC BLOOD PRESSURE: 64 MMHG | WEIGHT: 130 LBS | TEMPERATURE: 97.8 F | HEIGHT: 61 IN | OXYGEN SATURATION: 98 % | SYSTOLIC BLOOD PRESSURE: 110 MMHG | RESPIRATION RATE: 18 BRPM | BODY MASS INDEX: 24.55 KG/M2 | HEART RATE: 89 BPM

## 2022-04-05 DIAGNOSIS — R10.84 GENERALIZED ABDOMINAL PAIN: ICD-10-CM

## 2022-04-05 DIAGNOSIS — R19.7 NAUSEA VOMITING AND DIARRHEA: Primary | ICD-10-CM

## 2022-04-05 DIAGNOSIS — R11.2 NAUSEA VOMITING AND DIARRHEA: Primary | ICD-10-CM

## 2022-04-05 LAB
ALBUMIN SERPL-MCNC: 4.7 G/DL (ref 3.5–4.6)
ALP BLD-CCNC: 50 U/L (ref 40–130)
ALT SERPL-CCNC: 11 U/L (ref 0–33)
ANION GAP SERPL CALCULATED.3IONS-SCNC: 11 MEQ/L (ref 9–15)
AST SERPL-CCNC: 15 U/L (ref 0–35)
BACTERIA: NEGATIVE /HPF
BASOPHILS ABSOLUTE: 0 K/UL (ref 0–0.2)
BASOPHILS RELATIVE PERCENT: 0 %
BILIRUB SERPL-MCNC: 0.5 MG/DL (ref 0.2–0.7)
BILIRUBIN URINE: NEGATIVE
BLOOD, URINE: NEGATIVE
BUN BLDV-MCNC: 12 MG/DL (ref 6–20)
CALCIUM SERPL-MCNC: 9.3 MG/DL (ref 8.5–9.9)
CHLORIDE BLD-SCNC: 104 MEQ/L (ref 95–107)
CLARITY: CLEAR
CO2: 25 MEQ/L (ref 20–31)
COLOR: YELLOW
CREAT SERPL-MCNC: 0.53 MG/DL (ref 0.5–0.9)
EOSINOPHILS ABSOLUTE: 0 K/UL (ref 0–0.7)
EOSINOPHILS RELATIVE PERCENT: 0.3 %
EPITHELIAL CELLS, UA: ABNORMAL /HPF (ref 0–5)
GFR AFRICAN AMERICAN: >60
GFR AFRICAN AMERICAN: >60
GFR NON-AFRICAN AMERICAN: >60
GFR NON-AFRICAN AMERICAN: >60
GLOBULIN: 2.6 G/DL (ref 2.3–3.5)
GLUCOSE BLD-MCNC: 104 MG/DL (ref 70–99)
GLUCOSE URINE: NEGATIVE MG/DL
HCG, URINE, POC: NEGATIVE
HCT VFR BLD CALC: 43.3 % (ref 37–47)
HEMOGLOBIN: 14.8 G/DL (ref 12–16)
HYALINE CASTS: ABNORMAL /HPF (ref 0–5)
INFLUENZA A BY PCR: NEGATIVE
INFLUENZA B BY PCR: NEGATIVE
KETONES, URINE: NEGATIVE MG/DL
LEUKOCYTE ESTERASE, URINE: ABNORMAL
LIPASE: 20 U/L (ref 12–95)
LYMPHOCYTES ABSOLUTE: 0.4 K/UL (ref 1–4.8)
LYMPHOCYTES RELATIVE PERCENT: 5.3 %
Lab: NORMAL
MCH RBC QN AUTO: 31.8 PG (ref 27–31.3)
MCHC RBC AUTO-ENTMCNC: 34.1 % (ref 33–37)
MCV RBC AUTO: 93.1 FL (ref 82–100)
MONOCYTES ABSOLUTE: 0.3 K/UL (ref 0.2–0.8)
MONOCYTES RELATIVE PERCENT: 3.2 %
NEGATIVE QC PASS/FAIL: NORMAL
NEUTROPHILS ABSOLUTE: 7.6 K/UL (ref 1.4–6.5)
NEUTROPHILS RELATIVE PERCENT: 91.2 %
NITRITE, URINE: NEGATIVE
PDW BLD-RTO: 13.5 % (ref 11.5–14.5)
PERFORMED ON: ABNORMAL
PH UA: >=9 (ref 5–9)
PLATELET # BLD: 175 K/UL (ref 130–400)
POC CREATININE WHOLE BLOOD: 0.5
POC CREATININE: 0.5 MG/DL (ref 0.6–1.2)
POC SAMPLE TYPE: ABNORMAL
POSITIVE QC PASS/FAIL: NORMAL
POTASSIUM SERPL-SCNC: 4.1 MEQ/L (ref 3.4–4.9)
PROTEIN UA: 30 MG/DL
RBC # BLD: 4.65 M/UL (ref 4.2–5.4)
RBC UA: ABNORMAL /HPF (ref 0–5)
SODIUM BLD-SCNC: 140 MEQ/L (ref 135–144)
SPECIFIC GRAVITY UA: 1.03 (ref 1–1.03)
TOTAL PROTEIN: 7.3 G/DL (ref 6.3–8)
URINE REFLEX TO CULTURE: YES
UROBILINOGEN, URINE: 0.2 E.U./DL
WBC # BLD: 8.3 K/UL (ref 4.8–10.8)
WBC UA: ABNORMAL /HPF (ref 0–5)

## 2022-04-05 PROCEDURE — 80053 COMPREHEN METABOLIC PANEL: CPT

## 2022-04-05 PROCEDURE — 85025 COMPLETE CBC W/AUTO DIFF WBC: CPT

## 2022-04-05 PROCEDURE — 81001 URINALYSIS AUTO W/SCOPE: CPT

## 2022-04-05 PROCEDURE — 96375 TX/PRO/DX INJ NEW DRUG ADDON: CPT

## 2022-04-05 PROCEDURE — 87086 URINE CULTURE/COLONY COUNT: CPT

## 2022-04-05 PROCEDURE — 36415 COLL VENOUS BLD VENIPUNCTURE: CPT

## 2022-04-05 PROCEDURE — 87502 INFLUENZA DNA AMP PROBE: CPT

## 2022-04-05 PROCEDURE — 6360000002 HC RX W HCPCS: Performed by: PHYSICIAN ASSISTANT

## 2022-04-05 PROCEDURE — 96374 THER/PROPH/DIAG INJ IV PUSH: CPT

## 2022-04-05 PROCEDURE — 83690 ASSAY OF LIPASE: CPT

## 2022-04-05 PROCEDURE — 99283 EMERGENCY DEPT VISIT LOW MDM: CPT

## 2022-04-05 RX ORDER — ONDANSETRON 4 MG/1
4-8 TABLET, ORALLY DISINTEGRATING ORAL EVERY 12 HOURS PRN
Qty: 12 TABLET | Refills: 0 | Status: SHIPPED | OUTPATIENT
Start: 2022-04-05 | End: 2022-07-10

## 2022-04-05 RX ORDER — ONDANSETRON 2 MG/ML
4 INJECTION INTRAMUSCULAR; INTRAVENOUS ONCE
Status: COMPLETED | OUTPATIENT
Start: 2022-04-05 | End: 2022-04-05

## 2022-04-05 RX ORDER — SODIUM CHLORIDE 0.9 % (FLUSH) 0.9 %
10 SYRINGE (ML) INJECTION 2 TIMES DAILY
Status: DISCONTINUED | OUTPATIENT
Start: 2022-04-05 | End: 2022-04-05 | Stop reason: HOSPADM

## 2022-04-05 RX ORDER — KETOROLAC TROMETHAMINE 30 MG/ML
30 INJECTION, SOLUTION INTRAMUSCULAR; INTRAVENOUS ONCE
Status: COMPLETED | OUTPATIENT
Start: 2022-04-05 | End: 2022-04-05

## 2022-04-05 RX ADMIN — KETOROLAC TROMETHAMINE 30 MG: 30 INJECTION, SOLUTION INTRAMUSCULAR at 13:19

## 2022-04-05 RX ADMIN — ONDANSETRON 4 MG: 2 INJECTION INTRAMUSCULAR; INTRAVENOUS at 12:44

## 2022-04-05 ASSESSMENT — PAIN DESCRIPTION - LOCATION
LOCATION: ABDOMEN
LOCATION: ABDOMEN

## 2022-04-05 ASSESSMENT — PAIN DESCRIPTION - DESCRIPTORS: DESCRIPTORS: SHARP

## 2022-04-05 ASSESSMENT — PAIN DESCRIPTION - FREQUENCY: FREQUENCY: CONTINUOUS

## 2022-04-05 ASSESSMENT — PAIN SCALES - GENERAL
PAINLEVEL_OUTOF10: 0
PAINLEVEL_OUTOF10: 8
PAINLEVEL_OUTOF10: 10

## 2022-04-05 ASSESSMENT — ENCOUNTER SYMPTOMS
SHORTNESS OF BREATH: 0
EYE PAIN: 0
ABDOMINAL PAIN: 1
RHINORRHEA: 0
BACK PAIN: 0
SORE THROAT: 0
COUGH: 0
DIARRHEA: 1
VOMITING: 1
NAUSEA: 1
PHOTOPHOBIA: 0

## 2022-04-05 ASSESSMENT — PAIN - FUNCTIONAL ASSESSMENT: PAIN_FUNCTIONAL_ASSESSMENT: 0-10

## 2022-04-05 ASSESSMENT — PAIN DESCRIPTION - PAIN TYPE
TYPE: ACUTE PAIN
TYPE: ACUTE PAIN

## 2022-04-05 ASSESSMENT — PAIN DESCRIPTION - ONSET: ONSET: ON-GOING

## 2022-04-05 ASSESSMENT — PAIN DESCRIPTION - ORIENTATION: ORIENTATION: RIGHT

## 2022-04-05 NOTE — ED NOTES
Pt is wanting to leave (pt will sign out AMA).  Pt doesn't want the CT (PA aware)     Mario Mitchell RN  04/05/22 2724

## 2022-04-05 NOTE — ED TRIAGE NOTES
Patient presents to ED c/o vomiting and diarrhea since yesterday. Pt states she thought it was food poisoning but it has not stopped.   Pt c/o RUQ and RLQ abdominal pain  Patient denies fever

## 2022-04-05 NOTE — Clinical Note
Elicia Adamson was seen and treated in our emergency department on 4/5/2022. She may return to work on 04/08/2022. If you have any questions or concerns, please don't hesitate to call.       [place]
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
## 2022-04-07 LAB — URINE CULTURE, ROUTINE: NORMAL

## 2022-07-09 ENCOUNTER — HOSPITAL ENCOUNTER (EMERGENCY)
Age: 27
Discharge: HOME | End: 2022-07-09
Payer: MEDICARE

## 2022-07-09 VITALS
BODY MASS INDEX: 23.92 KG/M2 | WEIGHT: 130 LBS | DIASTOLIC BLOOD PRESSURE: 69 MMHG | OXYGEN SATURATION: 99 % | RESPIRATION RATE: 20 BRPM | TEMPERATURE: 97 F | HEIGHT: 62 IN | SYSTOLIC BLOOD PRESSURE: 103 MMHG | HEART RATE: 75 BPM

## 2022-07-09 DIAGNOSIS — S02.5XXA CLOSED FRACTURE OF TOOTH, INITIAL ENCOUNTER: ICD-10-CM

## 2022-07-09 DIAGNOSIS — K02.9 DENTAL CARIES: ICD-10-CM

## 2022-07-09 DIAGNOSIS — K08.89 DENTALGIA: Primary | ICD-10-CM

## 2022-07-09 PROCEDURE — 6370000000 HC RX 637 (ALT 250 FOR IP): Performed by: PERSONAL EMERGENCY RESPONSE ATTENDANT

## 2022-07-09 PROCEDURE — 99283 EMERGENCY DEPT VISIT LOW MDM: CPT

## 2022-07-09 RX ORDER — PENICILLIN V POTASSIUM 250 MG/1
500 TABLET ORAL ONCE
Status: COMPLETED | OUTPATIENT
Start: 2022-07-09 | End: 2022-07-09

## 2022-07-09 RX ORDER — HYDROCODONE BITARTRATE AND ACETAMINOPHEN 5; 325 MG/1; MG/1
1 TABLET ORAL EVERY 6 HOURS PRN
Qty: 10 TABLET | Refills: 0 | Status: SHIPPED | OUTPATIENT
Start: 2022-07-09 | End: 2022-07-12

## 2022-07-09 RX ORDER — PENICILLIN V POTASSIUM 500 MG/1
500 TABLET ORAL 4 TIMES DAILY
Qty: 28 TABLET | Refills: 0 | Status: SHIPPED | OUTPATIENT
Start: 2022-07-09 | End: 2022-07-16

## 2022-07-09 RX ORDER — HYDROCODONE BITARTRATE AND ACETAMINOPHEN 5; 325 MG/1; MG/1
1 TABLET ORAL ONCE
Status: COMPLETED | OUTPATIENT
Start: 2022-07-09 | End: 2022-07-09

## 2022-07-09 RX ADMIN — HYDROCODONE BITARTRATE AND ACETAMINOPHEN 1 TABLET: 5; 325 TABLET ORAL at 23:30

## 2022-07-09 RX ADMIN — PENICILLIN V POTASSIUM 500 MG: 250 TABLET, FILM COATED ORAL at 23:30

## 2022-07-09 ASSESSMENT — ENCOUNTER SYMPTOMS
ABDOMINAL PAIN: 0
BLOOD IN STOOL: 0
NAUSEA: 0
SHORTNESS OF BREATH: 0
DIARRHEA: 0
SORE THROAT: 0
COUGH: 0
COLOR CHANGE: 0
VOMITING: 0
RHINORRHEA: 0

## 2022-07-09 ASSESSMENT — PAIN DESCRIPTION - DESCRIPTORS
DESCRIPTORS: ACHING
DESCRIPTORS: ACHING;THROBBING

## 2022-07-09 ASSESSMENT — PAIN DESCRIPTION - ORIENTATION
ORIENTATION: UPPER
ORIENTATION: LEFT

## 2022-07-09 ASSESSMENT — PAIN DESCRIPTION - FREQUENCY: FREQUENCY: CONTINUOUS

## 2022-07-09 ASSESSMENT — PAIN DESCRIPTION - LOCATION
LOCATION: MOUTH
LOCATION: MOUTH

## 2022-07-09 ASSESSMENT — PAIN SCALES - GENERAL
PAINLEVEL_OUTOF10: 10
PAINLEVEL_OUTOF10: 10

## 2022-07-09 ASSESSMENT — PAIN DESCRIPTION - PAIN TYPE: TYPE: ACUTE PAIN

## 2022-07-09 ASSESSMENT — PAIN - FUNCTIONAL ASSESSMENT: PAIN_FUNCTIONAL_ASSESSMENT: 0-10

## 2022-07-10 ENCOUNTER — HOSPITAL ENCOUNTER (EMERGENCY)
Age: 27
Discharge: HOME | End: 2022-07-10
Payer: MEDICARE

## 2022-07-10 VITALS
WEIGHT: 130 LBS | SYSTOLIC BLOOD PRESSURE: 107 MMHG | DIASTOLIC BLOOD PRESSURE: 65 MMHG | BODY MASS INDEX: 23.92 KG/M2 | HEIGHT: 62 IN | RESPIRATION RATE: 20 BRPM | HEART RATE: 88 BPM | TEMPERATURE: 97.8 F | OXYGEN SATURATION: 98 %

## 2022-07-10 DIAGNOSIS — K08.89 DENTALGIA: Primary | ICD-10-CM

## 2022-07-10 DIAGNOSIS — R11.2 NON-INTRACTABLE VOMITING WITH NAUSEA, UNSPECIFIED VOMITING TYPE: ICD-10-CM

## 2022-07-10 LAB
HCG, URINE, POC: NEGATIVE
Lab: NORMAL
NEGATIVE QC PASS/FAIL: NORMAL
POSITIVE QC PASS/FAIL: NORMAL

## 2022-07-10 PROCEDURE — 6360000002 HC RX W HCPCS: Performed by: PHYSICIAN ASSISTANT

## 2022-07-10 PROCEDURE — 6370000000 HC RX 637 (ALT 250 FOR IP): Performed by: PHYSICIAN ASSISTANT

## 2022-07-10 PROCEDURE — 2500000003 HC RX 250 WO HCPCS: Performed by: PHYSICIAN ASSISTANT

## 2022-07-10 PROCEDURE — 64400 NJX AA&/STRD TRIGEMINAL NRV: CPT

## 2022-07-10 PROCEDURE — 99284 EMERGENCY DEPT VISIT MOD MDM: CPT

## 2022-07-10 PROCEDURE — 96372 THER/PROPH/DIAG INJ SC/IM: CPT

## 2022-07-10 RX ORDER — ONDANSETRON 4 MG/1
4 TABLET, ORALLY DISINTEGRATING ORAL EVERY 8 HOURS PRN
Qty: 20 TABLET | Refills: 0 | Status: SHIPPED | OUTPATIENT
Start: 2022-07-10

## 2022-07-10 RX ORDER — ETODOLAC 400 MG/1
400 TABLET, FILM COATED ORAL 2 TIMES DAILY
Qty: 14 TABLET | Refills: 0 | Status: SHIPPED | OUTPATIENT
Start: 2022-07-10

## 2022-07-10 RX ORDER — MORPHINE SULFATE 4 MG/ML
4 INJECTION, SOLUTION INTRAMUSCULAR; INTRAVENOUS ONCE
Status: COMPLETED | OUTPATIENT
Start: 2022-07-10 | End: 2022-07-10

## 2022-07-10 RX ORDER — BUPIVACAINE HYDROCHLORIDE 5 MG/ML
5 INJECTION, SOLUTION EPIDURAL; INTRACAUDAL ONCE
Status: COMPLETED | OUTPATIENT
Start: 2022-07-10 | End: 2022-07-10

## 2022-07-10 RX ORDER — ONDANSETRON 4 MG/1
4 TABLET, ORALLY DISINTEGRATING ORAL ONCE
Status: COMPLETED | OUTPATIENT
Start: 2022-07-10 | End: 2022-07-10

## 2022-07-10 RX ADMIN — MORPHINE SULFATE 4 MG: 4 INJECTION, SOLUTION INTRAMUSCULAR; INTRAVENOUS at 20:40

## 2022-07-10 RX ADMIN — ONDANSETRON 4 MG: 4 TABLET, ORALLY DISINTEGRATING ORAL at 20:40

## 2022-07-10 RX ADMIN — BUPIVACAINE HYDROCHLORIDE 25 MG: 5 INJECTION, SOLUTION EPIDURAL; INTRACAUDAL at 20:43

## 2022-07-10 ASSESSMENT — PAIN DESCRIPTION - ONSET: ONSET: ON-GOING

## 2022-07-10 ASSESSMENT — ENCOUNTER SYMPTOMS
VOMITING: 1
TROUBLE SWALLOWING: 0
ALLERGIC/IMMUNOLOGIC NEGATIVE: 1
ABDOMINAL PAIN: 0
EYE PAIN: 0
NAUSEA: 1
SHORTNESS OF BREATH: 0
COLOR CHANGE: 0
APNEA: 0

## 2022-07-10 ASSESSMENT — PAIN SCALES - GENERAL
PAINLEVEL_OUTOF10: 10
PAINLEVEL_OUTOF10: 10

## 2022-07-10 ASSESSMENT — PAIN DESCRIPTION - PAIN TYPE: TYPE: ACUTE PAIN

## 2022-07-10 ASSESSMENT — PAIN DESCRIPTION - ORIENTATION: ORIENTATION: LEFT

## 2022-07-10 ASSESSMENT — PAIN - FUNCTIONAL ASSESSMENT: PAIN_FUNCTIONAL_ASSESSMENT: 0-10

## 2022-07-10 ASSESSMENT — PAIN DESCRIPTION - FREQUENCY: FREQUENCY: CONTINUOUS

## 2022-07-10 ASSESSMENT — PAIN DESCRIPTION - LOCATION: LOCATION: TEETH

## 2022-07-10 NOTE — ED TRIAGE NOTES
Pt presents to ED from home with c/o dental pain. Pt reports that pain has been present for 3-4 days. Denies seeing dentist.   Reports that IBU for pain w/o relief. Upon assessment, pt is A/Ox4, skin p/w/d, respirations even and unlabored, msp's intact. Pt denies fever, chills.

## 2022-07-10 NOTE — ED NOTES
Pt came in with dental pain located on LT upper part of mouth. Pt reported she was eating taffy at [place] when she felt her tooth break. Site looks black/brown with tooth broken off. Pt is tearful. Pt reported she tried tylenol at home with no relief. Pt was given d/c instructions and medications were discussed. Boyfriend is taking pt home.       Nora Webb RN  07/09/22 4021

## 2022-07-10 NOTE — ED PROVIDER NOTES
SULFATE HFA (PROAIR HFA) 108 (90 BASE) MCG/ACT INHALER    1-2 puffs, Use every 4 hours while awake for PRN wheezing  Dispense with SPACER and Instruct on use. May sub Ventolin or Proventil as needed per [employer]. ETODOLAC (LODINE) 400 MG TABLET    Take 1 tablet by mouth 2 times daily    FAMOTIDINE (PEPCID) 20 MG TABLET    Take 1 tablet by mouth 2 times daily    NICOTINE POLACRILEX (NICORETTE) 2 MG GUM    Take 1 each by mouth as needed for Smoking cessation May use every 1-2 hours not to exceed 24 pieces in 24 hours    ONDANSETRON (ZOFRAN ODT) 4 MG DISINTEGRATING TABLET    Take 1-2 tablets by mouth every 12 hours as needed for Nausea May Sub regular tablet (non-ODT) if insurance does not cover ODT. QUETIAPINE (SEROQUEL) 25 MG TABLET    Take 1 tablet by mouth 2 times daily       ALLERGIES     Patient has no known allergies.     FAMILY HISTORY       Family History   Problem Relation Age of Onset    Substance Abuse Father     Kidney Disease Father     Liver Disease Father           SOCIAL HISTORY       Social History     Socioeconomic History    Marital status: Single     Spouse name: None    Number of children: None    Years of education: None    Highest education level: None   Occupational History    None   Tobacco Use    Smoking status: Current Every Day Smoker     Packs/day: 2.00     Years: 7.00     Pack years: 14.00     Types: Cigarettes    Smokeless tobacco: Never Used   Vaping Use    Vaping Use: Former    Substances: Flavoring    Devices: Disposable   Substance and Sexual Activity    Alcohol use: Yes     Comment: special occasions    Drug use: Not Currently     Types: Marijuana Champ Cue)     Comment: Last smoked Aug 2021    Sexual activity: Yes     Partners: Male   Other Topics Concern    None   Social History Narrative    None     Social Determinants of Health     Financial Resource Strain:     Difficulty of Paying Living Expenses: Not on file   Food Insecurity:     Worried About Running Out of Food in the Last Year: Not on file    Ran Out of Food in the Last Year: Not on file   Transportation Needs:     Lack of Transportation (Medical): Not on file    Lack of Transportation (Non-Medical): Not on file   Physical Activity:     Days of Exercise per Week: Not on file    Minutes of Exercise per Session: Not on file   Stress:     Feeling of Stress : Not on file   Social Connections:     Frequency of Communication with Friends and Family: Not on file    Frequency of Social Gatherings with Friends and Family: Not on file    Attends Bahai Services: Not on file    Active Member of [address] [place] [address] [employer] or Organizations: Not on file    Attends Club or Organization Meetings: Not on file    Marital Status: Not on file   Intimate Partner Violence:     Fear of Current or Ex-Partner: Not on file    Emotionally Abused: Not on file    Physically Abused: Not on file    Sexually Abused: Not on file   Housing Stability:     Unable to Pay for Housing in the Last Year: Not on file    Number of [place] in the Last Year: Not on file    Unstable Housing in the Last Year: Not on file         PHYSICAL EXAM         ED Triage Vitals   BP Temp Temp Source Heart Rate Resp SpO2 Height Weight   07/09/22 2249 07/09/22 2248 07/09/22 2248 07/09/22 2248 07/09/22 2248 07/09/22 2248 07/09/22 2248 07/09/22 2248   103/69 97 °F (36.1 °C) Temporal 75 20 99 % 5' 2\" (1.575 m) 130 lb (59 kg)       Physical Exam  Constitutional:       Appearance: She is well-developed. HENT:      Head: Normocephalic and atraumatic. Mouth/Throat:      Comments: Patient has poor dentition throughout with tooth decay and fractures and posterior aspects of bilateral upper and lower teeth with moderate tenderness palpation left upper and lower posterior parts. No airway compromise, no abscess, no gingivitis. Eyes:      Conjunctiva/sclera: Conjunctivae normal.      Pupils: Pupils are equal, round, and reactive to light.    Neck:      Trachea: No tracheal deviation. Cardiovascular:      Heart sounds: Normal heart sounds. Pulmonary:      Effort: Pulmonary effort is normal. No respiratory distress. Breath sounds: Normal breath sounds. No stridor. Abdominal:      General: Bowel sounds are normal. There is no distension. Palpations: Abdomen is soft. There is no mass. Tenderness: There is no abdominal tenderness. There is no guarding or rebound. Musculoskeletal:         General: Normal range of motion. Cervical back: Normal range of motion and neck supple. Skin:     General: Skin is warm and dry. Capillary Refill: Capillary refill takes less than 2 seconds. Findings: No rash. Neurological:      Mental Status: She is alert and oriented to person, place, and time. Deep Tendon Reflexes: Reflexes are normal and symmetric. Psychiatric:         Behavior: Behavior normal.         Thought Content: Thought content normal.         Judgment: Judgment normal.         DIAGNOSTIC RESULTS     EKG:All EKG's are interpreted by the Emergency Department Physician who either signs or Co-signs this chart in the absence of a cardiologist.        RADIOLOGY:   Non-plain film images such as CT, Ultrasound and MRI are read by theradiologist. Plain radiographic images are visualized and preliminarily interpreted by the emergency physician with the below findings:    Interpretation per theRadiologist below, if available at the time of this note:    No orders to display           LABS:  Labs Reviewed - No data to display    All other labs were within normal range or not returned as of this dictation.     EMERGENCY DEPARTMENT COURSE and DIFFERENTIAL DIAGNOSIS/MDM:   Vitals:    Vitals:    07/09/22 2248 07/09/22 2249   BP:  103/69   Pulse: 75    Resp: 20    Temp: 97 °F (36.1 °C)    TempSrc: Temporal    SpO2: 99%    Weight: 130 lb (59 kg)    Height: 5' 2\" (1.575 m)          MDM    Patient aware to continue Motrin at home every 6-8 hours, and will be given Norco and penicillin VK. Given multiple dental referrals. Standard anticipatory guidance given to patient upon discharge. Have given them a specific time frame in which to follow-up and who to follow-up with. I have also advised them that they should return to the emergency department if they get worse, or not getting better or develop any new or concerning symptoms. Patient demonstrates understanding. CRITICAL CARE TIME   Total Critical Caretime was 0 minutes, excluding separately reportable procedures. There was a high probability of clinically significant/life threatening deterioration in the patient's condition which required my urgent intervention. Procedures    FINAL IMPRESSION      1. Dentalgia    2. Dental caries    3. Closed fracture of tooth, initial encounter          DISPOSITION/PLAN   DISPOSITION Decision To Discharge 07/09/2022 11:16:33 PM      PATIENT REFERRED TO:  [facility]  [address] [place] [address] [phone]          DISCHARGE MEDICATIONS:  New Prescriptions    HYDROCODONE-ACETAMINOPHEN (NORCO) 5-325 MG PER TABLET    Take 1 tablet by mouth every 6 hours as needed for Pain for up to 3 days. Intended supply: 3 days. Take lowest dose possible to manage pain    PENICILLIN V POTASSIUM (VEETID) 500 MG TABLET    Take 1 tablet by mouth 4 times daily for 7 days          (Please notethat portions of this note were completed with a voice recognition program.  Efforts were made to edit the dictations but occasionally words are mis-transcribed. )    JUANJOSE Green (electronically signed)  Emergency Physician Assistant         Brock Chavarria, [address]  07/09/22 7974

## 2022-07-11 NOTE — ED NOTES
Patient given DC instructions education and scripts  Educated on proper use of medication   Pt to FU with dentist   Up with steady gait at time of DC  Boyfriend driving patient home      Anna Hernandez RN  07/10/22 4759

## 2022-07-11 NOTE — ED PROVIDER NOTES
[address] [facility] ED  eMERGENCYdEPARTMENT eNCOUnter      Pt Name: Thania Khan  MRN: [id number]  [place] 1995  Date of evaluation: 7/10/2022  Provider:Adam Downs PA-C    CHIEF COMPLAINT       Chief Complaint   Patient presents with    Dental Pain    Emesis         HISTORY OF PRESENT ILLNESS  (Location/Symptom, Timing/Onset, Context/Setting, Quality, Duration, Modifying Factors, Severity.)   Thania Khan is a 32 y.o. female who presents to the emergency department with complaints of left maxillary dental pain and associated nausea and vomiting. Patient was seen in the emergency department for the same complaints yesterday and diagnosed with a dental infection. Patient was started on Pen-Vee and Norco and she received her first dose while in the emergency department. Patient states that on the drive home she became nauseated and began to have vomiting. Patient states that she has not been able to keep the meds down due to every time she takes them she vomits. Patient has not been eating due to the pain. Patient denies any diarrhea. No abdominal pain or chest pain. No sore throats or difficulty swallowing    HPI    Nursing Notes were reviewed and I agree. REVIEW OF SYSTEMS    (2-9 systems for level 4, 10 or more for level 5)     Review of Systems   Constitutional: Negative for diaphoresis and fever. HENT: Positive for dental problem. Negative for hearing loss and trouble swallowing. Eyes: Negative for pain. Respiratory: Negative for apnea and shortness of breath. Cardiovascular: Negative for chest pain. Gastrointestinal: Positive for nausea and vomiting. Negative for abdominal pain. Endocrine: Negative. Genitourinary: Negative for hematuria. Musculoskeletal: Negative for neck pain and neck stiffness. Skin: Negative for color change. Allergic/Immunologic: Negative. Neurological: Negative for dizziness and numbness. Hematological: Negative. Psychiatric/Behavioral: Negative. All other systems reviewed and are negative. Except as noted above the remainder of the review of systems was reviewed and negative. PAST MEDICAL HISTORY     Past Medical History:   Diagnosis Date    Acute superficial gastritis without hemorrhage 7/7/2020    Anxiety     Asthma     Cervicalgia 7/7/2020    [facility] to [facility] 11/11/2019    Menorrhagia with regular cycle 7/7/2020    Muscle spasm 7/7/2020    Seizures (HCC)          SURGICAL HISTORY     History reviewed. No pertinent surgical history. CURRENT MEDICATIONS       Previous Medications    ACETAMINOPHEN (AMINOFEN) 325 MG TABLET    Take 2 tablets by mouth every 6 hours as needed for Pain    ALBUTEROL SULFATE HFA (PROAIR HFA) 108 (90 BASE) MCG/ACT INHALER    1-2 puffs, Use every 4 hours while awake for PRN wheezing  Dispense with SPACER and Instruct on use. May sub Ventolin or Proventil as needed per [employer]. FAMOTIDINE (PEPCID) 20 MG TABLET    Take 1 tablet by mouth 2 times daily    HYDROCODONE-ACETAMINOPHEN (NORCO) 5-325 MG PER TABLET    Take 1 tablet by mouth every 6 hours as needed for Pain for up to 3 days. Intended supply: 3 days.  Take lowest dose possible to manage pain    NICOTINE POLACRILEX (NICORETTE) 2 MG GUM    Take 1 each by mouth as needed for Smoking cessation May use every 1-2 hours not to exceed 24 pieces in 24 hours    PENICILLIN V POTASSIUM (VEETID) 500 MG TABLET    Take 1 tablet by mouth 4 times daily for 7 days    QUETIAPINE (SEROQUEL) 25 MG TABLET    Take 1 tablet by mouth 2 times daily       ALLERGIES     Mucinex d [pseudoephedrine-guaifenesin]    FAMILY HISTORY       Family History   Problem Relation Age of Onset    Substance Abuse Father     Kidney Disease Father     Liver Disease Father           SOCIAL HISTORY       Social History     Socioeconomic History    Marital status: Single     Spouse name: None    Number of children: None    Years of education: None    Highest education level: None   Occupational History    None   Tobacco Use    Smoking status: Current Every Day Smoker     Packs/day: 2.00     Years: 7.00     Pack years: 14.00     Types: Cigarettes    Smokeless tobacco: Never Used   Vaping Use    Vaping Use: Former    Substances: Flavoring    Devices: Disposable   Substance and Sexual Activity    Alcohol use: Yes     Comment: special occasions    Drug use: Not Currently     Types: Marijuana Estil Catena)     Comment: Last smoked Aug 2021    Sexual activity: Yes     Partners: Male   Other Topics Concern    None   Social History Narrative    None     Social Determinants of Health     Financial Resource Strain:     Difficulty of Paying Living Expenses: Not on file   Food Insecurity:     Worried About Running Out of Food in the Last Year: Not on file    Brad of Food in the Last Year: Not on file   Transportation Needs:     Lack of Transportation (Medical): Not on file    Lack of Transportation (Non-Medical):  Not on file   Physical Activity:     Days of Exercise per Week: Not on file    Minutes of Exercise per Session: Not on file   Stress:     Feeling of Stress : Not on file   Social Connections:     Frequency of Communication with Friends and Family: Not on file    Frequency of Social Gatherings with Friends and Family: Not on file    Attends Muslim Services: Not on file    Active Member of [address] [place] [address] [employer] or Organizations: Not on file    Attends Club or Organization Meetings: Not on file    Marital Status: Not on file   Intimate Partner Violence:     Fear of Current or Ex-Partner: Not on file    Emotionally Abused: Not on file    Physically Abused: Not on file    Sexually Abused: Not on file   Housing Stability:     Unable to Pay for Housing in the Last Year: Not on file    Number of [place] in the Last Year: Not on file    Unstable Housing in the Last Year: Not on file       SCREENINGS    Amauri Coma Scale  Eye Opening: Spontaneous  Best Verbal Response: Oriented  Best Motor Response: Obeys commands  Amauri Coma Scale Score: 15      PHYSICAL EXAM    (up to 7 forlevel 4, 8 or more for level 5)     ED Triage Vitals [07/10/22 2010]   BP Temp Temp Source Heart Rate Resp SpO2 Height Weight   107/65 97.8 °F (36.6 °C) Oral (!) 119 20 98 % 5' 2\" (1.575 m) 130 lb (59 kg)       Physical Exam  Vitals and nursing note reviewed. Constitutional:       General: She is not in acute distress. Appearance: She is well-developed. She is not diaphoretic. HENT:      Head: Normocephalic and atraumatic. Mouth/Throat:      Dentition: Abnormal dentition. Dental tenderness, gingival swelling and dental caries present. Pharynx: No oropharyngeal exudate. Eyes:      General: No scleral icterus. Conjunctiva/sclera: Conjunctivae normal.      Pupils: Pupils are equal, round, and reactive to light. Neck:      Trachea: No tracheal deviation. Cardiovascular:      Rate and Rhythm: Normal rate. Heart sounds: Normal heart sounds. Pulmonary:      Effort: Pulmonary effort is normal. No respiratory distress. Breath sounds: Normal breath sounds. Abdominal:      General: Bowel sounds are normal. There is no distension. Palpations: Abdomen is soft. Musculoskeletal:         General: Normal range of motion. Cervical back: Normal range of motion and neck supple. Skin:     General: Skin is warm and dry. Findings: No erythema or rash. Neurological:      Mental Status: She is alert and oriented to person, place, and time. Cranial Nerves: No cranial nerve deficit. Motor: No abnormal muscle tone. Psychiatric:         Behavior: Behavior normal.         Thought Content:  Thought content normal.         Judgment: Judgment normal.           DIAGNOSTIC RESULTS     RADIOLOGY:   Non-plain film images such as CT, Ultrasound and MRI are read by the radiologist. Plain radiographic images are visualized and preliminarilyinterpreted by Rosa Adorno PA-C with the below findings:      Interpretation per the Radiologist below, if available at the time of this note:    No orders to display       LABS:  Labs Reviewed   POC PREGNANCY UR-QUAL       All other labs were within normal range or not returnedas of this dictation. EMERGENCYDEPARTMENT COURSE and DIFFERENTIAL DIAGNOSIS/MDM:   Vitals:    Vitals:    07/10/22 2010   BP: 107/65   Pulse: (!) 119   Resp: 20   Temp: 97.8 °F (36.6 °C)   TempSrc: Oral   SpO2: 98%   Weight: 130 lb (59 kg)   Height: 5' 2\" (1.575 m)       REASSESSMENT        Presented with nausea and vomiting associated with medications and persistent dental pain. Nerve block was introduced. Patient was advised to follow-up with dentist tomorrow morning. MDM    PROCEDURES:    Dental Nerve Block    Date/Time: 7/10/2022 8:48 PM  Performed by: Rosa Adorno PA-C  Authorized by: Rosa Adorno PA-C     Consent:     Consent obtained:  Verbal    Consent given by:  Patient    Risks discussed:  Infection, nerve damage, unsuccessful block and pain    Alternatives discussed:  No treatment  Indications:     Indications: dental pain    Procedure details (see MAR for exact dosages):     Syringe type:  Luer lock syringe    Needle gauge:  30 G    Anesthetic injected:  Bupivacaine 0.5% w/o epi    Injection procedure:  Anatomic landmarks identified, introduced needle, incremental injection, anatomic landmarks palpated and negative aspiration for blood  Post-procedure details:     Outcome:  Pain improved    Patient tolerance of procedure: Tolerated well, no immediate complications          FINAL IMPRESSION      1. Dentalgia    2.  Non-intractable vomiting with nausea, unspecified vomiting type          DISPOSITION/PLAN   DISPOSITION Decision To Discharge 07/10/2022 08:50:17 PM      PATIENT REFERRED TO:  [facility]  [address] [facility] [place]  [phone]  Call in 1 day        DISCHARGE MEDICATIONS:  New Prescriptions ETODOLAC (LODINE) 400 MG TABLET    Take 1 tablet by mouth 2 times daily    ONDANSETRON (ZOFRAN ODT) 4 MG DISINTEGRATING TABLET    Take 1 tablet by mouth every 8 hours as needed for Nausea       (Please note that portions of this note were completed with a voice recognition program.  Efforts were made to edit the dictations but occasionally words are mis-transcribed.)    MU Do PA-C  07/10/22 [id number]

## 2022-09-11 ENCOUNTER — HOSPITAL ENCOUNTER (EMERGENCY)
Age: 27
Discharge: HOME | End: 2022-09-11
Payer: MEDICARE

## 2022-09-11 VITALS
HEIGHT: 61 IN | SYSTOLIC BLOOD PRESSURE: 120 MMHG | OXYGEN SATURATION: 99 % | BODY MASS INDEX: 24.92 KG/M2 | TEMPERATURE: 97.9 F | RESPIRATION RATE: 18 BRPM | WEIGHT: 132 LBS | HEART RATE: 77 BPM | DIASTOLIC BLOOD PRESSURE: 87 MMHG

## 2022-09-11 DIAGNOSIS — L02.91 ABSCESS: Primary | ICD-10-CM

## 2022-09-11 PROCEDURE — 99283 EMERGENCY DEPT VISIT LOW MDM: CPT

## 2022-09-11 RX ORDER — HYDROCODONE BITARTRATE AND ACETAMINOPHEN 5; 325 MG/1; MG/1
1 TABLET ORAL EVERY 6 HOURS PRN
Qty: 10 TABLET | Refills: 0 | Status: SHIPPED | OUTPATIENT
Start: 2022-09-11 | End: 2022-09-14

## 2022-09-11 RX ORDER — CEPHALEXIN 500 MG/1
1000 CAPSULE ORAL 2 TIMES DAILY
Qty: 40 CAPSULE | Refills: 0 | Status: SHIPPED | OUTPATIENT
Start: 2022-09-11

## 2022-09-11 RX ORDER — IBUPROFEN 600 MG/1
600 TABLET ORAL EVERY 8 HOURS PRN
Qty: 20 TABLET | Refills: 0 | Status: SHIPPED | OUTPATIENT
Start: 2022-09-11

## 2022-09-11 ASSESSMENT — ENCOUNTER SYMPTOMS
SHORTNESS OF BREATH: 0
ABDOMINAL PAIN: 0
BACK PAIN: 0
COUGH: 0

## 2022-09-11 ASSESSMENT — PAIN DESCRIPTION - ONSET: ONSET: SUDDEN

## 2022-09-11 ASSESSMENT — PAIN DESCRIPTION - FREQUENCY: FREQUENCY: CONTINUOUS

## 2022-09-11 ASSESSMENT — PAIN SCALES - GENERAL
PAINLEVEL_OUTOF10: 8
PAINLEVEL_OUTOF10: 8

## 2022-09-11 ASSESSMENT — PAIN DESCRIPTION - DESCRIPTORS: DESCRIPTORS: ACHING

## 2022-09-11 ASSESSMENT — PAIN DESCRIPTION - LOCATION: LOCATION: GROIN

## 2022-09-11 ASSESSMENT — PAIN - FUNCTIONAL ASSESSMENT: PAIN_FUNCTIONAL_ASSESSMENT: 0-10

## 2022-09-11 ASSESSMENT — PAIN DESCRIPTION - PAIN TYPE: TYPE: ACUTE PAIN

## 2022-09-11 NOTE — ED PROVIDER NOTES
[address] [facility] ED  eMERGENCY dEPARTMENT eNCOUnter      Pt Name: Nic Roach  MRN: [id number]  [place] 1995  Date of evaluation: 9/11/2022  Provider: COLLINS Rawls CNP      HISTORY OF PRESENT ILLNESS    Nic Roach is a 32 y.o. female who presents to the Emergency Department with R thigh abscess x 3 days. Patient states it is getting bigger and is very painful. Denies fever, chills. REVIEW OF SYSTEMS       Review of Systems   Constitutional:  Negative for fever. HENT:  Negative for congestion. Respiratory:  Negative for cough and shortness of breath. Cardiovascular:  Negative for chest pain. Gastrointestinal:  Negative for abdominal pain. Genitourinary:  Negative for dysuria. Musculoskeletal:  Negative for arthralgias and back pain. Skin:  Negative for rash. R thigh abscess     All other systems reviewed and are negative. PAST MEDICAL HISTORY     Past Medical History:   Diagnosis Date    Acute superficial gastritis without hemorrhage 7/7/2020    Anxiety     Asthma     Cervicalgia 7/7/2020    Related to [facility] 11/11/2019    Menorrhagia with regular cycle 7/7/2020    Muscle spasm 7/7/2020    Seizures ([facility] [address]          SURGICAL HISTORY     No past surgical history on file. CURRENT MEDICATIONS       Previous Medications    ACETAMINOPHEN (AMINOFEN) 325 MG TABLET    Take 2 tablets by mouth every 6 hours as needed for Pain    ALBUTEROL SULFATE HFA (PROAIR HFA) 108 (90 BASE) MCG/ACT INHALER    1-2 puffs, Use every 4 hours while awake for PRN wheezing  Dispense with SPACER and Instruct on use. May sub Ventolin or Proventil as needed per [employer].     ETODOLAC (LODINE) 400 MG TABLET    Take 1 tablet by mouth 2 times daily    FAMOTIDINE (PEPCID) 20 MG TABLET    Take 1 tablet by mouth 2 times daily    NICOTINE POLACRILEX (NICORETTE) 2 MG GUM    Take 1 each by mouth as needed for Smoking cessation May use every 1-2 hours not to exceed 24 pieces in 24 hours ONDANSETRON (ZOFRAN ODT) 4 MG DISINTEGRATING TABLET    Take 1 tablet by mouth every 8 hours as needed for Nausea    QUETIAPINE (SEROQUEL) 25 MG TABLET    Take 1 tablet by mouth 2 times daily       ALLERGIES     Mucinex d [pseudoephedrine-guaifenesin]    FAMILY HISTORY       Family History   Problem Relation Age of Onset    Substance Abuse Father     Kidney Disease Father     Liver Disease Father           SOCIAL HISTORY       Social History     Socioeconomic History    Marital status: Single   Tobacco Use    Smoking status: Every Day     Packs/day: 2.00     Years: 7.00     Pack years: 14.00     Types: Cigarettes    Smokeless tobacco: Never   Vaping Use    Vaping Use: Former    Substances: Flavoring    Devices: Disposable   Substance and Sexual Activity    Alcohol use: Yes     Comment: special occasions    Drug use: Not Currently     Types: Marijuana Flavia Monique)     Comment: Last smoked Aug 2021    Sexual activity: Yes     Partners: Male       SCREENINGS    Amauri Coma Scale  Eye Opening: Spontaneous  Best Verbal Response: None  Best Motor Response: Obeys commands  Mays Coma Scale Score: 15 @FLOW([id number])@      PHYSICAL EXAM    (up to 7 for level 4, 8 or more for level 5)     ED Triage Vitals [09/11/22 1715]   BP Temp Temp Source Heart Rate Resp SpO2 Height Weight   118/75 97.9 °F (36.6 °C) Oral 74 16 100 % 5' 1\" (1.549 m) 132 lb (59.9 kg)       Physical Exam  Vitals and nursing note reviewed. Constitutional:       Appearance: She is well-developed. HENT:      Head: Normocephalic and atraumatic. Right Ear: Hearing, tympanic membrane, ear canal and external ear normal.      Left Ear: Hearing, tympanic membrane, ear canal and external ear normal.      Nose: Nose normal.      Mouth/Throat:      Lips: Pink. Mouth: Mucous membranes are moist.   Eyes:      Conjunctiva/sclera: Conjunctivae normal.      Pupils: Pupils are equal, round, and reactive to light.    Cardiovascular:      Rate and Rhythm: Normal rate and regular rhythm. Heart sounds: Normal heart sounds. Pulmonary:      Effort: Pulmonary effort is normal. No accessory muscle usage or respiratory distress. Breath sounds: Normal breath sounds. No decreased air movement. No decreased breath sounds, wheezing or rhonchi. Abdominal:      General: Bowel sounds are normal. There is no distension. Palpations: Abdomen is soft. Tenderness: There is no abdominal tenderness. Genitourinary:      Musculoskeletal:         General: Normal range of motion. Cervical back: Normal range of motion and neck supple. Skin:     General: Skin is warm and dry. Neurological:      General: No focal deficit present. Mental Status: She is alert and oriented to person, place, and time. GCS: GCS eye subscore is 4. GCS verbal subscore is 5. GCS motor subscore is 6. Deep Tendon Reflexes: Reflexes are normal and symmetric. Psychiatric:         Judgment: Judgment normal.         All other labs were within normal range or not returned as of this dictation. EMERGENCY DEPARTMENT COURSE and DIFFERENTIALDIAGNOSIS/MDM:   Vitals:    Vitals:    09/11/22 1715   BP: 118/75   Pulse: 74   Resp: 16   Temp: 97.9 °F (36.6 °C)   TempSrc: Oral   SpO2: 100%   Weight: 132 lb (59.9 kg)   Height: 5' 1\" (1.549 m)            32 yr old female with abscess. Rx for Keflex, Norco and Naprosyn was given to the patient. F/ U with PCP in 3 days. Patient verbalizes understanding. PROCEDURES:  Unless otherwise noted below, none     Procedures      FINAL IMPRESSION      1.  Abscess          DISPOSITION/PLAN   DISPOSITION Decision To Discharge 09/11/2022 06:02:03 PM          COLLINS Marie CNP (electronically signed)  Attending Emergency Physician      COLLINS Marie CNP  09/11/22 4651

## 2022-09-11 NOTE — ED TRIAGE NOTES
Pt arrived to triage via private vehicle. Pt c/o abscess to right side buttock. Pt states she had the same problem last year. Pt states its been going on for a few days now.

## 2022-09-11 NOTE — Clinical Note
Nahum Gaviria was seen and treated in our emergency department on 9/11/2022. She may return to work on 09/14/2022. If you have any questions or concerns, please don't hesitate to call.       Virginia Larkin, COLLINS - CNP

## 2022-09-11 NOTE — ED NOTES
abccess located in the ronel area. Pt states that she had one there last year. Pt states that she does shave the area. Uses different razors to shave the different areas.      Mariza Fall RN  09/11/22 8294

## 2023-01-21 ENCOUNTER — APPOINTMENT (OUTPATIENT)
Dept: CT IMAGING | Age: 28
End: 2023-01-21
Payer: MEDICARE

## 2023-01-21 ENCOUNTER — HOSPITAL ENCOUNTER (EMERGENCY)
Age: 28
Discharge: HOME | End: 2023-01-21
Attending: FAMILY MEDICINE
Payer: MEDICARE

## 2023-01-21 VITALS
HEART RATE: 67 BPM | OXYGEN SATURATION: 99 % | DIASTOLIC BLOOD PRESSURE: 71 MMHG | SYSTOLIC BLOOD PRESSURE: 106 MMHG | RESPIRATION RATE: 16 BRPM | BODY MASS INDEX: 26.43 KG/M2 | WEIGHT: 140 LBS | TEMPERATURE: 97.9 F | HEIGHT: 61 IN

## 2023-01-21 DIAGNOSIS — K61.0 PERIANAL ABSCESS: Primary | ICD-10-CM

## 2023-01-21 LAB
ALBUMIN SERPL-MCNC: 4.1 G/DL (ref 3.5–4.6)
ALP BLD-CCNC: 56 U/L (ref 40–130)
ALT SERPL-CCNC: 11 U/L (ref 0–33)
ANION GAP SERPL CALCULATED.3IONS-SCNC: 12 MEQ/L (ref 9–15)
AST SERPL-CCNC: 12 U/L (ref 0–35)
BASOPHILS ABSOLUTE: 0 K/UL (ref 0–0.2)
BASOPHILS RELATIVE PERCENT: 0.4 %
BILIRUB SERPL-MCNC: 0.6 MG/DL (ref 0.2–0.7)
BUN BLDV-MCNC: 7 MG/DL (ref 6–20)
CALCIUM SERPL-MCNC: 8.7 MG/DL (ref 8.5–9.9)
CHLORIDE BLD-SCNC: 105 MEQ/L (ref 95–107)
CO2: 23 MEQ/L (ref 20–31)
CREAT SERPL-MCNC: 0.5 MG/DL (ref 0.5–0.9)
EOSINOPHILS ABSOLUTE: 0.1 K/UL (ref 0–0.7)
EOSINOPHILS RELATIVE PERCENT: 1.2 %
GFR SERPL CREATININE-BSD FRML MDRD: >60 ML/MIN/{1.73_M2}
GFR SERPL CREATININE-BSD FRML MDRD: >60 ML/MIN/{1.73_M2}
GLOBULIN: 2.5 G/DL (ref 2.3–3.5)
GLUCOSE BLD-MCNC: 81 MG/DL (ref 70–99)
HCT VFR BLD CALC: 39.7 % (ref 37–47)
HEMOGLOBIN: 13.4 G/DL (ref 12–16)
LYMPHOCYTES ABSOLUTE: 2 K/UL (ref 1–4.8)
LYMPHOCYTES RELATIVE PERCENT: 21.9 %
MCH RBC QN AUTO: 31.6 PG (ref 27–31.3)
MCHC RBC AUTO-ENTMCNC: 33.9 % (ref 33–37)
MCV RBC AUTO: 93.2 FL (ref 79.4–94.8)
MONOCYTES ABSOLUTE: 0.5 K/UL (ref 0.2–0.8)
MONOCYTES RELATIVE PERCENT: 5.8 %
NEUTROPHILS ABSOLUTE: 6.4 K/UL (ref 1.4–6.5)
NEUTROPHILS RELATIVE PERCENT: 70.7 %
PDW BLD-RTO: 13.4 % (ref 11.5–14.5)
PERFORMED ON: ABNORMAL
PLATELET # BLD: 206 K/UL (ref 130–400)
POC CREATININE: 0.5 MG/DL (ref 0.6–1.2)
POC SAMPLE TYPE: ABNORMAL
POTASSIUM REFLEX MAGNESIUM: 3.8 MEQ/L (ref 3.4–4.9)
RBC # BLD: 4.26 M/UL (ref 4.2–5.4)
SODIUM BLD-SCNC: 140 MEQ/L (ref 135–144)
TOTAL PROTEIN: 6.6 G/DL (ref 6.3–8)
WBC # BLD: 9 K/UL (ref 4.8–10.8)

## 2023-01-21 PROCEDURE — 6360000004 HC RX CONTRAST MEDICATION: Performed by: FAMILY MEDICINE

## 2023-01-21 PROCEDURE — 6360000002 HC RX W HCPCS: Performed by: FAMILY MEDICINE

## 2023-01-21 PROCEDURE — 96365 THER/PROPH/DIAG IV INF INIT: CPT

## 2023-01-21 PROCEDURE — 36415 COLL VENOUS BLD VENIPUNCTURE: CPT

## 2023-01-21 PROCEDURE — 2580000003 HC RX 258: Performed by: FAMILY MEDICINE

## 2023-01-21 PROCEDURE — 96375 TX/PRO/DX INJ NEW DRUG ADDON: CPT

## 2023-01-21 PROCEDURE — 99285 EMERGENCY DEPT VISIT HI MDM: CPT

## 2023-01-21 PROCEDURE — 10060 I&D ABSCESS SIMPLE/SINGLE: CPT | Performed by: SURGERY

## 2023-01-21 PROCEDURE — 85025 COMPLETE CBC W/AUTO DIFF WBC: CPT

## 2023-01-21 PROCEDURE — 74177 CT ABD & PELVIS W/CONTRAST: CPT

## 2023-01-21 PROCEDURE — 2500000003 HC RX 250 WO HCPCS: Performed by: FAMILY MEDICINE

## 2023-01-21 PROCEDURE — 80053 COMPREHEN METABOLIC PANEL: CPT

## 2023-01-21 RX ORDER — KETOROLAC TROMETHAMINE 30 MG/ML
30 INJECTION, SOLUTION INTRAMUSCULAR; INTRAVENOUS ONCE
Status: COMPLETED | OUTPATIENT
Start: 2023-01-21 | End: 2023-01-21

## 2023-01-21 RX ORDER — LIDOCAINE HYDROCHLORIDE 20 MG/ML
5 INJECTION, SOLUTION INFILTRATION; PERINEURAL ONCE
Status: COMPLETED | OUTPATIENT
Start: 2023-01-21 | End: 2023-01-21

## 2023-01-21 RX ORDER — SULFAMETHOXAZOLE AND TRIMETHOPRIM 800; 160 MG/1; MG/1
1 TABLET ORAL 2 TIMES DAILY
Qty: 20 TABLET | Refills: 0 | Status: SHIPPED | OUTPATIENT
Start: 2023-01-21 | End: 2023-01-31

## 2023-01-21 RX ADMIN — LIDOCAINE HYDROCHLORIDE 5 ML: 20 INJECTION, SOLUTION INFILTRATION; PERINEURAL at 18:15

## 2023-01-21 RX ADMIN — PIPERACILLIN AND TAZOBACTAM 3375 MG: 3; .375 INJECTION, POWDER, FOR SOLUTION INTRAVENOUS at 16:44

## 2023-01-21 RX ADMIN — KETOROLAC TROMETHAMINE 30 MG: 30 INJECTION, SOLUTION INTRAMUSCULAR at 13:36

## 2023-01-21 RX ADMIN — IOPAMIDOL 50 ML: 612 INJECTION, SOLUTION INTRAVENOUS at 13:48

## 2023-01-21 ASSESSMENT — PAIN DESCRIPTION - PAIN TYPE: TYPE: ACUTE PAIN

## 2023-01-21 ASSESSMENT — PAIN DESCRIPTION - LOCATION
LOCATION: BUTTOCKS
LOCATION: RECTUM
LOCATION: BUTTOCKS

## 2023-01-21 ASSESSMENT — PAIN DESCRIPTION - ONSET
ONSET: ON-GOING
ONSET: ON-GOING

## 2023-01-21 ASSESSMENT — PAIN - FUNCTIONAL ASSESSMENT
PAIN_FUNCTIONAL_ASSESSMENT: 0-10

## 2023-01-21 ASSESSMENT — LIFESTYLE VARIABLES
HOW MANY STANDARD DRINKS CONTAINING ALCOHOL DO YOU HAVE ON A TYPICAL DAY: PATIENT DOES NOT DRINK
HOW OFTEN DO YOU HAVE A DRINK CONTAINING ALCOHOL: NEVER

## 2023-01-21 ASSESSMENT — PAIN SCALES - GENERAL
PAINLEVEL_OUTOF10: 3
PAINLEVEL_OUTOF10: 10

## 2023-01-21 ASSESSMENT — PAIN DESCRIPTION - ORIENTATION
ORIENTATION: RIGHT
ORIENTATION: RIGHT

## 2023-01-21 ASSESSMENT — PAIN DESCRIPTION - FREQUENCY
FREQUENCY: CONTINUOUS

## 2023-01-21 ASSESSMENT — PAIN DESCRIPTION - DESCRIPTORS
DESCRIPTORS: ACHING

## 2023-01-21 NOTE — PROCEDURES
Consent signed for in-office incision and drainge of perianal abscess after R/B/A discussed. Procedure was performed without complication. Local anesthesia was given then a 2 cmcm incision made in the right side of the perianal area over the mass with 10 cc amount of purulant fluid drained. Cultures obtained. It was irrigated with betadine and  packed with betadine soaked \" nu-gauze. Instructed patient to watch for signs/ symptoms of continued/ worsening infection. Patient tolerated procedure well  Continue Abx  Local wound care:    Follow up in clinic in 1 week, sooner if need be

## 2023-01-21 NOTE — ED NOTES
A dressing was applied to the wound and the pt was given instructions for care at home. Pt states she understands how to take care of her wound and to F/U with the surgeon if needed.      Alvarez Jasso RN  01/21/23 1357 in 24 hrs/Yes

## 2023-01-22 ASSESSMENT — ENCOUNTER SYMPTOMS: RESPIRATORY NEGATIVE: 1
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
## 2024-08-05 ENCOUNTER — APPOINTMENT (OUTPATIENT)
Dept: RADIOLOGY | Facility: HOSPITAL | Age: 29
End: 2024-08-05
Payer: MEDICAID

## 2024-08-05 ENCOUNTER — HOSPITAL ENCOUNTER (INPATIENT)
Facility: HOSPITAL | Age: 29
End: 2024-08-05
Attending: EMERGENCY MEDICINE | Admitting: INTERNAL MEDICINE
Payer: MEDICAID

## 2024-08-05 ENCOUNTER — APPOINTMENT (OUTPATIENT)
Dept: CARDIOLOGY | Facility: HOSPITAL | Age: 29
End: 2024-08-05
Payer: MEDICAID

## 2024-08-05 DIAGNOSIS — K29.01 OTHER ACUTE GASTRITIS WITH HEMORRHAGE: ICD-10-CM

## 2024-08-05 DIAGNOSIS — K26.9 DUODENAL ULCER: ICD-10-CM

## 2024-08-05 DIAGNOSIS — K92.2 GI BLEEDING: Primary | ICD-10-CM

## 2024-08-05 DIAGNOSIS — K63.1: ICD-10-CM

## 2024-08-05 DIAGNOSIS — M79.89 PAIN AND SWELLING OF UPPER EXTREMITY, RIGHT: ICD-10-CM

## 2024-08-05 DIAGNOSIS — M79.601 PAIN AND SWELLING OF UPPER EXTREMITY, RIGHT: ICD-10-CM

## 2024-08-05 LAB
ALBUMIN SERPL BCP-MCNC: 3.4 G/DL (ref 3.4–5)
ALP SERPL-CCNC: 45 U/L (ref 33–110)
ALT SERPL W P-5'-P-CCNC: 15 U/L (ref 7–45)
AMPHETAMINES UR QL SCN: ABNORMAL
AMYLASE SERPL-CCNC: 25 U/L (ref 29–103)
ANION GAP SERPL CALC-SCNC: 13 MMOL/L (ref 10–20)
APAP SERPL-MCNC: 10.9 UG/ML
APPEARANCE UR: CLEAR
AST SERPL W P-5'-P-CCNC: 29 U/L (ref 9–39)
BARBITURATES UR QL SCN: ABNORMAL
BASOPHILS # BLD AUTO: 0.03 X10*3/UL (ref 0–0.1)
BASOPHILS NFR BLD AUTO: 0.2 %
BENZODIAZ UR QL SCN: ABNORMAL
BILIRUB SERPL-MCNC: 0.2 MG/DL (ref 0–1.2)
BILIRUB UR STRIP.AUTO-MCNC: NEGATIVE MG/DL
BUN SERPL-MCNC: 30 MG/DL (ref 6–23)
BZE UR QL SCN: ABNORMAL
CALCIUM SERPL-MCNC: 7.9 MG/DL (ref 8.6–10.3)
CANNABINOIDS UR QL SCN: ABNORMAL
CHLORIDE SERPL-SCNC: 100 MMOL/L (ref 98–107)
CO2 SERPL-SCNC: 24 MMOL/L (ref 21–32)
COLOR UR: COLORLESS
CREAT SERPL-MCNC: 0.64 MG/DL (ref 0.5–1.05)
EGFRCR SERPLBLD CKD-EPI 2021: >90 ML/MIN/1.73M*2
EOSINOPHIL # BLD AUTO: 0.01 X10*3/UL (ref 0–0.7)
EOSINOPHIL NFR BLD AUTO: 0.1 %
ERYTHROCYTE [DISTWIDTH] IN BLOOD BY AUTOMATED COUNT: 14 % (ref 11.5–14.5)
ETHANOL SERPL-MCNC: <10 MG/DL
FENTANYL+NORFENTANYL UR QL SCN: ABNORMAL
GLUCOSE SERPL-MCNC: 93 MG/DL (ref 74–99)
GLUCOSE UR STRIP.AUTO-MCNC: NORMAL MG/DL
HCG UR QL IA.RAPID: NEGATIVE
HCT VFR BLD AUTO: 24.2 % (ref 36–46)
HGB BLD-MCNC: 8.1 G/DL (ref 12–16)
IMM GRANULOCYTES # BLD AUTO: 0.06 X10*3/UL (ref 0–0.7)
IMM GRANULOCYTES NFR BLD AUTO: 0.4 % (ref 0–0.9)
KETONES UR STRIP.AUTO-MCNC: NEGATIVE MG/DL
LACTATE SERPL-SCNC: 1.3 MMOL/L (ref 0.4–2)
LEUKOCYTE ESTERASE UR QL STRIP.AUTO: NEGATIVE
LIPASE SERPL-CCNC: 49 U/L (ref 9–82)
LYMPHOCYTES # BLD AUTO: 3.12 X10*3/UL (ref 1.2–4.8)
LYMPHOCYTES NFR BLD AUTO: 21.5 %
MCH RBC QN AUTO: 30.3 PG (ref 26–34)
MCHC RBC AUTO-ENTMCNC: 33.5 G/DL (ref 32–36)
MCV RBC AUTO: 91 FL (ref 80–100)
METHADONE UR QL SCN: ABNORMAL
MONOCYTES # BLD AUTO: 0.85 X10*3/UL (ref 0.1–1)
MONOCYTES NFR BLD AUTO: 5.9 %
NEUTROPHILS # BLD AUTO: 10.41 X10*3/UL (ref 1.2–7.7)
NEUTROPHILS NFR BLD AUTO: 71.9 %
NITRITE UR QL STRIP.AUTO: NEGATIVE
NRBC BLD-RTO: 0 /100 WBCS (ref 0–0)
OPIATES UR QL SCN: ABNORMAL
OXYCODONE+OXYMORPHONE UR QL SCN: ABNORMAL
PCP UR QL SCN: ABNORMAL
PH UR STRIP.AUTO: 5.5 [PH]
PLATELET # BLD AUTO: 387 X10*3/UL (ref 150–450)
POTASSIUM SERPL-SCNC: 3.5 MMOL/L (ref 3.5–5.3)
PROT SERPL-MCNC: 5.6 G/DL (ref 6.4–8.2)
PROT UR STRIP.AUTO-MCNC: NEGATIVE MG/DL
RBC # BLD AUTO: 2.67 X10*6/UL (ref 4–5.2)
RBC # UR STRIP.AUTO: NEGATIVE /UL
SALICYLATES SERPL-MCNC: <3 MG/DL
SODIUM SERPL-SCNC: 133 MMOL/L (ref 136–145)
SP GR UR STRIP.AUTO: 1.01
UROBILINOGEN UR STRIP.AUTO-MCNC: NORMAL MG/DL
WBC # BLD AUTO: 14.5 X10*3/UL (ref 4.4–11.3)

## 2024-08-05 PROCEDURE — 81025 URINE PREGNANCY TEST: CPT | Performed by: EMERGENCY MEDICINE

## 2024-08-05 PROCEDURE — 74174 CTA ABD&PLVS W/CONTRAST: CPT | Performed by: STUDENT IN AN ORGANIZED HEALTH CARE EDUCATION/TRAINING PROGRAM

## 2024-08-05 PROCEDURE — 80143 DRUG ASSAY ACETAMINOPHEN: CPT | Performed by: EMERGENCY MEDICINE

## 2024-08-05 PROCEDURE — 71045 X-RAY EXAM CHEST 1 VIEW: CPT | Performed by: STUDENT IN AN ORGANIZED HEALTH CARE EDUCATION/TRAINING PROGRAM

## 2024-08-05 PROCEDURE — 83690 ASSAY OF LIPASE: CPT | Performed by: EMERGENCY MEDICINE

## 2024-08-05 PROCEDURE — 81003 URINALYSIS AUTO W/O SCOPE: CPT | Performed by: EMERGENCY MEDICINE

## 2024-08-05 PROCEDURE — 72125 CT NECK SPINE W/O DYE: CPT

## 2024-08-05 PROCEDURE — 71045 X-RAY EXAM CHEST 1 VIEW: CPT

## 2024-08-05 PROCEDURE — 83605 ASSAY OF LACTIC ACID: CPT | Performed by: EMERGENCY MEDICINE

## 2024-08-05 PROCEDURE — 96361 HYDRATE IV INFUSION ADD-ON: CPT

## 2024-08-05 PROCEDURE — 2550000001 HC RX 255 CONTRASTS: Performed by: EMERGENCY MEDICINE

## 2024-08-05 PROCEDURE — 93005 ELECTROCARDIOGRAM TRACING: CPT

## 2024-08-05 PROCEDURE — 2500000004 HC RX 250 GENERAL PHARMACY W/ HCPCS (ALT 636 FOR OP/ED): Performed by: EMERGENCY MEDICINE

## 2024-08-05 PROCEDURE — 74174 CTA ABD&PLVS W/CONTRAST: CPT

## 2024-08-05 PROCEDURE — 80307 DRUG TEST PRSMV CHEM ANLYZR: CPT | Performed by: EMERGENCY MEDICINE

## 2024-08-05 PROCEDURE — 70450 CT HEAD/BRAIN W/O DYE: CPT | Performed by: STUDENT IN AN ORGANIZED HEALTH CARE EDUCATION/TRAINING PROGRAM

## 2024-08-05 PROCEDURE — 96374 THER/PROPH/DIAG INJ IV PUSH: CPT

## 2024-08-05 PROCEDURE — 80053 COMPREHEN METABOLIC PANEL: CPT | Performed by: EMERGENCY MEDICINE

## 2024-08-05 PROCEDURE — 36415 COLL VENOUS BLD VENIPUNCTURE: CPT | Performed by: EMERGENCY MEDICINE

## 2024-08-05 PROCEDURE — 99285 EMERGENCY DEPT VISIT HI MDM: CPT | Mod: 25

## 2024-08-05 PROCEDURE — C9113 INJ PANTOPRAZOLE SODIUM, VIA: HCPCS | Performed by: EMERGENCY MEDICINE

## 2024-08-05 PROCEDURE — 82150 ASSAY OF AMYLASE: CPT | Performed by: EMERGENCY MEDICINE

## 2024-08-05 PROCEDURE — 72125 CT NECK SPINE W/O DYE: CPT | Performed by: STUDENT IN AN ORGANIZED HEALTH CARE EDUCATION/TRAINING PROGRAM

## 2024-08-05 PROCEDURE — 85025 COMPLETE CBC W/AUTO DIFF WBC: CPT | Performed by: EMERGENCY MEDICINE

## 2024-08-05 PROCEDURE — 70450 CT HEAD/BRAIN W/O DYE: CPT

## 2024-08-05 RX ORDER — PANTOPRAZOLE SODIUM 40 MG/10ML
40 INJECTION, POWDER, LYOPHILIZED, FOR SOLUTION INTRAVENOUS ONCE
Status: COMPLETED | OUTPATIENT
Start: 2024-08-05 | End: 2024-08-05

## 2024-08-05 RX ORDER — PANTOPRAZOLE SODIUM 40 MG/10ML
40 INJECTION, POWDER, LYOPHILIZED, FOR SOLUTION INTRAVENOUS DAILY
Status: DISCONTINUED | OUTPATIENT
Start: 2024-08-06 | End: 2024-08-05

## 2024-08-05 ASSESSMENT — PAIN DESCRIPTION - LOCATION: LOCATION: ABDOMEN

## 2024-08-05 ASSESSMENT — LIFESTYLE VARIABLES
EVER HAD A DRINK FIRST THING IN THE MORNING TO STEADY YOUR NERVES TO GET RID OF A HANGOVER: NO
TOTAL SCORE: 0
HAVE YOU EVER FELT YOU SHOULD CUT DOWN ON YOUR DRINKING: NO
EVER FELT BAD OR GUILTY ABOUT YOUR DRINKING: NO
HAVE PEOPLE ANNOYED YOU BY CRITICIZING YOUR DRINKING: NO

## 2024-08-05 ASSESSMENT — PAIN - FUNCTIONAL ASSESSMENT: PAIN_FUNCTIONAL_ASSESSMENT: 0-10

## 2024-08-05 ASSESSMENT — PAIN DESCRIPTION - PAIN TYPE: TYPE: ACUTE PAIN

## 2024-08-05 ASSESSMENT — PAIN SCALES - GENERAL: PAINLEVEL_OUTOF10: 10 - WORST POSSIBLE PAIN

## 2024-08-05 NOTE — Clinical Note
Huddle and Timeout completed together with team. Patient wristband and HARESH information verified.  Anesthesia safety check completed

## 2024-08-05 NOTE — Clinical Note
Patient tolerated procedure well. Appears comfortable with no complaints of pain. VS stable. Arousable prior to transport. Patient transported to room via cart.  Report called by CRNA. Handoff completed

## 2024-08-06 ENCOUNTER — APPOINTMENT (OUTPATIENT)
Dept: RADIOLOGY | Facility: HOSPITAL | Age: 29
End: 2024-08-06
Payer: MEDICAID

## 2024-08-06 ENCOUNTER — APPOINTMENT (OUTPATIENT)
Dept: CARDIOLOGY | Facility: HOSPITAL | Age: 29
End: 2024-08-06
Payer: MEDICAID

## 2024-08-06 PROBLEM — K92.2 GI BLEEDING: Status: ACTIVE | Noted: 2024-08-06

## 2024-08-06 PROBLEM — K29.01 ACUTE GASTRITIS WITH HEMORRHAGE: Status: ACTIVE | Noted: 2024-08-06

## 2024-08-06 PROBLEM — D62 ACUTE BLOOD LOSS ANEMIA: Status: ACTIVE | Noted: 2024-08-06

## 2024-08-06 PROBLEM — K26.9 DUODENAL ULCER: Status: ACTIVE | Noted: 2024-08-06

## 2024-08-06 PROBLEM — K63.1: Status: ACTIVE | Noted: 2024-08-06

## 2024-08-06 LAB
ABO GROUP (TYPE) IN BLOOD: NORMAL
ABO GROUP (TYPE) IN BLOOD: NORMAL
ANTIBODY SCREEN: NORMAL
ATRIAL RATE: 181 BPM
BLOOD EXPIRATION DATE: NORMAL
BLOOD EXPIRATION DATE: NORMAL
DISPENSE STATUS: NORMAL
DISPENSE STATUS: NORMAL
ERYTHROCYTE [DISTWIDTH] IN BLOOD BY AUTOMATED COUNT: 14.3 % (ref 11.5–14.5)
HCT VFR BLD AUTO: 18.7 % (ref 36–46)
HGB BLD-MCNC: 6.3 G/DL (ref 12–16)
HGB BLD-MCNC: 9 G/DL (ref 12–16)
HOLD SPECIMEN: NORMAL
MCH RBC QN AUTO: 30.9 PG (ref 26–34)
MCHC RBC AUTO-ENTMCNC: 33.7 G/DL (ref 32–36)
MCV RBC AUTO: 92 FL (ref 80–100)
NRBC BLD-RTO: 0 /100 WBCS (ref 0–0)
P AXIS: 83 DEGREES
PLATELET # BLD AUTO: 393 X10*3/UL (ref 150–450)
PR INTERVAL: 120 MS
PRODUCT BLOOD TYPE: 1700
PRODUCT BLOOD TYPE: 1700
PRODUCT CODE: NORMAL
PRODUCT CODE: NORMAL
Q ONSET: 223 MS
QRS COUNT: 30 BEATS
QRS DURATION: 70 MS
QT INTERVAL: 258 MS
QTC CALCULATION(BAZETT): 448 MS
QTC FREDERICIA: 372 MS
R AXIS: 87 DEGREES
RBC # BLD AUTO: 2.04 X10*6/UL (ref 4–5.2)
RH FACTOR (ANTIGEN D): NORMAL
RH FACTOR (ANTIGEN D): NORMAL
T AXIS: 64 DEGREES
T OFFSET: 352 MS
UNIT ABO: NORMAL
UNIT ABO: NORMAL
UNIT NUMBER: NORMAL
UNIT NUMBER: NORMAL
UNIT RH: NORMAL
UNIT RH: NORMAL
UNIT VOLUME: 350
UNIT VOLUME: 350
VENTRICULAR RATE: 181 BPM
WBC # BLD AUTO: 16 X10*3/UL (ref 4.4–11.3)
XM INTEP: NORMAL
XM INTEP: NORMAL

## 2024-08-06 PROCEDURE — C9113 INJ PANTOPRAZOLE SODIUM, VIA: HCPCS | Performed by: INTERNAL MEDICINE

## 2024-08-06 PROCEDURE — 85018 HEMOGLOBIN: CPT | Performed by: STUDENT IN AN ORGANIZED HEALTH CARE EDUCATION/TRAINING PROGRAM

## 2024-08-06 PROCEDURE — P9016 RBC LEUKOCYTES REDUCED: HCPCS

## 2024-08-06 PROCEDURE — 76705 ECHO EXAM OF ABDOMEN: CPT | Performed by: RADIOLOGY

## 2024-08-06 PROCEDURE — 86901 BLOOD TYPING SEROLOGIC RH(D): CPT | Performed by: INTERNAL MEDICINE

## 2024-08-06 PROCEDURE — 96361 HYDRATE IV INFUSION ADD-ON: CPT

## 2024-08-06 PROCEDURE — 99222 1ST HOSP IP/OBS MODERATE 55: CPT | Performed by: SURGERY

## 2024-08-06 PROCEDURE — 99223 1ST HOSP IP/OBS HIGH 75: CPT | Performed by: INTERNAL MEDICINE

## 2024-08-06 PROCEDURE — 2500000004 HC RX 250 GENERAL PHARMACY W/ HCPCS (ALT 636 FOR OP/ED): Performed by: INTERNAL MEDICINE

## 2024-08-06 PROCEDURE — 93005 ELECTROCARDIOGRAM TRACING: CPT

## 2024-08-06 PROCEDURE — 30233N1 TRANSFUSION OF NONAUTOLOGOUS RED BLOOD CELLS INTO PERIPHERAL VEIN, PERCUTANEOUS APPROACH: ICD-10-PCS | Performed by: HOSPITALIST

## 2024-08-06 PROCEDURE — 99232 SBSQ HOSP IP/OBS MODERATE 35: CPT | Performed by: STUDENT IN AN ORGANIZED HEALTH CARE EDUCATION/TRAINING PROGRAM

## 2024-08-06 PROCEDURE — 93010 ELECTROCARDIOGRAM REPORT: CPT | Performed by: INTERNAL MEDICINE

## 2024-08-06 PROCEDURE — 36415 COLL VENOUS BLD VENIPUNCTURE: CPT | Performed by: INTERNAL MEDICINE

## 2024-08-06 PROCEDURE — 85027 COMPLETE CBC AUTOMATED: CPT | Performed by: INTERNAL MEDICINE

## 2024-08-06 PROCEDURE — 36430 TRANSFUSION BLD/BLD COMPNT: CPT

## 2024-08-06 PROCEDURE — 76705 ECHO EXAM OF ABDOMEN: CPT

## 2024-08-06 PROCEDURE — 86923 COMPATIBILITY TEST ELECTRIC: CPT

## 2024-08-06 PROCEDURE — 1200000002 HC GENERAL ROOM WITH TELEMETRY DAILY

## 2024-08-06 PROCEDURE — 99222 1ST HOSP IP/OBS MODERATE 55: CPT | Performed by: NURSE PRACTITIONER

## 2024-08-06 PROCEDURE — 36415 COLL VENOUS BLD VENIPUNCTURE: CPT | Performed by: EMERGENCY MEDICINE

## 2024-08-06 PROCEDURE — 82941 ASSAY OF GASTRIN: CPT | Performed by: INTERNAL MEDICINE

## 2024-08-06 RX ORDER — ONDANSETRON 4 MG/1
4 TABLET, FILM COATED ORAL EVERY 8 HOURS PRN
Status: ACTIVE | OUTPATIENT
Start: 2024-08-06

## 2024-08-06 RX ORDER — PANTOPRAZOLE SODIUM 40 MG/10ML
40 INJECTION, POWDER, LYOPHILIZED, FOR SOLUTION INTRAVENOUS 2 TIMES DAILY
Status: DISPENSED | OUTPATIENT
Start: 2024-08-06

## 2024-08-06 RX ORDER — LORAZEPAM 2 MG/ML
1 INJECTION INTRAMUSCULAR ONCE
Status: COMPLETED | OUTPATIENT
Start: 2024-08-06 | End: 2024-08-06

## 2024-08-06 RX ORDER — ONDANSETRON HYDROCHLORIDE 2 MG/ML
4 INJECTION, SOLUTION INTRAVENOUS EVERY 8 HOURS PRN
Status: DISPENSED | OUTPATIENT
Start: 2024-08-06

## 2024-08-06 RX ORDER — MORPHINE SULFATE 2 MG/ML
2 INJECTION, SOLUTION INTRAMUSCULAR; INTRAVENOUS
Status: DISCONTINUED | OUTPATIENT
Start: 2024-08-06 | End: 2024-08-07

## 2024-08-06 RX ORDER — CEFTRIAXONE 1 G/50ML
1 INJECTION, SOLUTION INTRAVENOUS EVERY 24 HOURS
Status: DISPENSED | OUTPATIENT
Start: 2024-08-06

## 2024-08-06 RX ORDER — HYDROMORPHONE HYDROCHLORIDE 0.2 MG/ML
0.2 INJECTION INTRAMUSCULAR; INTRAVENOUS; SUBCUTANEOUS ONCE
Status: COMPLETED | OUTPATIENT
Start: 2024-08-06 | End: 2024-08-06

## 2024-08-06 RX ORDER — METRONIDAZOLE 500 MG/100ML
500 INJECTION, SOLUTION INTRAVENOUS EVERY 8 HOURS
Status: DISPENSED | OUTPATIENT
Start: 2024-08-06

## 2024-08-06 RX ORDER — SODIUM CHLORIDE, SODIUM LACTATE, POTASSIUM CHLORIDE, CALCIUM CHLORIDE 600; 310; 30; 20 MG/100ML; MG/100ML; MG/100ML; MG/100ML
100 INJECTION, SOLUTION INTRAVENOUS CONTINUOUS
Status: DISCONTINUED | OUTPATIENT
Start: 2024-08-06 | End: 2024-08-07

## 2024-08-06 SDOH — SOCIAL STABILITY: SOCIAL INSECURITY: DO YOU FEEL UNSAFE GOING BACK TO THE PLACE WHERE YOU ARE LIVING?: NO

## 2024-08-06 SDOH — SOCIAL STABILITY: SOCIAL NETWORK: HOW OFTEN DO YOU GET TOGETHER WITH FRIENDS OR RELATIVES?: THREE TIMES A WEEK

## 2024-08-06 SDOH — SOCIAL STABILITY: SOCIAL INSECURITY: HAVE YOU HAD THOUGHTS OF HARMING ANYONE ELSE?: NO

## 2024-08-06 SDOH — ECONOMIC STABILITY: FOOD INSECURITY: WITHIN THE PAST 12 MONTHS, YOU WORRIED THAT YOUR FOOD WOULD RUN OUT BEFORE YOU GOT MONEY TO BUY MORE.: SOMETIMES TRUE

## 2024-08-06 SDOH — ECONOMIC STABILITY: HOUSING INSECURITY: AT ANY TIME IN THE PAST 12 MONTHS, WERE YOU HOMELESS OR LIVING IN A SHELTER (INCLUDING NOW)?: NO

## 2024-08-06 SDOH — ECONOMIC STABILITY: FOOD INSECURITY: WITHIN THE PAST 12 MONTHS, THE FOOD YOU BOUGHT JUST DIDN'T LAST AND YOU DIDN'T HAVE MONEY TO GET MORE.: SOMETIMES TRUE

## 2024-08-06 SDOH — SOCIAL STABILITY: SOCIAL NETWORK
DO YOU BELONG TO ANY CLUBS OR ORGANIZATIONS SUCH AS CHURCH GROUPS UNIONS, FRATERNAL OR ATHLETIC GROUPS, OR SCHOOL GROUPS?: NO

## 2024-08-06 SDOH — SOCIAL STABILITY: SOCIAL INSECURITY: ABUSE: ADULT

## 2024-08-06 SDOH — HEALTH STABILITY: PHYSICAL HEALTH: ON AVERAGE, HOW MANY MINUTES DO YOU ENGAGE IN EXERCISE AT THIS LEVEL?: 0 MIN

## 2024-08-06 SDOH — SOCIAL STABILITY: SOCIAL INSECURITY: WITHIN THE LAST YEAR, HAVE YOU BEEN HUMILIATED OR EMOTIONALLY ABUSED IN OTHER WAYS BY YOUR PARTNER OR EX-PARTNER?: NO

## 2024-08-06 SDOH — HEALTH STABILITY: MENTAL HEALTH: EXPERIENCED ANY OF THE FOLLOWING LIFE EVENTS: DEATH OF FAMILY/FRIEND;WITNESS TO INJURY TO OR DEATH OF SOMEONE ELSE

## 2024-08-06 SDOH — SOCIAL STABILITY: SOCIAL INSECURITY
WITHIN THE LAST YEAR, HAVE TO BEEN RAPED OR FORCED TO HAVE ANY KIND OF SEXUAL ACTIVITY BY YOUR PARTNER OR EX-PARTNER?: NO

## 2024-08-06 SDOH — HEALTH STABILITY: PHYSICAL HEALTH: ON AVERAGE, HOW MANY DAYS PER WEEK DO YOU ENGAGE IN MODERATE TO STRENUOUS EXERCISE (LIKE A BRISK WALK)?: 0 DAYS

## 2024-08-06 SDOH — ECONOMIC STABILITY: INCOME INSECURITY: HOW HARD IS IT FOR YOU TO PAY FOR THE VERY BASICS LIKE FOOD, HOUSING, MEDICAL CARE, AND HEATING?: HARD

## 2024-08-06 SDOH — ECONOMIC STABILITY: INCOME INSECURITY: IN THE LAST 12 MONTHS, WAS THERE A TIME WHEN YOU WERE NOT ABLE TO PAY THE MORTGAGE OR RENT ON TIME?: NO

## 2024-08-06 SDOH — SOCIAL STABILITY: SOCIAL INSECURITY
WITHIN THE LAST YEAR, HAVE YOU BEEN KICKED, HIT, SLAPPED, OR OTHERWISE PHYSICALLY HURT BY YOUR PARTNER OR EX-PARTNER?: NO

## 2024-08-06 SDOH — SOCIAL STABILITY: SOCIAL INSECURITY: WITHIN THE LAST YEAR, HAVE YOU BEEN AFRAID OF YOUR PARTNER OR EX-PARTNER?: NO

## 2024-08-06 SDOH — SOCIAL STABILITY: SOCIAL NETWORK: ARE YOU MARRIED, WIDOWED, DIVORCED, SEPARATED, NEVER MARRIED, OR LIVING WITH A PARTNER?: NEVER MARRIED

## 2024-08-06 SDOH — HEALTH STABILITY: MENTAL HEALTH
STRESS IS WHEN SOMEONE FEELS TENSE, NERVOUS, ANXIOUS, OR CAN'T SLEEP AT NIGHT BECAUSE THEIR MIND IS TROUBLED. HOW STRESSED ARE YOU?: TO SOME EXTENT

## 2024-08-06 SDOH — ECONOMIC STABILITY: HOUSING INSECURITY: IN THE PAST 12 MONTHS, HOW MANY TIMES HAVE YOU MOVED WHERE YOU WERE LIVING?: 1

## 2024-08-06 SDOH — SOCIAL STABILITY: SOCIAL INSECURITY: DO YOU FEEL ANYONE HAS EXPLOITED OR TAKEN ADVANTAGE OF YOU FINANCIALLY OR OF YOUR PERSONAL PROPERTY?: NO

## 2024-08-06 SDOH — SOCIAL STABILITY: SOCIAL INSECURITY: ARE THERE ANY APPARENT SIGNS OF INJURIES/BEHAVIORS THAT COULD BE RELATED TO ABUSE/NEGLECT?: NO

## 2024-08-06 SDOH — ECONOMIC STABILITY: TRANSPORTATION INSECURITY
IN THE PAST 12 MONTHS, HAS THE LACK OF TRANSPORTATION KEPT YOU FROM MEDICAL APPOINTMENTS OR FROM GETTING MEDICATIONS?: NO

## 2024-08-06 SDOH — SOCIAL STABILITY: SOCIAL NETWORK: HOW OFTEN DO YOU ATTEND CHURCH OR RELIGIOUS SERVICES?: NEVER

## 2024-08-06 SDOH — SOCIAL STABILITY: SOCIAL INSECURITY: HAS ANYONE EVER THREATENED TO HURT YOUR FAMILY OR YOUR PETS?: NO

## 2024-08-06 SDOH — SOCIAL STABILITY: SOCIAL INSECURITY: DOES ANYONE TRY TO KEEP YOU FROM HAVING/CONTACTING OTHER FRIENDS OR DOING THINGS OUTSIDE YOUR HOME?: NO

## 2024-08-06 SDOH — ECONOMIC STABILITY: TRANSPORTATION INSECURITY
IN THE PAST 12 MONTHS, HAS LACK OF TRANSPORTATION KEPT YOU FROM MEETINGS, WORK, OR FROM GETTING THINGS NEEDED FOR DAILY LIVING?: NO

## 2024-08-06 SDOH — SOCIAL STABILITY: SOCIAL INSECURITY: HAVE YOU HAD ANY THOUGHTS OF HARMING ANYONE ELSE?: NO

## 2024-08-06 SDOH — SOCIAL STABILITY: SOCIAL INSECURITY: ARE YOU OR HAVE YOU BEEN THREATENED OR ABUSED PHYSICALLY, EMOTIONALLY, OR SEXUALLY BY ANYONE?: NO

## 2024-08-06 SDOH — SOCIAL STABILITY: SOCIAL NETWORK: IN A TYPICAL WEEK, HOW MANY TIMES DO YOU TALK ON THE PHONE WITH FAMILY, FRIENDS, OR NEIGHBORS?: THREE TIMES A WEEK

## 2024-08-06 SDOH — SOCIAL STABILITY: SOCIAL NETWORK: HOW OFTEN DO YOU ATTENT MEETINGS OF THE CLUB OR ORGANIZATION YOU BELONG TO?: NEVER

## 2024-08-06 ASSESSMENT — PAIN - FUNCTIONAL ASSESSMENT
PAIN_FUNCTIONAL_ASSESSMENT: 0-10

## 2024-08-06 ASSESSMENT — ENCOUNTER SYMPTOMS
SHORTNESS OF BREATH: 1
UNEXPECTED WEIGHT CHANGE: 0
ABDOMINAL PAIN: 1
WEAKNESS: 1
VOMITING: 1
MYALGIAS: 0
BLOOD IN STOOL: 1
CHILLS: 1
FEVER: 1
DYSURIA: 0

## 2024-08-06 ASSESSMENT — PAIN DESCRIPTION - PAIN TYPE
TYPE: ACUTE PAIN

## 2024-08-06 ASSESSMENT — ACTIVITIES OF DAILY LIVING (ADL)
DRESSING YOURSELF: INDEPENDENT
HEARING - LEFT EAR: FUNCTIONAL
HEARING - RIGHT EAR: FUNCTIONAL
WALKS IN HOME: INDEPENDENT
FEEDING YOURSELF: INDEPENDENT
ADEQUATE_TO_COMPLETE_ADL: YES
PATIENT'S MEMORY ADEQUATE TO SAFELY COMPLETE DAILY ACTIVITIES?: YES
JUDGMENT_ADEQUATE_SAFELY_COMPLETE_DAILY_ACTIVITIES: YES
ADEQUATE_TO_COMPLETE_ADL: YES
BATHING: INDEPENDENT
TOILETING: INDEPENDENT
GROOMING: INDEPENDENT

## 2024-08-06 ASSESSMENT — PAIN SCALES - GENERAL
PAINLEVEL_OUTOF10: 9
PAINLEVEL_OUTOF10: 0 - NO PAIN
PAINLEVEL_OUTOF10: 9
PAINLEVEL_OUTOF10: 9
PAINLEVEL_OUTOF10: 8
PAINLEVEL_OUTOF10: 7
PAINLEVEL_OUTOF10: 9
PAINLEVEL_OUTOF10: 10 - WORST POSSIBLE PAIN
PAINLEVEL_OUTOF10: 0 - NO PAIN

## 2024-08-06 ASSESSMENT — PAIN DESCRIPTION - PROGRESSION
CLINICAL_PROGRESSION: GRADUALLY WORSENING
CLINICAL_PROGRESSION: GRADUALLY IMPROVING

## 2024-08-06 ASSESSMENT — COGNITIVE AND FUNCTIONAL STATUS - GENERAL
DAILY ACTIVITIY SCORE: 24
MOBILITY SCORE: 24
PATIENT BASELINE BEDBOUND: NO

## 2024-08-06 ASSESSMENT — PAIN DESCRIPTION - DESCRIPTORS
DESCRIPTORS: SHARP
DESCRIPTORS: SHARP;PRESSURE
DESCRIPTORS: SHARP

## 2024-08-06 ASSESSMENT — PAIN DESCRIPTION - LOCATION
LOCATION: ABDOMEN

## 2024-08-06 ASSESSMENT — LIFESTYLE VARIABLES
AUDIT TOTAL SCORE: 4
HOW OFTEN DURING THE LAST YEAR HAVE YOU FAILED TO DO WHAT WAS NORMALLY EXPECTED FROM YOU BECAUSE OF DRINKING: LESS THAN MONTHLY
HOW OFTEN DURING THE LAST YEAR HAVE YOU BEEN UNABLE TO REMEMBER WHAT HAPPENED THE NIGHT BEFORE BECAUSE YOU HAD BEEN DRINKING: LESS THAN MONTHLY
AUDIT TOTAL SCORE: 11
SUBSTANCE_ABUSE_PAST_12_MONTHS: YES
HOW OFTEN DO YOU HAVE A DRINK CONTAINING ALCOHOL: 2-3 TIMES A WEEK
HAVE YOU OR SOMEONE ELSE BEEN INJURED AS A RESULT OF YOUR DRINKING: NO
HOW MANY STANDARD DRINKS CONTAINING ALCOHOL DO YOU HAVE ON A TYPICAL DAY: 5 OR 6
HOW OFTEN DO YOU HAVE 6 OR MORE DRINKS ON ONE OCCASION: MONTHLY
PRESCIPTION_ABUSE_PAST_12_MONTHS: NO
HAS A RELATIVE, FRIEND, DOCTOR, OR ANOTHER HEALTH PROFESSIONAL EXPRESSED CONCERN ABOUT YOUR DRINKING OR SUGGESTED YOU CUT DOWN: NO
HOW OFTEN DURING THE LAST YEAR HAVE YOU HAD A FEELING OF GUILT OR REMORSE AFTER DRINKING: LESS THAN MONTHLY
SKIP TO QUESTIONS 9-10: 0
AUDIT-C TOTAL SCORE: 7
HOW OFTEN DURING THE LAST YEAR HAVE YOU NEEDED AN ALCOHOLIC DRINK FIRST THING IN THE MORNING TO GET YOURSELF GOING AFTER A NIGHT OF HEAVY DRINKING: NEVER
HOW OFTEN DURING THE LAST YEAR HAVE YOU FOUND THAT YOU WERE NOT ABLE TO STOP DRINKING ONCE YOU HAD STARTED: LESS THAN MONTHLY
AUDIT-C TOTAL SCORE: 7

## 2024-08-06 ASSESSMENT — PAIN DESCRIPTION - ORIENTATION
ORIENTATION: RIGHT
ORIENTATION: RIGHT

## 2024-08-06 ASSESSMENT — PATIENT HEALTH QUESTIONNAIRE - PHQ9
1. LITTLE INTEREST OR PLEASURE IN DOING THINGS: SEVERAL DAYS
SUM OF ALL RESPONSES TO PHQ9 QUESTIONS 1 & 2: 2
2. FEELING DOWN, DEPRESSED OR HOPELESS: SEVERAL DAYS

## 2024-08-06 ASSESSMENT — PAIN DESCRIPTION - ONSET: ONSET: ONGOING

## 2024-08-06 NOTE — CONSULTS
Department of Internal Medicine  Gastroenterology  Consult note      Reason for Consult: GIB    Chief Complaint: Hematemesis and rectal bleeding    History Obtained from: Prior medical records and medical staff. Limited history from the patient since somnolent at time of visit    History of Present Illness      The patient is a 28 y.o. female with signficant past medical history of asthma, depression, bipolar disorder, ADHD, and PTSD presented [facility] ER from shelter for rectal bleeding and hematemesis.      The patient somnolent at time of visit this morning, not answering many questions, but oriented x 3 when awake.     Per prior medical records patient has been having hematemesis and rectal bleeding for approximately 3 days with constant sharp upper abdominal pain, poor appetite with N/V.  Blood clots reported in emesis. There were also chest pain, shortness of breath and dizziness.  No history of prior GIB.  No prior EGD or colonoscopy.  Patient denies taking aspirin or other regular NSAID use. Currently detained at a local 's office.     Per RN in the ER, the patient has not had any vomiting this morning, patient reports last episode of hematemesis was yesterday.  Per RN, the patient had 1 black tarry bowel movement this morning.   She also complains of fever, chills and generalized weakness.  It is unclear when the patient last used fentanyl, but reportedly per prior notes she last used it 6-7 days ago.  No history of abdominal surgeries.  She also smokes marijuana    In the ER, she arrived initially tachycardic with heart rate 130-140s, hemoglobin 8.1->6.3 today.  2 units PRBCs ordered     CT angio abdomen and pelvis showed a large clot in the gastric body and fundus without evidence of active bleeding, diffuse fold thickening indicative of gastritis, ulceration along duodenal bulb with fat stranding, fluid, and reactive gallbladder wall thickening likely representing microperforated duodenal ulcer, and  collection of fluid and air along duodenum that could relate to additional ulcer or diverticulum.  No free intraperitoneal air.  She is admitted for GI bleeding. General surgery was also consulted for possible perforation.      Spoke with general surgeon (Dr. Escobar) who reports no plan for surgical intervention at this time, but general surgery will continue to follow       Allergies  Amoxicillin and Penicillins    Current Medication    Current Facility-Administered Medications:     cefTRIAXone (Rocephin) 1 g in dextrose (iso) IV 50 mL, 1 g, intravenous, q24h, Miko Mtz MD    lactated Ringer's infusion, 100 mL/hr, intravenous, Continuous, Miko Mtz MD, Stopped at 08/06/24 0559    metroNIDAZOLE (Flagyl) 500 mg in sodium chloride (iso)  mL, 500 mg, intravenous, q8h, Miko Mtz MD    morphine injection 2 mg, 2 mg, intravenous, q3h PRN, Miko Mtz MD    ondansetron (Zofran) tablet 4 mg, 4 mg, oral, q8h PRN **OR** ondansetron (Zofran) injection 4 mg, 4 mg, intravenous, q8h PRN, Miko Mtz MD    pantoprazole (ProtoNix) injection 40 mg, 40 mg, intravenous, BID, Miko Mtz MD  No current outpatient medications on file.    Past Medical History  Active Ambulatory Problems     Diagnosis Date Noted    No Active Ambulatory Problems     Resolved Ambulatory Problems     Diagnosis Date Noted    No Resolved Ambulatory Problems     No Additional Past Medical History       Past Surgical History  History reviewed. No pertinent surgical history.    Family History  No family history on file.  Unknown if family history of stomach or colon cancer    Social History  TOBACCO: positive for tobacco use  ETOH: Alcohol use questions deferred to the physician. Unable to assess since patient somnolent this morning and did not answer when asked  DRUGS: fentanyl use and marijuana use  MARITAL STATUS:   OCCUPATION:    Review of Systems  All 10 systems reviewed with the patient/family and negative except for what is  "mentioned in HPI      PHYSICAL EXAM  VS: /60   Pulse (!) 124   Temp 36.8 °C (98.2 °F) (Temporal)   Resp 20   Ht 1.6 m (5' 3\")   Wt 61.2 kg (135 lb)   SpO2 100%   BMI 23.91 kg/m²  Body mass index is 23.91 kg/m².  Physical Exam  Vitals reviewed.   Constitutional:       General: She is not in acute distress.     Appearance: She is ill-appearing.      Comments: Handcuffed to the bed   HENT:      Head: Normocephalic.      Nose: Nose normal.      Mouth/Throat:      Mouth: Mucous membranes are dry.      Pharynx: Oropharynx is clear.   Eyes:      Extraocular Movements: Extraocular movements intact.      Pupils: Pupils are equal, round, and reactive to light.   Cardiovascular:      Rate and Rhythm: Normal rate and regular rhythm.      Pulses: Normal pulses.      Heart sounds: Normal heart sounds.   Pulmonary:      Effort: Pulmonary effort is normal.      Breath sounds: Normal breath sounds.   Abdominal:      General: Bowel sounds are normal. There is no distension.      Palpations: Abdomen is soft.      Tenderness: There is abdominal tenderness. There is guarding. There is no rebound.      Comments: Tenderness to all upper quadrants to palpation   Musculoskeletal:         General: Normal range of motion.      Cervical back: Normal range of motion.   Skin:     General: Skin is warm and dry.      Coloration: Skin is pale.   Neurological:      Mental Status: She is alert and oriented to person, place, and time.      Comments: Somnolent at time of visit. Arousable to voice, opens her eyes, but not answering many questions at time of visit. Oriented x3 when awake   Psychiatric:         Mood and Affect: Mood normal.         Behavior: Behavior normal.          DATA  Recent blood work and relevant radiology and endoscopic studies were reviewed and discussed with the patient   Results from last 7 days   Lab Units 08/06/24  0455   WBC AUTO x10*3/uL 16.0*   RBC AUTO x10*6/uL 2.04*   HEMOGLOBIN g/dL 6.3*   HEMATOCRIT % " 18.7*   MCV fL 92   MCHC g/dL 33.7   RDW % 14.3   PLATELETS AUTO x10*3/uL 393       Results from last 72 hours   Lab Units 08/05/24  2238   SODIUM mmol/L 133*   POTASSIUM mmol/L 3.5   CHLORIDE mmol/L 100   CO2 mmol/L 24   BUN mg/dL 30*   CREATININE mg/dL 0.64   CALCIUM mg/dL 7.9*   PROTEIN TOTAL g/dL 5.6*   BILIRUBIN TOTAL mg/dL 0.2   ALK PHOS U/L 45   AST U/L 29   ALT U/L 15             Results from last 72 hours   Lab Units 08/05/24  2238   LIPASE U/L 49       RADIOLOGY REVIEW    CT angio A/P 8/6/24:  IMPRESSION:  1. Large clot within the gastric body and fundus measuring  approximately 6.2 cm x 4.3 cm without evidence of active bleeding  from the gastric body or fundus but there is diffuse fold thickening  indicative of gastritis.      2. Inflamed deep ulceration along the anterolateral wall of the  duodenal bulb with adjacent fat stranding, fluid, and reactive  gallbladder wall thickening, likely representing micro perforated  duodenal ulcer. No evidence of free air or abscess. Recommend  gastroenterology consultation for endoscopic assessment.      3. Additional ulcer versus duodenal diverticulum along the medial  wall.      RUQ US 8/6/24  IMPRESSION:  The gallbladder is contracted, which limits evaluation. No  sonographic evidence of cholelithiasis. Nonspecific heterogeneous  appearance of the liver. Correlate with LFTs. (Of note, LFTS currently WNL)    ENDOSCOPIC REVIEW  No prior EGD or colonoscopy      IMPRESSION/RECOMMENDATIONS  The patient is a 28 y.o. female with signficant past medical history of asthma, depression, bipolar disorder, ADHD, and PTSD presented to the ER from assisted for rectal bleeding and hematemesis.        #GIB - hematemesis and melena. Elevated BUN:Cr ratio indicating increased likelihood of UGIB.  #Abnormal CT angio noting likely micro perforated duodenal ulcer as well as large clot in the gastric body.  Initially tachy and hypotensive, but improved with IVF and blood. (Current VS: HR 99  and /62)  #symptomatic acute anemia - Hgb 8.1->6.3 over 1 day, (No prior hgb available for comparison) 2uPRBCs ordered with hgb incrementing to 9.0   # History of drug abuse-urine tox positive for fentanyl      Plan  -After review of CT noting likely ulcer in the setting of micro perforation, EGD contraindicated. Also patient is higher risk for sedation due to urine tox testing positive for fentanyl  -General surgery following with no plan for surgical intervention at this time per Dr. Molina  -PPI bolus followed by PPI 40 mg IV BID  -keep NPO  -trend hgb and hct  -monitor consistency and color of stool. Monitor for signs of overt GI bleeding  -tranfuse for hgb < 7.0  -avoid NSAIDs                    Plan discussed with Dr. High. GI will sign off, but please call with any questions or concerns  (Electronically signed byJUSTIN Zamorano on 8/6/2024 at 8:10 AM)       Inpatient consult to gastroenterology  Consult performed by: JUSTIN Zamorano  Consult ordered by: Miko Mtz MD

## 2024-08-06 NOTE — PROGRESS NOTES
Lisa Montemayor is a 28 y.o. female on day 0 of admission presenting with GI bleeding.      Subjective   Hemodynamically unstable, tachycardic, severe epigastric pain, agitated, tearful  No plan for EGD due to high risk status and high risk of sedation due to positive Utox for Fentanyl   GI and acute care surgery both following     Objective     Last Recorded Vitals  /62   Pulse 99   Temp 37.8 °C (100 °F) (Temporal)   Resp 12   Wt 57.7 kg (127 lb 3.3 oz)   SpO2 99%   Intake/Output last 3 Shifts:    Intake/Output Summary (Last 24 hours) at 8/6/2024 1510  Last data filed at 8/6/2024 0949  Gross per 24 hour   Intake 1050 ml   Output --   Net 1050 ml       Admission Weight  Weight: 61.2 kg (135 lb) (08/05/24 2233)    Daily Weight  08/06/24 : 57.7 kg (127 lb 3.3 oz)    Image Results  ECG 12 lead  Sinus tachycardia  Marked ST abnormality, possible inferior subendocardial injury  Abnormal ECG  When compared with ECG of 06-AUG-2024 03:39, (unconfirmed)  No significant change was found  See ED provider note for full interpretation and clinical correlation  Confirmed by Evelyn Jose ([id number]) on 8/6/2024 11:22:38 AM  US right upper quadrant  Narrative: Interpreted By:  Finkelstein, Evan,   STUDY:  US RIGHT UPPER QUADRANT;  8/6/2024 4:42 am      INDICATION:  Signs/Symptoms:Abdominal pain, gallbladder wall thickening on CT scan.      COMPARISON:  None.      ACCESSION NUMBER(S):  [id number]      ORDERING CLINICIAN:  DAGOBERTO CUBA      TECHNIQUE:  Grayscale and color Doppler sonographic imaging of the right upper  quadrant.      FINDINGS:  LIVER: 13.1 cm. Heterogeneous echogenicity, and echotexture. No focal  abnormalities.      BILE DUCTS: No intrahepatic or extrahepatic bile duct dilatation.  Common Bile Duct =   3.4 mm.      GALLBLADDER: Contracted, which limits evaluation. No definite  gallstones. The wall measures up to 3 mm in thickness, with  evaluation limited due to its contracted state. Negative  sonographic  Marin's sign as reported by the technologist.      PANCREAS: Visualized portions are within normal limits.      RIGHT KIDNEY: 8.4 cm. Atrophic in appearance. No hydronephrosis. Free  fluid is located between the right kidney and liver.      PERITONEUM: No upper abdominal ascites.      Impression: The gallbladder is contracted, which limits evaluation. No  sonographic evidence of cholelithiasis. Nonspecific heterogeneous  appearance of the liver. Correlate with LFTs.      MACRO:  None.      Signed by: Evan Finkelstein 8/6/2024 4:59 AM  Dictation workstation:   [id number]  CT angio abdomen pelvis w and or wo IV IV contrast  Narrative: Interpreted By:  Elias Ortega,   STUDY:  CT ANGIO ABDOMEN PELVIS W AND/OR WO IV IV CONTRAST;  8/5/2024 11:32 pm      INDICATION:  Signs/Symptoms:GI bleed.      COMPARISON:  None.      ACCESSION NUMBER(S):  [id number]      ORDERING CLINICIAN:  STEPHANIE MAST      TECHNIQUE:  Contiguous non-contrast axial images of the abdomen and pelvis were  initially obtained.      Thin-section axial images of the abdomen and pelvis were obtained in  the arterial and portal venous phase after intravenous administration  of iodinated contrast. Sagittal and coronal reformatted images were  provided. MIP images and 3D reconstructions were created on an  independent workstation and reviewed.      FINDINGS:  VASCULATURE:      ABDOMINAL AORTA: No abdominal aortic aneurysm or dissection. No  significant abdominal aortic atherosclerosis.      ABDOMINAL and PELVIC ARTERIES:  No hemodynamically significant  stenosis or occlusion.          CT ABDOMEN/PELVIS:      LOWER CHEST: No acute abnormality.      ABDOMINAL WALL: No significant abnormality.      LIVER: No significant parenchymal abnormality.      BILE DUCTS: No significant intrahepatic or extrahepatic dilatation.      GALLBLADDER: There is diffuse gallbladder wall edema, likely in part  reactive to adjacent gastric ulcer.       PANCREAS: No significant abnormality.      SPLEEN: No significant abnormality.      ADRENALS: No significant abnormality.      KIDNEYS, URETERS, BLADDER: No significant abnormality.      REPRODUCTIVE ORGANS: No significant abnormality.      VESSELS: (See above). No additional significant abnormality.      LYMPH NODES/RETROPERITONEUM: No enlarged lymph nodes. No acute  retroperitoneal abnormality.      BOWEL/MESENTERY/PERITONEUM: There is diffuse gastric fold thickening  in the fundus and body of the stomach. There is a large hyperdense  globular filling defect in the gastric fundus and body measuring  approximately 6.2 cm x 4.3 cm consistent with clot. There is wall  thickening of the gastric antrum, pylorus, and duodenal bulb  associated with both mucosal hyperenhancement and sub mucosal wall  thickening. Along the anterior wall of the duodenal bulb there is a  deep ulceration measuring approximately 1 cm deep and 1.5 cm wide  within ulcer neck of approximately 0.4 cm diameter. This ulcer  projects directly into the gallbladder fossa and there is prominent  surrounding fat stranding and fluid and reactive gallbladder wall  thickening. Along the medial wall the descending duodenum there is a  larger collection of fluid and air measuring 3.2 cm x 1.3 cm note  could relate to an additional broad ulcer or diverticulum, the latter  favored. There is no evidence of active gastrointestinal bleeding.  There is no free intraperitoneal air.          OSSEOUS STRUCTURES: No acute osseous abnormality.      Impression: 1. Large clot within the gastric body and fundus measuring  approximately 6.2 cm x 4.3 cm without evidence of active bleeding  from the gastric body or fundus but there is diffuse fold thickening  indicative of gastritis.      2. Inflamed deep ulceration along the anterolateral wall of the  duodenal bulb with adjacent fat stranding, fluid, and reactive  gallbladder wall thickening, likely representing micro  perforated  duodenal ulcer. No evidence of free air or abscess. Recommend  gastroenterology consultation for endoscopic assessment.      3. Additional ulcer versus duodenal diverticulum along the medial  wall.      MACRO:  None.      Signed by: Elias Ortega 8/6/2024 12:30 AM  Dictation workstation:   [id number]  CT head wo IV contrast, CT cervical spine wo IV contrast  Narrative: Interpreted By:  Elias Ortega,   STUDY:  CT HEAD WO IV CONTRAST; CT CERVICAL SPINE WO IV CONTRAST;  8/5/2024  11:26 pm      INDICATION:  Signs/Symptoms:Trauma      COMPARISON:  None.      ACCESSION NUMBER(S):  [id number]; [id number]      ORDERING CLINICIAN:  STEPHANIE MAST      TECHNIQUE:  Axial noncontrast CT images of head with coronal and sagittal  reconstructed images. Axial noncontrast CT images of the cervical  spine with coronal and sagittal reconstructed images.      FINDINGS:  CT HEAD:      BRAIN PARENCHYMA:  No acute intraparenchymal hemorrhage or  parenchymal evidence of acute large territory ischemic infarct. No  mass-effect. Gray-white matter distinction is preserved.      VENTRICLES and EXTRA-AXIAL SPACES:  No acute extra-axial or  intraventricular hemorrhage. No effacement of cerebral sulci.  Ventricles and sulci are age-concordant.      PARANASAL SINUSES/MASTOIDS:  No hemorrhage or air-fluid levels within  the visualized paranasal sinuses. The mastoids are well aerated.      CALVARIUM/ORBITS:  No skull fracture. The orbits and globes are  intact to the extent visualized.      EXTRACRANIAL SOFT TISSUES: No discernible hematoma.          CT CERVICAL SPINE:      PREVERTEBRAL SOFT TISSUES: Within normal limits.      CRANIOCERVICAL JUNCTION: Intact.      ALIGNMENT:  No traumatic malalignment or traumatic facet widening.      VERTEBRAE:  No acute fracture. Vertebral body heights are maintained.      SPINAL CANAL/INTERVERTEBRAL DISCS: No high-grade spinal canal  stenosis. No significant disc height loss.      NEURAL  FORAMINA: No significant neural foraminal stenosis.      OTHER: None.      Impression: CT HEAD:  1. No acute intracranial abnormality or calvarial fracture.              CT CERVICAL SPINE:  1. No acute fracture or traumatic malalignment of the cervical spine.      MACRO:  None.      Signed by: Elias Ortega 8/6/2024 12:16 AM  Dictation workstation:   [id number]  XR chest 1 view  Narrative: Interpreted By:  Elias Ortega,   STUDY:  XR CHEST 1 VIEW;  8/5/2024 11:14 pm      INDICATION:  Signs/Symptoms:Chest pain.      COMPARISON:  07/26/2014      ACCESSION NUMBER(S):  [id number]      ORDERING CLINICIAN:  STEPHANIE MAST      FINDINGS:          The cardiomediastinal silhouette and pulmonary vasculature are within  normal limits. No consolidation, pleural effusion or pneumothorax.          Impression: No acute cardiopulmonary process.          MACRO:  None.      Signed by: Elias Ortega 8/6/2024 12:13 AM  Dictation workstation:   [id number]      Physical Exam    General: Well-developed ill female, agitated, distressed due to severe abd pain  HEENT: AT, NC, no JVD, no lymphadenopathy, neck supple  Lungs: Clear, no wheezing, no crackles  Cardiac: Tachycardic, no murmur, no gallop  Abdomen: Severe epigastric tenderness, no distention, diminished bowel sounds  Extremities: No deformity, no edema, pulses intact, ROM intact  Neurological: Alert awake oriented x3, sensation intact, clear speech, tearful, agitated       Assessment/Plan      Principal Problem:    GI bleeding  Active Problems:    Duodenal ulcer    Perforated ulcer of intestine (Multi)    Acute blood loss anemia    Acute gastritis with hemorrhage    Lisa Montemayor is a 28 y.o. female with history of drug abuse, who was admitted for the mgmt of upper GI bleed, severe abdominal pain, duodenal microperforation, gastric clots, acute anemia    Telemetry monitor  N.p.o., IV fluid hydration, antiemetic, IV Protonix  Avoid NSAID  Monitor CBC, transfuse  to keep hemoglobin >7  General Surgery and GI both following  Will continue with IV Rocephin and Flagyl for 72 hours  VTE prophylaxis: None, high risk of bleeding  Disposition: KOBE Shah MD

## 2024-08-06 NOTE — ED PROVIDER NOTES
HPI   Chief Complaint   Patient presents with    Black or Bloody Stool       Chief complaint GI bleed   History of present illness this is a 28-year-old female who was detained by local juvenile correctional facility and is here with GI bleeding passing out.  Black tarry stools.  And there may have a slight suggestion of overdose.  Brought in with a blood pressure 129/61 temp 36 pulse 112 respiratory 60 pulse ox 97%                Patient History   History reviewed. No pertinent past medical history.  History reviewed. No pertinent surgical history.  No family history on file.  Social History     Tobacco Use    Smoking status: Every Day     Current packs/day: 1.00     Average packs/day: 1 pack/day for 18.0 years (18.0 ttl pk-yrs)     Types: Cigarettes     Passive exposure: Current    Smokeless tobacco: Never   Substance Use Topics    Alcohol use: Yes     Alcohol/week: 4.0 standard drinks of alcohol     Types: 4 Shots of liquor per week    Drug use: Not Currently       Physical Exam   ED Triage Vitals   Temp Pulse Resp BP   -- -- -- --      SpO2 Temp src Heart Rate Source Patient Position   -- -- -- --      BP Location FiO2 (%)     -- --       Physical Exam  Vitals and nursing note reviewed. Exam conducted with a chaperone present.   Constitutional:       General: She is in acute distress.      Appearance: Normal appearance. She is ill-appearing.   HENT:      Head: Normocephalic and atraumatic.      Nose: Nose normal.      Mouth/Throat:      Mouth: Mucous membranes are moist.   Eyes:      Pupils: Pupils are equal, round, and reactive to light.   Cardiovascular:      Rate and Rhythm: Normal rate and regular rhythm.   Pulmonary:      Effort: Pulmonary effort is normal.   Abdominal:      Palpations: Abdomen is soft.   Musculoskeletal:         General: Normal range of motion.      Cervical back: Normal range of motion.   Skin:     General: Skin is warm and dry.      Capillary Refill: Capillary refill takes more than 3  seconds.      Coloration: Skin is pale.   Neurological:      General: No focal deficit present.      Mental Status: She is alert.      Cranial Nerves: No cranial nerve deficit.      Sensory: No sensory deficit.      Motor: No weakness.      Coordination: Coordination normal.      Gait: Gait normal.      Deep Tendon Reflexes: Reflexes normal.   Psychiatric:         Mood and Affect: Mood normal.           ED Course & MDM   Diagnoses as of 08/13/24 0021   GI bleeding                       No data recorded                      Medical Decision Making  Differential diagnosis  GI bleed active  Syncope  Drug overdose  Head injury  Altered mental status considering the above differential diagnosis following tests and treatments were considered pneumonia shared decision making CT of the head  CTA of the abdomen pelvis CBC electrolytes Protonix Zofran type and screen blood transfusion    Amount and/or Complexity of Data Reviewed  Labs: ordered. Decision-making details documented in ED Course.     Details: Labs were reviewed  ECG/medicine tests: ordered and independent interpretation performed.     Details: EKG sinus tachycardia with a rate of 107 as interpreted by me  Discussion of management or test interpretation with external provider(s): Case was discussed with Dr. Swartz and also with the general surgery.    Risk  Decision regarding hospitalization.      Labs Reviewed   CBC WITH AUTO DIFFERENTIAL - Abnormal       Result Value    WBC 14.5 (*)     nRBC 0.0      RBC 2.67 (*)     Hemoglobin 8.1 (*)     Hematocrit 24.2 (*)     MCV 91      MCH 30.3      MCHC 33.5      RDW 14.0      Platelets 387      Neutrophils % 71.9      Immature Granulocytes %, Automated 0.4      Lymphocytes % 21.5      Monocytes % 5.9      Eosinophils % 0.1      Basophils % 0.2      Neutrophils Absolute 10.41 (*)     Immature Granulocytes Absolute, Automated 0.06      Lymphocytes Absolute 3.12      Monocytes Absolute 0.85      Eosinophils Absolute 0.01       Basophils Absolute 0.03     COMPREHENSIVE METABOLIC PANEL - Abnormal    Glucose 93      Sodium 133 (*)     Potassium 3.5      Chloride 100      Bicarbonate 24      Anion Gap 13      Urea Nitrogen 30 (*)     Creatinine 0.64      eGFR >90      Calcium 7.9 (*)     Albumin 3.4      Alkaline Phosphatase 45      Total Protein 5.6 (*)     AST 29      Bilirubin, Total 0.2      ALT 15     AMYLASE - Abnormal    Amylase 25 (*)    DRUG SCREEN,URINE - Abnormal    Amphetamine Screen, Urine Presumptive Negative      Barbiturate Screen, Urine Presumptive Negative      Benzodiazepines Screen, Urine Presumptive Negative      Cannabinoid Screen, Urine Presumptive Negative      Cocaine Metabolite Screen, Urine Presumptive Negative      Fentanyl Screen, Urine Presumptive Positive (*)     Opiate Screen, Urine Presumptive Negative      Oxycodone Screen, Urine Presumptive Negative      PCP Screen, Urine Presumptive Negative      Methadone Screen, Urine Presumptive Negative      Narrative:     Drug screen results are presumptive and should not be used to assess   compliance with prescribed medication. Contact the performing [facility] laboratory   to add-on definitive confirmatory testing if clinically indicated.    Toxicology screening results are reported qualitatively. The concentration must   be greater than or equal to the cutoff to be reported as positive. The concentration   at which the screening test can detect an individual drug or metabolite varies.   The absence of expected drug(s) and/or drug metabolite(s) may indicate non-compliance,   inappropriate timing of specimen collection relative to drug administration, poor drug   absorption, diluted/adulterated urine, or limitations of testing. For medical purposes   only; not valid for forensic use.    Interpretive questions should be directed to the laboratory medical directors.   URINALYSIS WITH REFLEX CULTURE AND MICROSCOPIC - Abnormal    Color, Urine Colorless (*)     Appearance,  Urine Clear      Specific Gravity, Urine 1.009      pH, Urine 5.5      Protein, Urine NEGATIVE      Glucose, Urine Normal      Blood, Urine NEGATIVE      Ketones, Urine NEGATIVE      Bilirubin, Urine NEGATIVE      Urobilinogen, Urine Normal      Nitrite, Urine NEGATIVE      Leukocyte Esterase, Urine NEGATIVE     CBC - Abnormal    WBC 16.0 (*)     nRBC 0.0      RBC 2.04 (*)     Hemoglobin 6.3 (*)     Hematocrit 18.7 (*)     MCV 92      MCH 30.9      MCHC 33.7      RDW 14.3      Platelets 393     HEMOGLOBIN - Abnormal    Hemoglobin 9.0 (*)    CBC - Abnormal    WBC 6.9      nRBC 0.0      RBC 2.21 (*)     Hemoglobin 6.6 (*)     Hematocrit 19.1 (*)     MCV 86      MCH 29.9      MCHC 34.6      RDW 15.6 (*)     Platelets 217     BASIC METABOLIC PANEL - Abnormal    Glucose 79      Sodium 139      Potassium 3.4 (*)     Chloride 109 (*)     Bicarbonate 24      Anion Gap 9 (*)     Urea Nitrogen 16      Creatinine 0.64      eGFR >90      Calcium 7.5 (*)    URINALYSIS WITH REFLEX CULTURE AND MICROSCOPIC - Abnormal    Color, Urine Light-Yellow      Appearance, Urine Clear      Specific Gravity, Urine 1.017      pH, Urine 7.0      Protein, Urine NEGATIVE      Glucose, Urine Normal      Blood, Urine NEGATIVE      Ketones, Urine 60 (2+) (*)     Bilirubin, Urine NEGATIVE      Urobilinogen, Urine Normal      Nitrite, Urine NEGATIVE      Leukocyte Esterase, Urine NEGATIVE     CBC - Abnormal    WBC 8.8      nRBC 0.0      RBC 3.59 (*)     Hemoglobin 10.0 (*)     Hematocrit 29.2 (*)     MCV 81      MCH 27.9      MCHC 34.2      RDW 17.6 (*)     Platelets 254     BASIC METABOLIC PANEL - Abnormal    Glucose 95      Sodium 138      Potassium 3.7      Chloride 107      Bicarbonate 24      Anion Gap 11      Urea Nitrogen 9      Creatinine 0.70      eGFR >90      Calcium 7.8 (*)    CBC - Abnormal    WBC 7.3      nRBC 0.0      RBC 3.44 (*)     Hemoglobin 9.7 (*)     Hematocrit 29.0 (*)     MCV 84      MCH 28.2      MCHC 33.4      RDW 18.3 (*)      Platelets 268     CBC WITH AUTO DIFFERENTIAL - Abnormal    WBC 7.6      nRBC 0.0      RBC 3.33 (*)     Hemoglobin 9.2 (*)     Hematocrit 29.0 (*)     MCV 87      MCH 27.6      MCHC 31.7 (*)     RDW 18.1 (*)     Platelets 275      Neutrophils % 61.5      Immature Granulocytes %, Automated 0.3      Lymphocytes % 27.1      Monocytes % 6.7      Eosinophils % 4.1      Basophils % 0.3      Neutrophils Absolute 4.65      Immature Granulocytes Absolute, Automated 0.02      Lymphocytes Absolute 2.05      Monocytes Absolute 0.51      Eosinophils Absolute 0.31      Basophils Absolute 0.02     COMPREHENSIVE METABOLIC PANEL - Abnormal    Glucose 109 (*)     Sodium 139      Potassium 4.4      Chloride 111 (*)     Bicarbonate 25      Anion Gap 7 (*)     Urea Nitrogen 7      Creatinine 0.69      eGFR >90      Calcium 7.8 (*)     Albumin 2.9 (*)     Alkaline Phosphatase 37      Total Protein 4.6 (*)     AST 8 (*)     Bilirubin, Total 0.2      ALT 10     CBC WITH AUTO DIFFERENTIAL - Abnormal    WBC 7.0      nRBC 0.0      RBC 3.49 (*)     Hemoglobin 10.0 (*)     Hematocrit 30.6 (*)     MCV 88      MCH 28.7      MCHC 32.7      RDW 17.8 (*)     Platelets 236      Neutrophils % 56.9      Immature Granulocytes %, Automated 0.1      Lymphocytes % 30.8      Monocytes % 7.4      Eosinophils % 4.7      Basophils % 0.1      Neutrophils Absolute 4.00      Immature Granulocytes Absolute, Automated 0.01      Lymphocytes Absolute 2.17      Monocytes Absolute 0.52      Eosinophils Absolute 0.33      Basophils Absolute 0.01     COMPREHENSIVE METABOLIC PANEL - Abnormal    Glucose 98      Sodium 139      Potassium 4.8      Chloride 111 (*)     Bicarbonate 26      Anion Gap 7 (*)     Urea Nitrogen 3 (*)     Creatinine 0.60      eGFR >90      Calcium 8.3 (*)     Albumin 3.3 (*)     Alkaline Phosphatase 38      Total Protein 5.1 (*)     AST 8 (*)     Bilirubin, Total 0.3      ALT 11     CBC WITH AUTO DIFFERENTIAL - Abnormal    WBC 7.6      nRBC 0.0       RBC 3.52 (*)     Hemoglobin 10.0 (*)     Hematocrit 31.6 (*)     MCV 90      MCH 28.4      MCHC 31.6 (*)     RDW 17.6 (*)     Platelets 228      Neutrophils % 55.3      Immature Granulocytes %, Automated 0.4      Lymphocytes % 28.7      Monocytes % 8.6      Eosinophils % 6.7      Basophils % 0.3      Neutrophils Absolute 4.19      Immature Granulocytes Absolute, Automated 0.03      Lymphocytes Absolute 2.17      Monocytes Absolute 0.65      Eosinophils Absolute 0.51      Basophils Absolute 0.02     COMPREHENSIVE METABOLIC PANEL - Abnormal    Glucose 113 (*)     Sodium 137      Potassium 3.7      Chloride 106      Bicarbonate 27      Anion Gap 8 (*)     Urea Nitrogen 5 (*)     Creatinine 0.63      eGFR >90      Calcium 8.4 (*)     Albumin 3.4      Alkaline Phosphatase 39      Total Protein 5.3 (*)     AST 8 (*)     Bilirubin, Total 0.3      ALT 9     BLOOD CULTURE - Normal    Blood Culture No growth at 4 days -  FINAL REPORT     BLOOD CULTURE - Normal    Blood Culture No growth at 4 days -  FINAL REPORT     HCG, URINE, QUALITATIVE - Normal    HCG, Urine NEGATIVE     LIPASE - Normal    Lipase 49      Narrative:     Venipuncture immediately after or during the administration of Metamizole may lead to falsely low results. Testing should be performed immediately prior to Metamizole dosing.   LACTATE - Normal    Lactate 1.3      Narrative:     Venipuncture immediately after or during the administration of Metamizole may lead to falsely low results. Testing should be performed immediately  prior to Metamizole dosing.   ACUTE TOXICOLOGY PANEL, BLOOD - Normal    Acetaminophen 10.9      Salicylate  <3      Alcohol <10     MAGNESIUM - Normal    Magnesium 1.94     MAGNESIUM - Normal    Magnesium 2.06     MAGNESIUM - Normal    Magnesium 1.88     STAPHYLOCOCCUS AUREUS/MRSA COLONIZATION, CULTURE   URINALYSIS WITH REFLEX CULTURE AND MICROSCOPIC    Narrative:     The following orders were created for panel order Urinalysis with  Reflex Culture and Microscopic.  Procedure                               Abnormality         Status                     ---------                               -----------         ------                     Urinalysis with Reflex Cu...[[id number]]  Abnormal            Final result               Extra Urine Gray Tube[[id number]]                             Final result                 Please view results for these tests on the individual orders.   EXTRA URINE GRAY TUBE    Extra Tube Hold for add-ons.     TYPE AND SCREEN    ABO TYPE B      Rh TYPE POS      ANTIBODY SCREEN NEG     GASTRIN    Gastrin 13     VERAB/VERIFY ABORH    ABO TYPE B      Rh TYPE POS     URINALYSIS WITH REFLEX CULTURE AND MICROSCOPIC    Narrative:     The following orders were created for panel order Urinalysis with Reflex Culture and Microscopic.  Procedure                               Abnormality         Status                     ---------                               -----------         ------                     Urinalysis with Reflex C...[[id number]]  Abnormal            Final result               Extra Urine Gray Tube[[id number]]                            Final result                 Please view results for these tests on the individual orders.   EXTRA URINE GRAY TUBE    Extra Tube Hold for add-ons.     GASTRIN    Gastrin 23     CBC WITH AUTO DIFFERENTIAL   COMPREHENSIVE METABOLIC PANEL   MAGNESIUM   PREPARE RBC    PRODUCT CODE X4552M45      Unit Number [id number]      Unit ABO B      Unit RH NEG      XM INTEP COMP      Dispense Status TR      Blood Expiration Date 9/5/2024 11:59:00 PM EDT      PRODUCT BLOOD TYPE 1700      UNIT VOLUME 350      PRODUCT CODE I8906R61      Unit Number [id number]      Unit ABO B      Unit RH NEG      XM INTEP COMP      Dispense Status TR      Blood Expiration Date 9/6/2024 11:59:00 PM EDT      PRODUCT BLOOD TYPE 1700      UNIT VOLUME 350     PREPARE RBC    PRODUCT CODE E1225B08      Unit Number  [id number]      Unit ABO O      Unit RH POS      XM INTEP COMP      Dispense Status TR      Blood Expiration Date 8/28/2024 11:59:00 PM EDT      PRODUCT BLOOD TYPE 5100      UNIT VOLUME 350      PRODUCT CODE A7635Y90      Unit Number [id number]      Unit ABO O      Unit RH POS      XM INTEP COMP      Dispense Status TR      Blood Expiration Date 8/31/2024 11:59:00 PM EDT      PRODUCT BLOOD TYPE 5100      UNIT VOLUME 279     PREPARE RBC        Vascular US upper extremity venous duplex right         Bedside PICC Imaging   Final Result      FL upper GI single contrast w small bowel follow through   Final Result   No free intraperitoneal air has developed        Water-soluble contrast leaked out of the two duodenal bulb ulcers on   opposing sides of the bulb but no water-soluble contrast leaked   freely away from either of those two collections        Third, more distal duodenal ulcer also feeding into the larger of the   two collections (based on prior CT but not definitively on today's   fluoroscopic exam) is in a location difficult to fluoroscopically   image was not well depicted on this fluoroscopic exam        MACRO:   None        Signed by: Ozzie Duran 8/9/2024 12:11 PM   Dictation workstation:   [id number]      US right upper quadrant   Final Result   The gallbladder is contracted, which limits evaluation. No   sonographic evidence of cholelithiasis. Nonspecific heterogeneous   appearance of the liver. Correlate with LFTs.        MACRO:   None.        Signed by: Evan Finkelstein 8/6/2024 4:59 AM   Dictation workstation:   [id number]      CT angio abdomen pelvis w and or wo IV IV contrast   Final Result   1. Large clot within the gastric body and fundus measuring   approximately 6.2 cm x 4.3 cm without evidence of active bleeding   from the gastric body or fundus but there is diffuse fold thickening   indicative of gastritis.        2. Inflamed deep ulceration along the anterolateral wall of the    duodenal bulb with adjacent fat stranding, fluid, and reactive   gallbladder wall thickening, likely representing micro perforated   duodenal ulcer. No evidence of free air or abscess. Recommend   gastroenterology consultation for endoscopic assessment.        3. Additional ulcer versus duodenal diverticulum along the medial   wall.        MACRO:   None.        Signed by: Elias Ortega 8/6/2024 12:30 AM   Dictation workstation:   [id number]      CT head wo IV contrast   Final Result   CT HEAD:   1. No acute intracranial abnormality or calvarial fracture.                  CT CERVICAL SPINE:   1. No acute fracture or traumatic malalignment of the cervical spine.        MACRO:   None.        Signed by: Elias Ortega 8/6/2024 12:16 AM   Dictation workstation:   [id number]      CT cervical spine wo IV contrast   Final Result   CT HEAD:   1. No acute intracranial abnormality or calvarial fracture.                  CT CERVICAL SPINE:   1. No acute fracture or traumatic malalignment of the cervical spine.        MACRO:   None.        Signed by: Elias Ortega 8/6/2024 12:16 AM   Dictation workstation:   [id number]      XR chest 1 view   Final Result   No acute cardiopulmonary process.             MACRO:   None.        Signed by: Elias Ortega 8/6/2024 12:13 AM   Dictation workstation:   [id number]      CT abdomen pelvis w IV and oral contrast    (Results Pending)       Next Procedures     Helena Solano MD  08/13/24 0021

## 2024-08-06 NOTE — PROGRESS NOTES
--vs A- fib RVR   --patient arrived on amiodarone gtt, d/c'd  --Sinus bradycardia noted on arrival.   --currently in NSR 70  --discharging with metoprolol 25mg BID   Lisa Montemayor was assessed by a Substance Use Navigator Specialist (SUNS) at 9:05 AM     Medical History: History reviewed. No pertinent past medical history.     Psychiatric History: depression, bipolar, PTSD, and ADHD    Social History:   Social History     Socioeconomic History    Marital status: Significant Other     Spouse name: Not on file    Number of children: Not on file    Years of education: Not on file    Highest education level: Not on file   Occupational History    Not on file   Tobacco Use    Smoking status: Unknown    Smokeless tobacco: Not on file   Substance and Sexual Activity    Alcohol use: Defer    Drug use: Defer    Sexual activity: Defer   Other Topics Concern    Not on file   Social History Narrative    Not on file     Social Determinants of Health     Financial Resource Strain: Not on file   Food Insecurity: Not on file   Transportation Needs: Not on file   Physical Activity: Not on file   Stress: Not on file   Social Connections: Unknown (5/15/2024)    Received from [facility] Short Social Needs Screening - Social Connection     Would you like help with any of the following needs: food, medicine/medical supplies, transportation, loneliness, housing or utilities?: Not on file   Intimate Partner Violence: Not At Risk (4/14/2023)    Received from [facility] ED Domestic Violence     Do you feel threatened or afraid of others close to you?: No   Housing Stability: Not on file        UDS / Tox panel results:   Fentanyl    Substance(s) used: Other: Fentanyl. Amount: NA. Frequency/Route: Smokes. Last Used: a couple days ago.     Brief Summary of Assessment:   Pt is currently in FDC. Pt is looking for help and treatment in the future. SUNS told pt that SUNS will call pt in a couple weeks to see how pt is doing / see if pt is out of FDC.         Diagnosis / Diagnostic Impression:   Fentanyl use disorder     Summary / Plan:  SUNS will call pt in a couple weeks.     Patient  interested in treatment: Not at this time     In halfway     Transportation Provided: No

## 2024-08-06 NOTE — CARE PLAN
"The patient's goals for the shift include \"to be able to eat or drink something\"    The clinical goals for the shift include To help decrease the patients pain to an acceptable level    Over the shift, the patient did not make progress toward the following goals. Barriers to progression include . Recommendations to address these barriers include .    "

## 2024-08-06 NOTE — H&P
History Of Present Illness  Lisa Montemayor is a 28 y.o. female presenting with hematemesis and bleeding per rectum for 3 days.  She reported sharp epigastric abdominal pain for the past several days as well not able to eat or drink without triggering severe nausea and vomiting.  She described her hematemesis it is blood clot.  Patient also reported feeling dizzy/lightheaded.  Her abdominal pain is up to 7/10, constant, mainly epigastric.  No prior history of GI bleed.  She denied taking aspirin or other NSAID.  She smokes marijuana.  Currently she is detained at a local 's office.    ER course: Patient was awake, alert, oriented, looked ill.  She was tachycardic 107, normal blood pressure.  Her labs shows hemoglobin level 8.1, and leukocytosis 14.5 likely reactive.  There is no prior labs in the system to compare.  Her liver enzymes were normal.  Lactate was normal and has normal lipase.  CT angio of the abdomen and pelvis reported large clot within the gastric body and fundus measuring up to 6.2 cm.  There is no evidence of active bleeding.  There is diffuse fold thickening due to gastritis.  There is inflamed deep ulceration along the anterior lateral wall of the duodenal bulb with adjacent fat stranding, fluid and reactive gallbladder wall thickening likely representing microperforation of the duodenal ulcer.  Surgery was consulted.  Patient is admitted to medicine for further workup and management of GI bleed.     Past Medical History  Denies significant past medical history    Surgical History  She has no past surgical history on file.     Social History  Tobacco use, marijuana use, history of drug abuse.    Family History  Reviewed with patient, noncontributory.     Allergies  Amoxicillin and Penicillins    Review of Systems  10/10 points review of system were conducted, negative except as above.    Physical Exam  Constitutional:       Appearance: She is ill-appearing. She is not diaphoretic.   HENT:  "     Head: Normocephalic and atraumatic.      Nose: Nose normal.      Mouth/Throat:      Mouth: Mucous membranes are dry.      Pharynx: No oropharyngeal exudate.   Eyes:      Extraocular Movements: Extraocular movements intact.      Pupils: Pupils are equal, round, and reactive to light.      Comments: Pale conjunctiva   Neck:      Vascular: No carotid bruit.   Cardiovascular:      Rate and Rhythm: Regular rhythm. Tachycardia present.      Pulses: Normal pulses.      Heart sounds: No murmur heard.  Pulmonary:      Effort: No respiratory distress.      Breath sounds: No stridor. No wheezing, rhonchi or rales.   Chest:      Chest wall: No tenderness.   Abdominal:      General: There is no distension.      Palpations: There is no mass.      Tenderness: There is abdominal tenderness. There is guarding and rebound. There is no right CVA tenderness or left CVA tenderness.      Hernia: No hernia is present.   Musculoskeletal:         General: No swelling or tenderness.      Cervical back: Normal range of motion and neck supple. No rigidity or tenderness.      Right lower leg: No edema.      Left lower leg: No edema.   Lymphadenopathy:      Cervical: No cervical adenopathy.   Skin:     General: Skin is warm and dry.      Findings: No lesion or rash.   Neurological:      General: No focal deficit present.      Mental Status: She is alert and oriented to person, place, and time. Mental status is at baseline.   Psychiatric:         Mood and Affect: Mood normal.         Behavior: Behavior normal.          Last Recorded Vitals  Blood pressure 114/59, pulse (!) 110, temperature 36.9 °C (98.4 °F), temperature source Temporal, resp. rate 18, height 1.6 m (5' 3\"), weight 61.2 kg (135 lb), SpO2 98%.    Relevant Results  Scheduled medications  cefTRIAXone, 1 g, intravenous, q24h  metroNIDAZOLE, 500 mg, intravenous, q8h  pantoprazole, 40 mg, intravenous, BID      Continuous medications  lactated Ringer's, 100 mL/hr, Last Rate: 100 " mL/hr (08/06/24 0218)      PRN medications  PRN medications: morphine, ondansetron **OR** ondansetron        Results for orders placed or performed during the hospital encounter of 08/05/24 (from the past 24 hour(s))   CBC and Auto Differential   Result Value Ref Range    WBC 14.5 (H) 4.4 - 11.3 x10*3/uL    nRBC 0.0 0.0 - 0.0 /100 WBCs    RBC 2.67 (L) 4.00 - 5.20 x10*6/uL    Hemoglobin 8.1 (L) 12.0 - 16.0 g/dL    Hematocrit 24.2 (L) 36.0 - 46.0 %    MCV 91 80 - 100 fL    MCH 30.3 26.0 - 34.0 pg    MCHC 33.5 32.0 - 36.0 g/dL    RDW 14.0 11.5 - 14.5 %    Platelets 387 150 - 450 x10*3/uL    Neutrophils % 71.9 40.0 - 80.0 %    Immature Granulocytes %, Automated 0.4 0.0 - 0.9 %    Lymphocytes % 21.5 13.0 - 44.0 %    Monocytes % 5.9 2.0 - 10.0 %    Eosinophils % 0.1 0.0 - 6.0 %    Basophils % 0.2 0.0 - 2.0 %    Neutrophils Absolute 10.41 (H) 1.20 - 7.70 x10*3/uL    Immature Granulocytes Absolute, Automated 0.06 0.00 - 0.70 x10*3/uL    Lymphocytes Absolute 3.12 1.20 - 4.80 x10*3/uL    Monocytes Absolute 0.85 0.10 - 1.00 x10*3/uL    Eosinophils Absolute 0.01 0.00 - 0.70 x10*3/uL    Basophils Absolute 0.03 0.00 - 0.10 x10*3/uL   Comprehensive metabolic panel   Result Value Ref Range    Glucose 93 74 - 99 mg/dL    Sodium 133 (L) 136 - 145 mmol/L    Potassium 3.5 3.5 - 5.3 mmol/L    Chloride 100 98 - 107 mmol/L    Bicarbonate 24 21 - 32 mmol/L    Anion Gap 13 10 - 20 mmol/L    Urea Nitrogen 30 (H) 6 - 23 mg/dL    Creatinine 0.64 0.50 - 1.05 mg/dL    eGFR >90 >60 mL/min/1.73m*2    Calcium 7.9 (L) 8.6 - 10.3 mg/dL    Albumin 3.4 3.4 - 5.0 g/dL    Alkaline Phosphatase 45 33 - 110 U/L    Total Protein 5.6 (L) 6.4 - 8.2 g/dL    AST 29 9 - 39 U/L    Bilirubin, Total 0.2 0.0 - 1.2 mg/dL    ALT 15 7 - 45 U/L   Lipase   Result Value Ref Range    Lipase 49 9 - 82 U/L   Lactate   Result Value Ref Range    Lactate 1.3 0.4 - 2.0 mmol/L   Amylase   Result Value Ref Range    Amylase 25 (L) 29 - 103 U/L   Acute Toxicology Panel, Blood    Result Value Ref Range    Acetaminophen 10.9 10.0 - 30.0 ug/mL    Salicylate  <3 4 - 20 mg/dL    Alcohol <10 <=10 mg/dL   hCG, Urine, Qualitative   Result Value Ref Range    HCG, Urine NEGATIVE NEGATIVE   Drug Screen, Urine   Result Value Ref Range    Amphetamine Screen, Urine Presumptive Negative Presumptive Negative    Barbiturate Screen, Urine Presumptive Negative Presumptive Negative    Benzodiazepines Screen, Urine Presumptive Negative Presumptive Negative    Cannabinoid Screen, Urine Presumptive Negative Presumptive Negative    Cocaine Metabolite Screen, Urine Presumptive Negative Presumptive Negative    Fentanyl Screen, Urine Presumptive Positive (A) Presumptive Negative    Opiate Screen, Urine Presumptive Negative Presumptive Negative    Oxycodone Screen, Urine Presumptive Negative Presumptive Negative    PCP Screen, Urine Presumptive Negative Presumptive Negative    Methadone Screen, Urine Presumptive Negative Presumptive Negative   Urinalysis with Reflex Culture and Microscopic   Result Value Ref Range    Color, Urine Colorless (N) Light-Yellow, Yellow, Dark-Yellow    Appearance, Urine Clear Clear    Specific Gravity, Urine 1.009 1.005 - 1.035    pH, Urine 5.5 5.0, 5.5, 6.0, 6.5, 7.0, 7.5, 8.0    Protein, Urine NEGATIVE NEGATIVE, 10 (TRACE), 20 (TRACE) mg/dL    Glucose, Urine Normal Normal mg/dL    Blood, Urine NEGATIVE NEGATIVE    Ketones, Urine NEGATIVE NEGATIVE mg/dL    Bilirubin, Urine NEGATIVE NEGATIVE    Urobilinogen, Urine Normal Normal mg/dL    Nitrite, Urine NEGATIVE NEGATIVE    Leukocyte Esterase, Urine NEGATIVE NEGATIVE       CT angio abdomen pelvis w and or wo IV IV contrast    Result Date: 8/6/2024  Interpreted By:  Elias Ortega, STUDY: CT ANGIO ABDOMEN PELVIS W AND/OR WO IV IV CONTRAST;  8/5/2024 11:32 pm   INDICATION: Signs/Symptoms:GI bleed.   COMPARISON: None.   ACCESSION NUMBER(S): [id number]   ORDERING CLINICIAN: STEPHANIE MAST   TECHNIQUE: Contiguous non-contrast axial images  of the abdomen and pelvis were initially obtained.   Thin-section axial images of the abdomen and pelvis were obtained in the arterial and portal venous phase after intravenous administration of iodinated contrast. Sagittal and coronal reformatted images were provided. MIP images and 3D reconstructions were created on an independent workstation and reviewed.   FINDINGS: VASCULATURE:   ABDOMINAL AORTA: No abdominal aortic aneurysm or dissection. No significant abdominal aortic atherosclerosis.   ABDOMINAL and PELVIC ARTERIES:  No hemodynamically significant stenosis or occlusion.     CT ABDOMEN/PELVIS:   LOWER CHEST: No acute abnormality.   ABDOMINAL WALL: No significant abnormality.   LIVER: No significant parenchymal abnormality.   BILE DUCTS: No significant intrahepatic or extrahepatic dilatation.   GALLBLADDER: There is diffuse gallbladder wall edema, likely in part reactive to adjacent gastric ulcer.   PANCREAS: No significant abnormality.   SPLEEN: No significant abnormality.   ADRENALS: No significant abnormality.   KIDNEYS, URETERS, BLADDER: No significant abnormality.   REPRODUCTIVE ORGANS: No significant abnormality.   VESSELS: (See above). No additional significant abnormality.   LYMPH NODES/RETROPERITONEUM: No enlarged lymph nodes. No acute retroperitoneal abnormality.   BOWEL/MESENTERY/PERITONEUM: There is diffuse gastric fold thickening in the fundus and body of the stomach. There is a large hyperdense globular filling defect in the gastric fundus and body measuring approximately 6.2 cm x 4.3 cm consistent with clot. There is wall thickening of the gastric antrum, pylorus, and duodenal bulb associated with both mucosal hyperenhancement and sub mucosal wall thickening. Along the anterior wall of the duodenal bulb there is a deep ulceration measuring approximately 1 cm deep and 1.5 cm wide within ulcer neck of approximately 0.4 cm diameter. This ulcer projects directly into the gallbladder fossa and there  is prominent surrounding fat stranding and fluid and reactive gallbladder wall thickening. Along the medial wall the descending duodenum there is a larger collection of fluid and air measuring 3.2 cm x 1.3 cm note could relate to an additional broad ulcer or diverticulum, the latter favored. There is no evidence of active gastrointestinal bleeding. There is no free intraperitoneal air.     OSSEOUS STRUCTURES: No acute osseous abnormality.       1. Large clot within the gastric body and fundus measuring approximately 6.2 cm x 4.3 cm without evidence of active bleeding from the gastric body or fundus but there is diffuse fold thickening indicative of gastritis.   2. Inflamed deep ulceration along the anterolateral wall of the duodenal bulb with adjacent fat stranding, fluid, and reactive gallbladder wall thickening, likely representing micro perforated duodenal ulcer. No evidence of free air or abscess. Recommend gastroenterology consultation for endoscopic assessment.   3. Additional ulcer versus duodenal diverticulum along the medial wall.   MACRO: None.   Signed by: Elias Ortega 8/6/2024 12:30 AM Dictation workstation:   [id number]    CT head wo IV contrast    Result Date: 8/6/2024  Interpreted By:  Elias Ortega, STUDY: CT HEAD WO IV CONTRAST; CT CERVICAL SPINE WO IV CONTRAST;  8/5/2024 11:26 pm   INDICATION: Signs/Symptoms:Trauma   COMPARISON: None.   ACCESSION NUMBER(S): [id number]; [id number]   ORDERING CLINICIAN: STEPHANIE MAST   TECHNIQUE: Axial noncontrast CT images of head with coronal and sagittal reconstructed images. Axial noncontrast CT images of the cervical spine with coronal and sagittal reconstructed images.   FINDINGS: CT HEAD:   BRAIN PARENCHYMA:  No acute intraparenchymal hemorrhage or parenchymal evidence of acute large territory ischemic infarct. No mass-effect. Gray-white matter distinction is preserved.   VENTRICLES and EXTRA-AXIAL SPACES:  No acute extra-axial or  intraventricular hemorrhage. No effacement of cerebral sulci. Ventricles and sulci are age-concordant.   PARANASAL SINUSES/MASTOIDS:  No hemorrhage or air-fluid levels within the visualized paranasal sinuses. The mastoids are well aerated.   CALVARIUM/ORBITS:  No skull fracture. The orbits and globes are intact to the extent visualized.   EXTRACRANIAL SOFT TISSUES: No discernible hematoma.     CT CERVICAL SPINE:   PREVERTEBRAL SOFT TISSUES: Within normal limits.   CRANIOCERVICAL JUNCTION: Intact.   ALIGNMENT:  No traumatic malalignment or traumatic facet widening.   VERTEBRAE:  No acute fracture. Vertebral body heights are maintained.   SPINAL CANAL/INTERVERTEBRAL DISCS: No high-grade spinal canal stenosis. No significant disc height loss.   NEURAL FORAMINA: No significant neural foraminal stenosis.   OTHER: None.       CT HEAD: 1. No acute intracranial abnormality or calvarial fracture.       CT CERVICAL SPINE: 1. No acute fracture or traumatic malalignment of the cervical spine.   MACRO: None.   Signed by: Elias Ortega 8/6/2024 12:16 AM Dictation workstation:   [id number]    CT cervical spine wo IV contrast    Result Date: 8/6/2024  Interpreted By:  Elias Ortega, STUDY: CT HEAD WO IV CONTRAST; CT CERVICAL SPINE WO IV CONTRAST;  8/5/2024 11:26 pm   INDICATION: Signs/Symptoms:Trauma   COMPARISON: None.   ACCESSION NUMBER(S): [id number]; [id number]   ORDERING CLINICIAN: STEPHANIE MAST   TECHNIQUE: Axial noncontrast CT images of head with coronal and sagittal reconstructed images. Axial noncontrast CT images of the cervical spine with coronal and sagittal reconstructed images.   FINDINGS: CT HEAD:   BRAIN PARENCHYMA:  No acute intraparenchymal hemorrhage or parenchymal evidence of acute large territory ischemic infarct. No mass-effect. Gray-white matter distinction is preserved.   VENTRICLES and EXTRA-AXIAL SPACES:  No acute extra-axial or intraventricular hemorrhage. No effacement of cerebral sulci.  Ventricles and sulci are age-concordant.   PARANASAL SINUSES/MASTOIDS:  No hemorrhage or air-fluid levels within the visualized paranasal sinuses. The mastoids are well aerated.   CALVARIUM/ORBITS:  No skull fracture. The orbits and globes are intact to the extent visualized.   EXTRACRANIAL SOFT TISSUES: No discernible hematoma.     CT CERVICAL SPINE:   PREVERTEBRAL SOFT TISSUES: Within normal limits.   CRANIOCERVICAL JUNCTION: Intact.   ALIGNMENT:  No traumatic malalignment or traumatic facet widening.   VERTEBRAE:  No acute fracture. Vertebral body heights are maintained.   SPINAL CANAL/INTERVERTEBRAL DISCS: No high-grade spinal canal stenosis. No significant disc height loss.   NEURAL FORAMINA: No significant neural foraminal stenosis.   OTHER: None.       CT HEAD: 1. No acute intracranial abnormality or calvarial fracture.       CT CERVICAL SPINE: 1. No acute fracture or traumatic malalignment of the cervical spine.   MACRO: None.   Signed by: Elias Ortega 8/6/2024 12:16 AM Dictation workstation:   [id number]    XR chest 1 view    Result Date: 8/6/2024  Interpreted By:  Elias Ortega, STUDY: XR CHEST 1 VIEW;  8/5/2024 11:14 pm   INDICATION: Signs/Symptoms:Chest pain.   COMPARISON: 07/26/2014   ACCESSION NUMBER(S): [id number]   ORDERING CLINICIAN: STEPHANIE MAST   FINDINGS:     The cardiomediastinal silhouette and pulmonary vasculature are within normal limits. No consolidation, pleural effusion or pneumothorax.         No acute cardiopulmonary process.     MACRO: None.   Signed by: Elias Ortega 8/6/2024 12:13 AM Dictation workstation:   [id number]       Assessment/Plan   Principal Problem:    GI bleeding  Active Problems:    Duodenal ulcer    Perforated ulcer of intestine (Multi)    Acute blood loss anemia    Acute gastritis with hemorrhage    -Patient presented with hematemesis and bleeding per rectum.  Source of bleeding is likely gastritis/gastric ulcers and large duodenal ulcer.  There is  possible perforation.  -Hemoglobin level is 8.1.  Will repeat the level.  -Transfused 2 unit PRBC.  -N.p.o. for now.  -Check gastrin level in case this is Zollinger-Thompson syndrome especially with gastric fold thickening/hypertrophy, diffuse gastritis, and duodenal ulcer.  -She received pantoprazole in the ER, will continue with IV pantoprazole 40 mg twice daily.  -General Surgery consult for suspected perforation.  -Empiric antibiotic coverage with Flagyl and Rocephin.  -GI consult for EGD.  -Telemetry monitoring.  -Continue with IV fluid hydration.  -Monitor serum electrolytes and correct as indicated.  -Zofran for nausea control.  -Optimize pain control      Miko Mtz MD

## 2024-08-06 NOTE — CONSULTS
Consults  Consulted by Dr. Mtz  General Surgery Consult Note  Patient: Lisa Montemayor  Unit/Bed: AC17/AC17  YOB: 1995  MRN: [id number]  Acct: [id number]   Admitting Diagnosis: GI bleeding [K92.2]  Date:  8/5/2024  Hospital Day: 0  Attending: Helena Solano MD;Soto*    Complaint:  Chief Complaint   Patient presents with    Black or Bloody Stool      History of Present Illness:  28-year-old female with history of asthma, depression, bipolar disorder, ADHD, and PTSD [facility] ER from prison for rectal bleeding and hematemesis.  She states 5 days ago she had chest pain when she was arrested, then the day after she developed lower abdominal pain, and then 2 to 3 days ago she started having the rectal bleeding and hematemesis.  She states her bowel movements are liquid with dark and bright red blood.  She states she is vomiting blood clots.  She denies history of any similar episodes of bleeding.  She also complains of fever, chills, vision changes x 1 month, shortness of breath, and generalized weakness.  She states she smokes fentanyl and last used on Tuesday or Wednesday (6 or 7 days ago) and has been withdrawing.  She denies history of abdominal surgeries.  In the ER, she was initially tachycardic with heart rate of 107, WBC 14.5, hemoglobin 8.1.  CT angio abdomen and pelvis showed a large clot in the gastric body and fundus without evidence of active bleeding, diffuse fold thickening indicative of gastritis, ulceration along duodenal bulb with fat stranding, fluid, and reactive gallbladder wall thickening likely representing microperforated duodenal ulcer, and collection of fluid and air along duodenum that could relate to additional ulcer or diverticulum.  No free intraperitoneal air.  She is admitted for GI bleeding. General surgery was consulted for possible perforation and gallbladder wall thickening.  Allergies:  Allergies   Allergen Reactions    Amoxicillin Hives    Penicillins Hives       PMHx:  Asthma, depression, bipolar disorder, ADHD, and PTSD  PSHx:  None  Social Hx:  Social History     Socioeconomic History    Marital status: Significant Other   Tobacco Use    Smoking status: Unknown   Substance and Sexual Activity    Alcohol use: Defer    Drug use: Defer    Sexual activity: Defer     Social Determinants of Health      [facility] - Social Connection   Intimate Partner Violence: Not At Risk (4/14/2023)    [facility] Domestic Violence     Do you feel threatened or afraid of others close to you?: No     Family Hx:  No family history on file.  Review of Systems:   Review of Systems   Constitutional:  Positive for chills and fever. Negative for unexpected weight change.   Eyes:  Positive for visual disturbance.   Respiratory:  Positive for shortness of breath.    Cardiovascular:  Positive for chest pain. Negative for leg swelling.   Gastrointestinal:  Positive for abdominal pain, blood in stool and vomiting (hematemesis).   Genitourinary:  Negative for dysuria.   Musculoskeletal:  Negative for myalgias.   Neurological:  Positive for weakness (generalized).   All other systems reviewed and are negative.    Physical Examination:    Visit Vitals  /59 (BP Location: Right arm, Patient Position: Lying)   Pulse (!) 110   Temp 36.9 °C (98.4 °F) (Temporal)   Resp 18      Physical Exam  Constitutional:       General: She is in acute distress.      Comments: Appears uncomfortable   HENT:      Head: Normocephalic and atraumatic.      Mouth/Throat:      Mouth: Mucous membranes are moist.   Eyes:      Conjunctiva/sclera: Conjunctivae normal.   Pulmonary:      Effort: Pulmonary effort is normal. No respiratory distress.   Abdominal:      General: There is no distension.      Palpations: Abdomen is soft.      Tenderness: There is abdominal tenderness (diffusely, to light palpation).   Musculoskeletal:         General: No  "swelling.   Skin:     General: Skin is warm and dry.   Neurological:      Mental Status: She is alert.   Psychiatric:         Mood and Affect: Mood normal.         Behavior: Behavior normal.       LABS:  CBC:   Lab Results   Component Value Date    WBC 14.5 (H) 08/05/2024    RBC 2.67 (L) 08/05/2024    HGB 8.1 (L) 08/05/2024    HCT 24.2 (L) 08/05/2024    MCV 91 08/05/2024    MCH 30.3 08/05/2024    MCHC 33.5 08/05/2024    RDW 14.0 08/05/2024     08/05/2024     CBC with Differential:    Lab Results   Component Value Date    WBC 14.5 (H) 08/05/2024    RBC 2.67 (L) 08/05/2024    HGB 8.1 (L) 08/05/2024    HCT 24.2 (L) 08/05/2024     08/05/2024    MCV 91 08/05/2024    MCH 30.3 08/05/2024    MCHC 33.5 08/05/2024    RDW 14.0 08/05/2024    NRBC 0.0 08/05/2024    LYMPHOPCT 21.5 08/05/2024    MONOPCT 5.9 08/05/2024    EOSPCT 0.1 08/05/2024    BASOPCT 0.2 08/05/2024    MONOSABS 0.85 08/05/2024    LYMPHSABS 3.12 08/05/2024    EOSABS 0.01 08/05/2024    BASOSABS 0.03 08/05/2024     CMP:    Lab Results   Component Value Date     (L) 08/05/2024    K 3.5 08/05/2024     08/05/2024    CO2 24 08/05/2024    BUN 30 (H) 08/05/2024    CREATININE 0.64 08/05/2024    GLUCOSE 93 08/05/2024    PROT 5.6 (L) 08/05/2024    CALCIUM 7.9 (L) 08/05/2024    BILITOT 0.2 08/05/2024    ALKPHOS 45 08/05/2024    AST 29 08/05/2024    ALT 15 08/05/2024     BMP:    Lab Results   Component Value Date     (L) 08/05/2024    K 3.5 08/05/2024     08/05/2024    CO2 24 08/05/2024    BUN 30 (H) 08/05/2024    CREATININE 0.64 08/05/2024    CALCIUM 7.9 (L) 08/05/2024    GLUCOSE 93 08/05/2024     Magnesium:No results found for: \"MG\"  Troponin:  No results found for: \"TROPHS\"  Lipid Panel:  No results found for: \"HDL\", \"CHHDL\", \"VLDL\", \"TRIG\", \"NHDL\"   Current Medications:    Current Facility-Administered Medications:     lactated Ringer's infusion, 100 mL/hr, intravenous, Continuous, Miko Mtz MD, Last Rate: 100 mL/hr at 08/06/24 " 0218, 100 mL/hr at 08/06/24 0218    ondansetron (Zofran) tablet 4 mg, 4 mg, oral, q8h PRN **OR** ondansetron (Zofran) injection 4 mg, 4 mg, intravenous, q8h PRN, Miko Mtz MD  No current outpatient medications on file.  CT angio abdomen pelvis w and or wo IV IV contrast    Result Date: 8/6/2024  Interpreted By:  Elias Ortega, STUDY: CT ANGIO ABDOMEN PELVIS W AND/OR WO IV IV CONTRAST;  8/5/2024 11:32 pm   INDICATION: Signs/Symptoms:GI bleed.   COMPARISON: None.   ACCESSION NUMBER(S): [id number]   ORDERING CLINICIAN: STEPHANIE MAST   TECHNIQUE: Contiguous non-contrast axial images of the abdomen and pelvis were initially obtained.   Thin-section axial images of the abdomen and pelvis were obtained in the arterial and portal venous phase after intravenous administration of iodinated contrast. Sagittal and coronal reformatted images were provided. MIP images and 3D reconstructions were created on an independent workstation and reviewed.   FINDINGS: VASCULATURE:   ABDOMINAL AORTA: No abdominal aortic aneurysm or dissection. No significant abdominal aortic atherosclerosis.   ABDOMINAL and PELVIC ARTERIES:  No hemodynamically significant stenosis or occlusion.     CT ABDOMEN/PELVIS:   LOWER CHEST: No acute abnormality.   ABDOMINAL WALL: No significant abnormality.   LIVER: No significant parenchymal abnormality.   BILE DUCTS: No significant intrahepatic or extrahepatic dilatation.   GALLBLADDER: There is diffuse gallbladder wall edema, likely in part reactive to adjacent gastric ulcer.   PANCREAS: No significant abnormality.   SPLEEN: No significant abnormality.   ADRENALS: No significant abnormality.   KIDNEYS, URETERS, BLADDER: No significant abnormality.   REPRODUCTIVE ORGANS: No significant abnormality.   VESSELS: (See above). No additional significant abnormality.   LYMPH NODES/RETROPERITONEUM: No enlarged lymph nodes. No acute retroperitoneal abnormality.   BOWEL/MESENTERY/PERITONEUM: There is diffuse  gastric fold thickening in the fundus and body of the stomach. There is a large hyperdense globular filling defect in the gastric fundus and body measuring approximately 6.2 cm x 4.3 cm consistent with clot. There is wall thickening of the gastric antrum, pylorus, and duodenal bulb associated with both mucosal hyperenhancement and sub mucosal wall thickening. Along the anterior wall of the duodenal bulb there is a deep ulceration measuring approximately 1 cm deep and 1.5 cm wide within ulcer neck of approximately 0.4 cm diameter. This ulcer projects directly into the gallbladder fossa and there is prominent surrounding fat stranding and fluid and reactive gallbladder wall thickening. Along the medial wall the descending duodenum there is a larger collection of fluid and air measuring 3.2 cm x 1.3 cm note could relate to an additional broad ulcer or diverticulum, the latter favored. There is no evidence of active gastrointestinal bleeding. There is no free intraperitoneal air.     OSSEOUS STRUCTURES: No acute osseous abnormality.       1. Large clot within the gastric body and fundus measuring approximately 6.2 cm x 4.3 cm without evidence of active bleeding from the gastric body or fundus but there is diffuse fold thickening indicative of gastritis.   2. Inflamed deep ulceration along the anterolateral wall of the duodenal bulb with adjacent fat stranding, fluid, and reactive gallbladder wall thickening, likely representing micro perforated duodenal ulcer. No evidence of free air or abscess. Recommend gastroenterology consultation for endoscopic assessment.   3. Additional ulcer versus duodenal diverticulum along the medial wall.   MACRO: None.   Signed by: Elias Ortega 8/6/2024 12:30 AM Dictation workstation:   [id number]    CT head wo IV contrast    Result Date: 8/6/2024  Interpreted By:  Elias Ortega, STUDY: CT HEAD WO IV CONTRAST; CT CERVICAL SPINE WO IV CONTRAST;  8/5/2024 11:26 pm   INDICATION:  Signs/Symptoms:Trauma   COMPARISON: None.   ACCESSION NUMBER(S): [id number]; [id number]   ORDERING CLINICIAN: STEPHANIE MAST   TECHNIQUE: Axial noncontrast CT images of head with coronal and sagittal reconstructed images. Axial noncontrast CT images of the cervical spine with coronal and sagittal reconstructed images.   FINDINGS: CT HEAD:   BRAIN PARENCHYMA:  No acute intraparenchymal hemorrhage or parenchymal evidence of acute large territory ischemic infarct. No mass-effect. Gray-white matter distinction is preserved.   VENTRICLES and EXTRA-AXIAL SPACES:  No acute extra-axial or intraventricular hemorrhage. No effacement of cerebral sulci. Ventricles and sulci are age-concordant.   PARANASAL SINUSES/MASTOIDS:  No hemorrhage or air-fluid levels within the visualized paranasal sinuses. The mastoids are well aerated.   CALVARIUM/ORBITS:  No skull fracture. The orbits and globes are intact to the extent visualized.   EXTRACRANIAL SOFT TISSUES: No discernible hematoma.     CT CERVICAL SPINE:   PREVERTEBRAL SOFT TISSUES: Within normal limits.   CRANIOCERVICAL JUNCTION: Intact.   ALIGNMENT:  No traumatic malalignment or traumatic facet widening.   VERTEBRAE:  No acute fracture. Vertebral body heights are maintained.   SPINAL CANAL/INTERVERTEBRAL DISCS: No high-grade spinal canal stenosis. No significant disc height loss.   NEURAL FORAMINA: No significant neural foraminal stenosis.   OTHER: None.       CT HEAD: 1. No acute intracranial abnormality or calvarial fracture.       CT CERVICAL SPINE: 1. No acute fracture or traumatic malalignment of the cervical spine.   MACRO: None.   Signed by: Elias Ortega 8/6/2024 12:16 AM Dictation workstation:   [id number]    CT cervical spine wo IV contrast    Result Date: 8/6/2024  Interpreted By:  Elias Ortega, STUDY: CT HEAD WO IV CONTRAST; CT CERVICAL SPINE WO IV CONTRAST;  8/5/2024 11:26 pm   INDICATION: Signs/Symptoms:Trauma   COMPARISON: None.   ACCESSION  NUMBER(S): [id number]; [id number]   ORDERING CLINICIAN: STEPHANIE MAST   TECHNIQUE: Axial noncontrast CT images of head with coronal and sagittal reconstructed images. Axial noncontrast CT images of the cervical spine with coronal and sagittal reconstructed images.   FINDINGS: CT HEAD:   BRAIN PARENCHYMA:  No acute intraparenchymal hemorrhage or parenchymal evidence of acute large territory ischemic infarct. No mass-effect. Gray-white matter distinction is preserved.   VENTRICLES and EXTRA-AXIAL SPACES:  No acute extra-axial or intraventricular hemorrhage. No effacement of cerebral sulci. Ventricles and sulci are age-concordant.   PARANASAL SINUSES/MASTOIDS:  No hemorrhage or air-fluid levels within the visualized paranasal sinuses. The mastoids are well aerated.   CALVARIUM/ORBITS:  No skull fracture. The orbits and globes are intact to the extent visualized.   EXTRACRANIAL SOFT TISSUES: No discernible hematoma.     CT CERVICAL SPINE:   PREVERTEBRAL SOFT TISSUES: Within normal limits.   CRANIOCERVICAL JUNCTION: Intact.   ALIGNMENT:  No traumatic malalignment or traumatic facet widening.   VERTEBRAE:  No acute fracture. Vertebral body heights are maintained.   SPINAL CANAL/INTERVERTEBRAL DISCS: No high-grade spinal canal stenosis. No significant disc height loss.   NEURAL FORAMINA: No significant neural foraminal stenosis.   OTHER: None.       CT HEAD: 1. No acute intracranial abnormality or calvarial fracture.       CT CERVICAL SPINE: 1. No acute fracture or traumatic malalignment of the cervical spine.   MACRO: None.   Signed by: Elias Ortega 8/6/2024 12:16 AM Dictation workstation:   [id number]    XR chest 1 view    Result Date: 8/6/2024  Interpreted By:  Elias Ortega, STUDY: XR CHEST 1 VIEW;  8/5/2024 11:14 pm   INDICATION: Signs/Symptoms:Chest pain.   COMPARISON: 07/26/2014   ACCESSION NUMBER(S): [id number]   ORDERING CLINICIAN: STEPHANIE MAST   FINDINGS:     The cardiomediastinal silhouette  and pulmonary vasculature are within normal limits. No consolidation, pleural effusion or pneumothorax.         No acute cardiopulmonary process.     MACRO: None.   Signed by: Elias Ortega 8/6/2024 12:13 AM Dictation workstation:   [id number]       Assessment:    GI bleeding  Abdominal pain    28-year-old female with history of asthma presented to the ER from custodial for rectal bleeding and hematemesis.  She states 5 days ago she had chest pain when she was arrested, then the day after she developed lower abdominal pain, and then 2 to 3 days ago she started having the rectal bleeding and hematemesis.  She states her bowel movements are liquid with dark and bright red blood.  She states she is vomiting blood clots.  She also complains of fever, chills, vision changes x 1 month, shortness of breath, and generalized weakness.  She states she smokes fentanyl and last used on Tuesday or Wednesday (6 or 7 days ago) and has been withdrawing.  She denies history of abdominal surgeries.  In the ER, she was initially tachycardic with heart rate of 107, WBC 14.5, hemoglobin 8.1.  CT angio abdomen and pelvis showed a large clot in the gastric body and fundus without evidence of active bleeding, diffuse fold thickening indicative of gastritis, ulceration along duodenal bulb with fat stranding, fluid, and reactive gallbladder wall thickening likely representing microperforated duodenal ulcer, and collection of fluid and air along duodenum that could relate to additional ulcer or diverticulum.  No free intraperitoneal air.  She is admitted for GI bleeding. General surgery was consulted for possible perforation and gallbladder wall thickening.    Plan:  -RUQ US ordered STAT to further evaluate the gallbladder  -Strict NPO x 72 hours  -Recommend IV Vancomycin and Zosyn for broad-spectrum antibiotic coverage  -GI is consulted  -Pain control  -Nausea control  -DVT prophylaxis-per primary team  -Continue care per primary team      Further recommendations per Dr. Escobar     Time spent  60  minutes obtaining labs, imaging, recommendations, interview, assessment, examination, medication review/ordering, and EMR review.    Plan of care was discussed extensively with patient. Patient verbalized understanding through teach back method. All questions and concerns addressed upon examination.     Of note, this documentation is completed using the Dragon Dictation system (voice recognition software). There may be spelling and/or grammatical errors that were not corrected prior to final submission.    Electronically signed by Amelie Reid PA-C on 8/6/2024 at 3:43 AM

## 2024-08-07 LAB
ANION GAP SERPL CALC-SCNC: 9 MMOL/L (ref 10–20)
APPEARANCE UR: CLEAR
ATRIAL RATE: 97 BPM
BILIRUB UR STRIP.AUTO-MCNC: NEGATIVE MG/DL
BUN SERPL-MCNC: 16 MG/DL (ref 6–23)
CALCIUM SERPL-MCNC: 7.5 MG/DL (ref 8.6–10.3)
CHLORIDE SERPL-SCNC: 109 MMOL/L (ref 98–107)
CO2 SERPL-SCNC: 24 MMOL/L (ref 21–32)
COLOR UR: ABNORMAL
CREAT SERPL-MCNC: 0.64 MG/DL (ref 0.5–1.05)
EGFRCR SERPLBLD CKD-EPI 2021: >90 ML/MIN/1.73M*2
ERYTHROCYTE [DISTWIDTH] IN BLOOD BY AUTOMATED COUNT: 15.6 % (ref 11.5–14.5)
GASTRIN SERPL-MCNC: 13 PG/ML (ref 0–100)
GLUCOSE SERPL-MCNC: 79 MG/DL (ref 74–99)
GLUCOSE UR STRIP.AUTO-MCNC: NORMAL MG/DL
HCT VFR BLD AUTO: 19.1 % (ref 36–46)
HGB BLD-MCNC: 6.6 G/DL (ref 12–16)
HOLD SPECIMEN: NORMAL
HOLD SPECIMEN: NORMAL
KETONES UR STRIP.AUTO-MCNC: ABNORMAL MG/DL
LEUKOCYTE ESTERASE UR QL STRIP.AUTO: NEGATIVE
MCH RBC QN AUTO: 29.9 PG (ref 26–34)
MCHC RBC AUTO-ENTMCNC: 34.6 G/DL (ref 32–36)
MCV RBC AUTO: 86 FL (ref 80–100)
NITRITE UR QL STRIP.AUTO: NEGATIVE
NRBC BLD-RTO: 0 /100 WBCS (ref 0–0)
P AXIS: 68 DEGREES
P OFFSET: 202 MS
P ONSET: 163 MS
PH UR STRIP.AUTO: 7 [PH]
PLATELET # BLD AUTO: 217 X10*3/UL (ref 150–450)
POTASSIUM SERPL-SCNC: 3.4 MMOL/L (ref 3.5–5.3)
PR INTERVAL: 124 MS
PROT UR STRIP.AUTO-MCNC: NEGATIVE MG/DL
Q ONSET: 225 MS
QRS COUNT: 15 BEATS
QRS DURATION: 72 MS
QT INTERVAL: 400 MS
QTC CALCULATION(BAZETT): 508 MS
QTC FREDERICIA: 469 MS
R AXIS: 83 DEGREES
RBC # BLD AUTO: 2.21 X10*6/UL (ref 4–5.2)
RBC # UR STRIP.AUTO: NEGATIVE /UL
SODIUM SERPL-SCNC: 139 MMOL/L (ref 136–145)
SP GR UR STRIP.AUTO: 1.02
T AXIS: 62 DEGREES
T OFFSET: 425 MS
UROBILINOGEN UR STRIP.AUTO-MCNC: NORMAL MG/DL
VENTRICULAR RATE: 97 BPM
WBC # BLD AUTO: 6.9 X10*3/UL (ref 4.4–11.3)

## 2024-08-07 PROCEDURE — 36415 COLL VENOUS BLD VENIPUNCTURE: CPT | Performed by: STUDENT IN AN ORGANIZED HEALTH CARE EDUCATION/TRAINING PROGRAM

## 2024-08-07 PROCEDURE — 81003 URINALYSIS AUTO W/O SCOPE: CPT | Performed by: STUDENT IN AN ORGANIZED HEALTH CARE EDUCATION/TRAINING PROGRAM

## 2024-08-07 PROCEDURE — 99232 SBSQ HOSP IP/OBS MODERATE 35: CPT | Performed by: STUDENT IN AN ORGANIZED HEALTH CARE EDUCATION/TRAINING PROGRAM

## 2024-08-07 PROCEDURE — 2500000004 HC RX 250 GENERAL PHARMACY W/ HCPCS (ALT 636 FOR OP/ED): Performed by: INTERNAL MEDICINE

## 2024-08-07 PROCEDURE — 85027 COMPLETE CBC AUTOMATED: CPT | Performed by: STUDENT IN AN ORGANIZED HEALTH CARE EDUCATION/TRAINING PROGRAM

## 2024-08-07 PROCEDURE — 99232 SBSQ HOSP IP/OBS MODERATE 35: CPT | Performed by: SURGERY

## 2024-08-07 PROCEDURE — C9113 INJ PANTOPRAZOLE SODIUM, VIA: HCPCS | Performed by: INTERNAL MEDICINE

## 2024-08-07 PROCEDURE — 1200000002 HC GENERAL ROOM WITH TELEMETRY DAILY

## 2024-08-07 PROCEDURE — 2500000004 HC RX 250 GENERAL PHARMACY W/ HCPCS (ALT 636 FOR OP/ED): Performed by: STUDENT IN AN ORGANIZED HEALTH CARE EDUCATION/TRAINING PROGRAM

## 2024-08-07 PROCEDURE — P9016 RBC LEUKOCYTES REDUCED: HCPCS

## 2024-08-07 PROCEDURE — 80048 BASIC METABOLIC PNL TOTAL CA: CPT | Performed by: STUDENT IN AN ORGANIZED HEALTH CARE EDUCATION/TRAINING PROGRAM

## 2024-08-07 PROCEDURE — P9040 RBC LEUKOREDUCED IRRADIATED: HCPCS

## 2024-08-07 PROCEDURE — 36430 TRANSFUSION BLD/BLD COMPNT: CPT

## 2024-08-07 PROCEDURE — 87040 BLOOD CULTURE FOR BACTERIA: CPT | Mod: ELYLAB | Performed by: STUDENT IN AN ORGANIZED HEALTH CARE EDUCATION/TRAINING PROGRAM

## 2024-08-07 RX ORDER — HYDROMORPHONE HYDROCHLORIDE 1 MG/ML
0.6 INJECTION, SOLUTION INTRAMUSCULAR; INTRAVENOUS; SUBCUTANEOUS EVERY 4 HOURS PRN
Status: DISCONTINUED | OUTPATIENT
Start: 2024-08-07 | End: 2024-08-10

## 2024-08-07 RX ORDER — LORAZEPAM 2 MG/ML
1 INJECTION INTRAMUSCULAR EVERY 8 HOURS PRN
Status: DISCONTINUED | OUTPATIENT
Start: 2024-08-07 | End: 2024-08-07

## 2024-08-07 RX ORDER — LORAZEPAM 2 MG/ML
1 INJECTION INTRAMUSCULAR EVERY 6 HOURS PRN
Status: DISPENSED | OUTPATIENT
Start: 2024-08-07

## 2024-08-07 RX ORDER — SODIUM CHLORIDE AND POTASSIUM CHLORIDE 150; 900 MG/100ML; MG/100ML
75 INJECTION, SOLUTION INTRAVENOUS CONTINUOUS
Status: DISCONTINUED | OUTPATIENT
Start: 2024-08-07 | End: 2024-08-10

## 2024-08-07 RX ORDER — DIPHENHYDRAMINE HYDROCHLORIDE 50 MG/ML
25 INJECTION INTRAMUSCULAR; INTRAVENOUS EVERY 6 HOURS PRN
Status: DISPENSED | OUTPATIENT
Start: 2024-08-07

## 2024-08-07 ASSESSMENT — PAIN DESCRIPTION - LOCATION
LOCATION: ABDOMEN

## 2024-08-07 ASSESSMENT — PAIN SCALES - GENERAL
PAINLEVEL_OUTOF10: 10 - WORST POSSIBLE PAIN
PAINLEVEL_OUTOF10: 8
PAINLEVEL_OUTOF10: 10 - WORST POSSIBLE PAIN
PAINLEVEL_OUTOF10: 9
PAINLEVEL_OUTOF10: 8

## 2024-08-07 ASSESSMENT — PAIN - FUNCTIONAL ASSESSMENT
PAIN_FUNCTIONAL_ASSESSMENT: 0-10

## 2024-08-07 ASSESSMENT — COGNITIVE AND FUNCTIONAL STATUS - GENERAL
DAILY ACTIVITIY SCORE: 24
MOBILITY SCORE: 24

## 2024-08-07 ASSESSMENT — PAIN DESCRIPTION - DESCRIPTORS
DESCRIPTORS: CRAMPING;SHARP
DESCRIPTORS: CRAMPING;SHARP

## 2024-08-07 NOTE — PROGRESS NOTES
Pt sent to ER from [facility] with GI Bleed. GI & surgery on consult. Per surgeon note; 2 units RBC ord. Per GI, due to concern for microperforation, will not perform EGD due to risk to reopen perforation. As a result, will either need angioembolization, which is not done at this facility, or surgery. Surgeon to discuss with IR . If continues to bleed, may need to go to [facility] for further care. Care transitions team to follow for case progression & address needs as identified.

## 2024-08-07 NOTE — CARE PLAN
"The patient's goals for the shift include \"to be able to eat or drink something\"    The clinical goals for the shift include Patient will report pain level of 3/10 or less with use of PRN pain medication    Problem: Skin  Goal: Prevent/manage excess moisture  8/7/2024 1117 by Oksana Morales RN  Outcome: Progressing  8/7/2024 1115 by Oksana Morales RN  Outcome: Progressing  Flowsheets (Taken 8/7/2024 1115)  Prevent/manage excess moisture:   Monitor for/manage infection if present   Moisturize dry skin  8/7/2024 1114 by Oksana Morales RN  Outcome: Progressing  Flowsheets (Taken 8/7/2024 1114)  Prevent/manage excess moisture:   Monitor for/manage infection if present   Moisturize dry skin  Goal: Prevent/minimize sheer/friction injuries  8/7/2024 1117 by Oksana Morales RN  Outcome: Progressing  8/7/2024 1115 by Oksana Morales RN  Outcome: Progressing  8/7/2024 1114 by Oksana Morales RN  Outcome: Progressing  Flowsheets (Taken 8/7/2024 1114)  Prevent/minimize sheer/friction injuries:   Increase activity/out of bed for meals   Use pull sheet   HOB 30 degrees or less   Turn/reposition every 2 hours/use positioning/transfer devices     Problem: Pain - Adult  Goal: Verbalizes/displays adequate comfort level or baseline comfort level  8/7/2024 1117 by Oksana Morales RN  Outcome: Progressing  8/7/2024 1115 by Oksana Morales RN  Outcome: Progressing  8/7/2024 1114 by Oksana Morales RN  Outcome: Progressing     Problem: Safety - Adult  Goal: Free from fall injury  8/7/2024 1117 by Oksana Morales RN  Outcome: Progressing  8/7/2024 1115 by Oksana Morales RN  Outcome: Progressing  8/7/2024 1114 by Oksana Morales RN  Outcome: Progressing     "

## 2024-08-07 NOTE — CARE PLAN
"The patient's goals for the shift include \"to be able to eat or drink something\"    The clinical goals for the shift include patient will rate 3/10 pain by the end of shift        Problem: Skin  Goal: Prevent/manage excess moisture  Outcome: Progressing  Flowsheets (Taken 8/7/2024 1114)  Prevent/manage excess moisture:   Monitor for/manage infection if present   Moisturize dry skin  Goal: Prevent/minimize sheer/friction injuries  Outcome: Progressing  Flowsheets (Taken 8/7/2024 1114)  Prevent/minimize sheer/friction injuries:   Increase activity/out of bed for meals   Use pull sheet   HOB 30 degrees or less   Turn/reposition every 2 hours/use positioning/transfer devices     Problem: Pain - Adult  Goal: Verbalizes/displays adequate comfort level or baseline comfort level  Outcome: Progressing     "

## 2024-08-07 NOTE — CARE PLAN
"The patient's goals for the shift include \"to be able to eat or drink something\"    The clinical goals for the shift include Patient will report pain level of 3/10 or less with use of PRN pain medication    Over the shift, the patient did not make progress toward the following goals. Barriers to progression include none. Recommendations to address these barriers include none.    "

## 2024-08-07 NOTE — PROGRESS NOTES
Lisa Montemayor is a 28 y.o. female on day 1 of admission presenting with GI bleeding.      Subjective   Patient is still tachycardic, developed fever, fever workup was ordered  General Surgery following, possible need for angioembolization versus surgery, patient might need to be transferred to [facility] for higher level of care    Objective     Last Recorded Vitals  /70   Pulse 105   Temp 37.3 °C (99.1 °F) (Temporal)   Resp 18   Wt 57.7 kg (127 lb 3.3 oz)   SpO2 99%   Intake/Output last 3 Shifts:    Intake/Output Summary (Last 24 hours) at 8/7/2024 1356  Last data filed at 8/7/2024 1108  Gross per 24 hour   Intake 2588.33 ml   Output --   Net 2588.33 ml       Admission Weight  Weight: 61.2 kg (135 lb) (08/05/24 2233)    Daily Weight  08/06/24 : 57.7 kg (127 lb 3.3 oz)    Image Results  ECG 12 lead  Normal sinus rhythm  Prolonged QT  Abnormal ECG  When compared with ECG of 06-AUG-2024 03:40,  Vent. rate has decreased BY  84 BPM  ST no longer depressed in Inferior leads      Physical Exam  General: Well-developed ill female, in no acute distress  HEENT: AT, NC, no JVD, no lymphadenopathy, neck supple  Lungs: Clear, no wheezing, no crackles  Cardiac: Tachycardic, no murmur, no gallop  Abdomen: epigastric tenderness noted, no distention, diminished bowel sounds  Extremities: No deformity, no edema, pulses intact, ROM intact  Neurological: Alert awake oriented x3, sensation intact, clear speech, tearful    Assessment/Plan      Principal Problem:    GI bleeding  Active Problems:    Duodenal ulcer    Perforated ulcer of intestine (Multi)    Acute blood loss anemia    Acute gastritis with hemorrhage    Lisa Montemayor is a 28 y.o. female with history of drug abuse, who was admitted for the mgmt of upper GI bleed, severe abdominal pain, duodenal microperforation, gastric clots, acute anemia     Telemetry monitor  Strict n.p.o., IV fluid hydration, antiemetic, IV Protonix  Avoid NSAID  Monitor CBC, transfuse to  keep hemoglobin >7  General Surgery and GI both following  No plan for EGD due to worsening of microperforation  General Surgery will discuss the case with IR, angioembolization versus surgery, might need transfer to [facility] for higher level of care  Will continue with IV Rocephin and Flagyl for 72 hours  Patient had an episode of fever, fever workup was ordered, UA negative for infection, pending blood cultures  Hemoglobin dropped to 6.6 today, plan for 2 units of PRBC transfusion  Monitor CBC, transfuse to keep hemoglobin>7  VTE prophylaxis: None, high risk of bleeding  Disposition: TBD  I tried to call the father however he was not available, will try later       Lito Shah MD

## 2024-08-07 NOTE — CARE PLAN
"The patient's goals for the shift include \"to be able to eat or drink something\"    The clinical goals for the shift include pain control    "

## 2024-08-07 NOTE — PROGRESS NOTES
"Lisa Montemayor is a 28 y.o. female on day 1 of admission presenting with GI bleeding.    Subjective   Continued to have hematachezia overnight, Hgb 9.0->6.6, receiving 2 units PRBC this am, pain improved, WBC normalized       Objective     Physical Exam  Gen: NAD, Aax3  Abd; Soft, ND, TTP in RUQ  Last Recorded Vitals  Blood pressure 130/63, pulse 99, temperature (!) 38 °C (100.4 °F), temperature source Temporal, resp. rate 18, height 1.6 m (5' 3\"), weight 57.7 kg (127 lb 3.3 oz), SpO2 99%.  Intake/Output last 3 Shifts:  I/O last 3 completed shifts:  In: 2925 (50.7 mL/kg) [I.V.:1675 (29 mL/kg); Blood:1050; IV Piggyback:200]  Out: - (0 mL/kg)   Weight: 57.7 kg     Relevant Results                Component      Latest Ref Rng 8/6/2024   Color, Urine      Light-Yellow, Yellow, Dark-Yellow     Appearance, Urine      Clear     Specific Gravity, Urine      1.005 - 1.035     pH, Urine      5.0, 5.5, 6.0, 6.5, 7.0, 7.5, 8.0     Protein, Urine      NEGATIVE, 10 (TRACE), 20 (TRACE) mg/dL    Glucose, Urine      Normal mg/dL    Blood, Urine      NEGATIVE     Ketones, Urine      NEGATIVE mg/dL    Bilirubin, Urine      NEGATIVE     Urobilinogen, Urine      Normal mg/dL    Nitrite, Urine      NEGATIVE     Leukocyte Esterase, Urine      NEGATIVE     LEUKOCYTES (10*3/UL) IN BLOOD BY AUTOMATED COUNT, [place]      4.4 - 11.3 x10*3/uL 16.0 (H)    nRBC      0.0 - 0.0 /100 WBCs 0.0    ERYTHROCYTES (10*6/UL) IN BLOOD BY AUTOMATED COUNT, [place]      4.00 - 5.20 x10*6/uL 2.04 (L)    HEMOGLOBIN      12.0 - 16.0 g/dL 9.0 (L)    HEMOGLOBIN       6.3 (LL)    HEMATOCRIT      36.0 - 46.0 % 18.7 (L)    MCV      80 - 100 fL 92    MCH      26.0 - 34.0 pg 30.9    MCHC      32.0 - 36.0 g/dL 33.7    RED CELL DISTRIBUTION WIDTH      11.5 - 14.5 % 14.3    PLATELETS (10*3/UL) IN BLOOD AUTOMATED COUNT, [place]      150 - 450 x10*3/uL 393    GLUCOSE      74 - 99 mg/dL    SODIUM      136 - 145 mmol/L    POTASSIUM      3.5 - 5.3 mmol/L    CHLORIDE      " 98 - 107 mmol/L    Bicarbonate      21 - 32 mmol/L    Anion Gap      10 - 20 mmol/L    Blood Urea Nitrogen      6 - 23 mg/dL    Creatinine      0.50 - 1.05 mg/dL    EGFR      >60 mL/min/1.73m*2    Calcium      8.6 - 10.3 mg/dL    ABO TYPE B    ABO TYPE B    Rh Type POS    Rh Type POS    ANTIBODY SCREEN NEG      Component      Latest Ref [facility] 8/7/2024   Color, Urine      Light-Yellow, Yellow, Dark-Yellow  Light-Yellow    Appearance, Urine      Clear  Clear    Specific Gravity, Urine      1.005 - 1.035  1.017    pH, Urine      5.0, 5.5, 6.0, 6.5, 7.0, 7.5, 8.0  7.0    Protein, Urine      NEGATIVE, 10 (TRACE), 20 (TRACE) mg/dL NEGATIVE    Glucose, Urine      Normal mg/dL Normal    Blood, Urine      NEGATIVE  NEGATIVE    Ketones, Urine      NEGATIVE mg/dL 60 (2+) !    Bilirubin, Urine      NEGATIVE  NEGATIVE    Urobilinogen, Urine      Normal mg/dL Normal    Nitrite, Urine      NEGATIVE  NEGATIVE    Leukocyte Esterase, Urine      NEGATIVE  NEGATIVE    LEUKOCYTES (10*3/UL) IN BLOOD BY AUTOMATED COUNT, [place]      4.4 - 11.3 x10*3/uL 6.9    nRBC      0.0 - 0.0 /100 WBCs 0.0    ERYTHROCYTES (10*6/UL) IN BLOOD BY AUTOMATED COUNT, [place]      4.00 - 5.20 x10*6/uL 2.21 (L)    HEMOGLOBIN      12.0 - 16.0 g/dL 6.6 (L)    HEMATOCRIT      36.0 - 46.0 % 19.1 (L)    MCV      80 - 100 fL 86    MCH      26.0 - 34.0 pg 29.9    MCHC      32.0 - 36.0 g/dL 34.6    RED CELL DISTRIBUTION WIDTH      11.5 - 14.5 % 15.6 (H)    PLATELETS (10*3/UL) IN BLOOD AUTOMATED COUNT, [place]      150 - 450 x10*3/uL 217    GLUCOSE      74 - 99 mg/dL 79    SODIUM      136 - 145 mmol/L 139    POTASSIUM      3.5 - 5.3 mmol/L 3.4 (L)    CHLORIDE      98 - 107 mmol/L 109 (H)    Bicarbonate      21 - 32 mmol/L 24    Anion Gap      10 - 20 mmol/L 9 (L)    Blood Urea Nitrogen      6 - 23 mg/dL 16    Creatinine      0.50 - 1.05 mg/dL 0.64    EGFR      >60 mL/min/1.73m*2 >90    Calcium      8.6 - 10.3 mg/dL 7.5 (L)    ABO TYPE    Rh Type    ANTIBODY SCREEN        Legend:  (H) High  (L) Low  (LL) Low Panic  ! Abnormal             Assessment/Plan   Principal Problem:    GI bleeding  Active Problems:    Duodenal ulcer    Perforated ulcer of intestine (Multi)    Acute blood loss anemia    Acute gastritis with hemorrhage    29 y/o female who is admitted with UGI bleed and concern for contained perforation.  From an infectious point of view, appears to be improving with improving pain and WBC normalized, however she is continuing to bleed despite being on PPI BID.  -Strict NPO, IVF  -BID PPI  -Agree with 2 units PRBC  -Per GI, due to concern for microperforation, will not perform EGD due to risk to reopen perforation.  As a result, will either need angioembolization, which is not done at this facility, or surgery.  Will discuss with IR about the feasibility of this.  If continues to bleed, may need to go to [facility] for further care.    Goran Escobar MD  08/07/24  12:39 PM         I spent 49 minutes in the professional and overall care of this patient.      Goran Escobar MD

## 2024-08-07 NOTE — CONSULTS
"Nutrition Initial Assessment:   Nutrition Assessment         Patient is a 28 y.o. female presenting with GI bleeding      Nutrition History:  Food and Nutrient History: RD consulted for MST = 3. From [facility]. Currently NPO. Per provider note, CT shows likely microperforated duodenal ulcer as well as large clot in gastric body. Tearful during visit, though okay with completing nutrition assessment. Reports good intakes PTA, with last meal being day of admission. No recent wt records in EMR, though pt denies recent wt changes. Denies nutritional concerns PTA.  Vitamin/Herbal Supplement Use: none  Food Allergies/Intolerances:  None  GI Symptoms: Nausea and Abdominal pain  Oral Problems: None       Anthropometrics:  Height: 160 cm (5' 3\")   Weight: 57.7 kg (127 lb 3.3 oz)   BMI (Calculated): 22.54             Weight History:     Wt Readings from Last 10 Encounters:   08/06/24 57.7 kg (127 lb 3.3 oz)         Weight Change %:  Weight History / % Weight Change: No recent wt records. Pt denies unintentional wt changes    Nutrition Focused Physical Exam Findings:    Subcutaneous Fat Loss:   Orbital Fat Pads: Well nourished (slightly bulging fat pads)  Buccal Fat Pads: Well nourished (full, rounded cheeks)  Triceps: Well nourished (ample fat tissue)  Ribs: Defer  Muscle Wasting:  Temporalis: Well nourished (well-defined muscle)  Pectoralis (Clavicular Region): Well nourished (clavicle not visible)  Deltoid/Trapezius: Well nourished (rounded appearance at arm, shoulder, neck)  Interosseous: Well nourished (muscle bulges)  Trapezius/Infraspinatus/Supraspinatus (Scapular Region): Defer  Quadriceps: Defer  Gastrocnemius: Defer  Edema:  Edema: none  Physical Findings:  Skin: Negative (for pressure injuries)    Nutrition Significant Labs:  BMP Trend:   Results from last 7 days   Lab Units 08/07/24  0711 08/05/24  2238   GLUCOSE mg/dL 79 93   CALCIUM mg/dL 7.5* 7.9*   SODIUM mmol/L 139 133*   POTASSIUM mmol/L 3.4* 3.5   CO2 mmol/L 24 " 24   CHLORIDE mmol/L 109* 100   BUN mg/dL 16 30*   CREATININE mg/dL 0.64 0.64        Nutrition Specific Medications:  Reviewed    I/O:   Last BM Date: 08/07/24;      Dietary Orders (From admission, onward)       Start     Ordered    08/06/24 0130  NPO Diet; Effective now  Diet effective now         08/06/24 0130                     Estimated Needs:   Total Energy Estimated Needs (kCal): 1585 kCal  Method for Estimating Needs: 0201-8777 kcal (25-30 kcal/kg)  Total Protein Estimated Needs (g): 58 g  Method for Estimating Needs: 1 g/kg  Total Fluid Estimated Needs (mL): 1585 mL  Method for Estimating Needs: 1 ml/kcal or per MD        Nutrition Diagnosis   Malnutrition Diagnosis  Patient has Malnutrition Diagnosis: No    Nutrition Diagnosis  Patient has Nutrition Diagnosis: Yes  Diagnosis Status (1): New  Nutrition Diagnosis 1: Inadequate oral intake  Related to (1): altered GI function  As Evidenced by (1): surgery and GI recommending NPO       Nutrition Interventions/Recommendations         Nutrition Prescription:  Individualized Nutrition Prescription Provided for : NPO. Diet advancement per MD when medically indicated. If unable to advance diet past NPO/clear liquids by day 7-10, may need to consider PN.        Nutrition Interventions:   Additional Interventions: NPO at this time. Diet advancement per MD when medically indicated      Nutrition Education:      Discussed will remain NPO until diet able to be advanced by MD when medically indicated. Pt expressed understanding.    Nutrition Monitoring and Evaluation   Food/Nutrient Related History Monitoring  Additional Plans: To monitor for nutrition plan of care    Body Composition/Growth/Weight History  Monitoring and Evaluation Plan: Weight  Weight: Measured weight  Criteria: Maintain stable wt    Biochemical Data, Medical Tests and Procedures  Monitoring and Evaluation Plan: Electrolyte/renal panel, Glucose/endocrine profile  Electrolyte and Renal Panel:  Phosphorus, Potassium, Sodium  Criteria: Electrolytes WNL  Glucose/Endocrine Profile: Glucose, casual  Criteria: BG within desired limits    Nutrition Focused Physical Findings  Monitoring and Evaluation Plan: Digestive System  Digestive System: Other (Comment), Nausea (abdominal pain)  Criteria: Improvement in GI symptoms         Time Spent (min): 45 minutes

## 2024-08-08 LAB
ANION GAP SERPL CALC-SCNC: 11 MMOL/L (ref 10–20)
BLOOD EXPIRATION DATE: NORMAL
BLOOD EXPIRATION DATE: NORMAL
BUN SERPL-MCNC: 9 MG/DL (ref 6–23)
CALCIUM SERPL-MCNC: 7.8 MG/DL (ref 8.6–10.3)
CHLORIDE SERPL-SCNC: 107 MMOL/L (ref 98–107)
CO2 SERPL-SCNC: 24 MMOL/L (ref 21–32)
CREAT SERPL-MCNC: 0.7 MG/DL (ref 0.5–1.05)
DISPENSE STATUS: NORMAL
DISPENSE STATUS: NORMAL
EGFRCR SERPLBLD CKD-EPI 2021: >90 ML/MIN/1.73M*2
ERYTHROCYTE [DISTWIDTH] IN BLOOD BY AUTOMATED COUNT: 17.6 % (ref 11.5–14.5)
GLUCOSE SERPL-MCNC: 95 MG/DL (ref 74–99)
HCT VFR BLD AUTO: 29.2 % (ref 36–46)
HGB BLD-MCNC: 10 G/DL (ref 12–16)
HOLD SPECIMEN: NORMAL
MCH RBC QN AUTO: 27.9 PG (ref 26–34)
MCHC RBC AUTO-ENTMCNC: 34.2 G/DL (ref 32–36)
MCV RBC AUTO: 81 FL (ref 80–100)
NRBC BLD-RTO: 0 /100 WBCS (ref 0–0)
PLATELET # BLD AUTO: 254 X10*3/UL (ref 150–450)
POTASSIUM SERPL-SCNC: 3.7 MMOL/L (ref 3.5–5.3)
PRODUCT BLOOD TYPE: 5100
PRODUCT BLOOD TYPE: 5100
PRODUCT CODE: NORMAL
PRODUCT CODE: NORMAL
RBC # BLD AUTO: 3.59 X10*6/UL (ref 4–5.2)
SODIUM SERPL-SCNC: 138 MMOL/L (ref 136–145)
UNIT ABO: NORMAL
UNIT ABO: NORMAL
UNIT NUMBER: NORMAL
UNIT NUMBER: NORMAL
UNIT RH: NORMAL
UNIT RH: NORMAL
UNIT VOLUME: 279
UNIT VOLUME: 350
WBC # BLD AUTO: 8.8 X10*3/UL (ref 4.4–11.3)
XM INTEP: NORMAL
XM INTEP: NORMAL

## 2024-08-08 PROCEDURE — 99232 SBSQ HOSP IP/OBS MODERATE 35: CPT | Performed by: SURGERY

## 2024-08-08 PROCEDURE — 80048 BASIC METABOLIC PNL TOTAL CA: CPT | Performed by: STUDENT IN AN ORGANIZED HEALTH CARE EDUCATION/TRAINING PROGRAM

## 2024-08-08 PROCEDURE — 82374 ASSAY BLOOD CARBON DIOXIDE: CPT | Performed by: STUDENT IN AN ORGANIZED HEALTH CARE EDUCATION/TRAINING PROGRAM

## 2024-08-08 PROCEDURE — 2500000004 HC RX 250 GENERAL PHARMACY W/ HCPCS (ALT 636 FOR OP/ED): Performed by: STUDENT IN AN ORGANIZED HEALTH CARE EDUCATION/TRAINING PROGRAM

## 2024-08-08 PROCEDURE — 85027 COMPLETE CBC AUTOMATED: CPT | Performed by: STUDENT IN AN ORGANIZED HEALTH CARE EDUCATION/TRAINING PROGRAM

## 2024-08-08 PROCEDURE — 2500000004 HC RX 250 GENERAL PHARMACY W/ HCPCS (ALT 636 FOR OP/ED): Performed by: INTERNAL MEDICINE

## 2024-08-08 PROCEDURE — 36415 COLL VENOUS BLD VENIPUNCTURE: CPT | Performed by: STUDENT IN AN ORGANIZED HEALTH CARE EDUCATION/TRAINING PROGRAM

## 2024-08-08 PROCEDURE — 99232 SBSQ HOSP IP/OBS MODERATE 35: CPT | Performed by: STUDENT IN AN ORGANIZED HEALTH CARE EDUCATION/TRAINING PROGRAM

## 2024-08-08 PROCEDURE — 1200000002 HC GENERAL ROOM WITH TELEMETRY DAILY

## 2024-08-08 PROCEDURE — C9113 INJ PANTOPRAZOLE SODIUM, VIA: HCPCS | Performed by: INTERNAL MEDICINE

## 2024-08-08 ASSESSMENT — COGNITIVE AND FUNCTIONAL STATUS - GENERAL
MOBILITY SCORE: 24
DAILY ACTIVITIY SCORE: 24

## 2024-08-08 ASSESSMENT — PAIN SCALES - GENERAL
PAINLEVEL_OUTOF10: 8
PAINLEVEL_OUTOF10: 8
PAINLEVEL_OUTOF10: 10 - WORST POSSIBLE PAIN
PAINLEVEL_OUTOF10: 8
PAINLEVEL_OUTOF10: 9
PAINLEVEL_OUTOF10: 9

## 2024-08-08 ASSESSMENT — PAIN - FUNCTIONAL ASSESSMENT
PAIN_FUNCTIONAL_ASSESSMENT: 0-10

## 2024-08-08 ASSESSMENT — PAIN DESCRIPTION - LOCATION: LOCATION: ABDOMEN

## 2024-08-08 ASSESSMENT — PAIN SCALES - PAIN ASSESSMENT IN ADVANCED DEMENTIA (PAINAD): TOTALSCORE: MEDICATION (SEE MAR)

## 2024-08-08 NOTE — NURSING NOTE
Asked Dr. Mtz to come and speak with patient. Patient was asking for food and wanting to leave AMA. Told patient that was not possible due to her condition. Bibi came to bedside and spoke with patient who was tearful and anxious. Bibi allowed patient to have a few saltine crackers and an ice cream. Patient understood that it might make her condition worse. Patient was willing to stay after the talk and getting the snacks. Patient also refused CBC blood draw post transfusion. Keya HORNER was made aware.

## 2024-08-08 NOTE — PROGRESS NOTES
"Lisa Montemayor is a 28 y.o. female on day 2 of admission presenting with GI bleeding.    Subjective   No acute events overnight, responded to 2 units PRBC 6.6->10, WBC normalized, no more hematachezia, pain improved       Objective     Physical Exam  Gen: NAD, Aax3  Abd; Soft, ND, less TTP in RUQ  Last Recorded Vitals  Blood pressure 110/61, pulse 104, temperature 36.4 °C (97.5 °F), temperature source Temporal, resp. rate 18, height 1.6 m (5' 3\"), weight 57.7 kg (127 lb 3.3 oz), SpO2 97%.  Intake/Output last 3 Shifts:  I/O last 3 completed shifts:  In: 4805.8 (83.3 mL/kg) [I.V.:1938.3 (33.6 mL/kg); Blood:2467.5; IV Piggyback:400]  Out: - (0 mL/kg)   Weight: 57.7 kg     Relevant Results              Component      Latest Ref Rng 8/5/2024   LEUKOCYTES (10*3/UL) IN BLOOD BY AUTOMATED COUNT, Filipino      4.4 - 11.3 x10*3/uL 14.5 (H)    nRBC      0.0 - 0.0 /100 WBCs 0.0    ERYTHROCYTES (10*6/UL) IN BLOOD BY AUTOMATED COUNT, Filipino      4.00 - 5.20 x10*6/uL 2.67 (L)    HEMOGLOBIN      12.0 - 16.0 g/dL 8.1 (L)    HEMATOCRIT      36.0 - 46.0 % 24.2 (L)    MCV      80 - 100 fL 91    MCH      26.0 - 34.0 pg 30.3    MCHC      32.0 - 36.0 g/dL 33.5    RED CELL DISTRIBUTION WIDTH      11.5 - 14.5 % 14.0    PLATELETS (10*3/UL) IN BLOOD AUTOMATED COUNT, Filipino      150 - 450 x10*3/uL 387    NEUTROPHILS/100 LEUKOCYTES IN BLOOD BY AUTOMATED COUNT, Filip[place]      40.0 - 80.0 % 71.9    Immature Granulocytes %, Automated      0.0 - 0.9 % 0.4    Lymphocytes %      13.0 - 44.0 % 21.5    Monocytes %      2.0 - 10.0 % 5.9    Eosinophils %      0.0 - 6.0 % 0.1    Basophils %      0.0 - 2.0 % 0.2    NEUTROPHILS (10*3/UL) IN BLOOD BY AUTOMATED COUNT, Filip[place]      1.20 - 7.70 x10*3/uL 10.41 (H)    Immature Granulocytes Absolute, Automated      0.00 - 0.70 x10*3/uL 0.06    Lymphocytes Absolute      1.20 - 4.80 x10*3/uL 3.12    Monocytes Absolute      0.10 - 1.00 x10*3/uL 0.85    Eosinophils Absolute      0.00 - 0.70 x10*3/uL 0.01  "   Basophils Absolute      0.00 - 0.10 x10*3/uL 0.03    GLUCOSE      74 - 99 mg/dL 93    SODIUM      136 - 145 mmol/L 133 (L)    POTASSIUM      3.5 - 5.3 mmol/L 3.5    CHLORIDE      98 - 107 mmol/L 100    Bicarbonate      21 - 32 mmol/L 24    Anion Gap      10 - 20 mmol/L 13    Blood Urea Nitrogen      6 - 23 mg/dL 30 (H)    Creatinine      0.50 - 1.05 mg/dL 0.64    EGFR      >60 mL/min/1.73m*2 >90    Calcium      8.6 - 10.3 mg/dL 7.9 (L)    Albumin      3.4 - 5.0 g/dL 3.4    Alkaline Phosphatase      33 - 110 U/L 45    Total Protein      6.4 - 8.2 g/dL 5.6 (L)    AST      9 - 39 U/L 29    Bilirubin Total      0.0 - 1.2 mg/dL 0.2    ALT      7 - 45 U/L 15    Color, Urine      Light-Yellow, Yellow, Dark-Yellow  Colorless ! (N)    Appearance, Urine      Clear  Clear    Specific Gravity, Urine      1.005 - 1.035  1.009    pH, Urine      5.0, 5.5, 6.0, 6.5, 7.0, 7.5, 8.0  5.5    Protein, Urine      NEGATIVE, 10 (TRACE), 20 (TRACE) mg/dL NEGATIVE    Glucose, Urine      Normal mg/dL Normal    Blood, Urine      NEGATIVE  NEGATIVE    Ketones, Urine      NEGATIVE mg/dL NEGATIVE    Bilirubin, Urine      NEGATIVE  NEGATIVE    Urobilinogen, Urine      Normal mg/dL Normal    Nitrite, Urine      NEGATIVE  NEGATIVE    Leukocyte Esterase, Urine      NEGATIVE  NEGATIVE    Amphetamine Screen, Urine      Presumptive Negative  Presumptive Negative    Barbiturate Screen, Urine      Presumptive Negative  Presumptive Negative    Benzodiazepines Screen, Urine      Presumptive Negative  Presumptive Negative    Cannabinoid Screen, Urine      Presumptive Negative  Presumptive Negative    Cocaine Metabolite Screen, Urine      Presumptive Negative  Presumptive Negative    Fentanyl Screen, Urine      Presumptive Negative  Presumptive Positive !    Opiate Screen, Urine      Presumptive Negative  Presumptive Negative    Oxycodone Screen, Urine      Presumptive Negative  Presumptive Negative    PCP Screen, Urine      Presumptive Negative   Presumptive Negative    Methadone Screen, Urine      Presumptive Negative  Presumptive Negative    Acetaminophen      10.0 - 30.0 ug/mL 10.9    Salicylate       4 - 20 mg/dL <3    Alcohol      <=10 mg/dL <10    ABO TYPE    Rh Type    ANTIBODY SCREEN    HCG, Urine      NEGATIVE  NEGATIVE    LIPASE      9 - 82 U/L 49    Lactate      0.4 - 2.0 mmol/L 1.3    AMYLASE      29 - 103 U/L 25 (L)    Extra Tube Hold for add-ons.    Gastrin      0 - 100 pg/mL      Component      Latest Ref Rng 8/6/2024   LEUKOCYTES (10*3/UL) IN BLOOD BY AUTOMATED COUNT, [place]      4.4 - 11.3 x10*3/uL 16.0 (H)    nRBC      0.0 - 0.0 /100 WBCs 0.0    ERYTHROCYTES (10*6/UL) IN BLOOD BY AUTOMATED COUNT, [place]      4.00 - 5.20 x10*6/uL 2.04 (L)    HEMOGLOBIN      12.0 - 16.0 g/dL 9.0 (L)    HEMOGLOBIN       6.3 (LL)    HEMATOCRIT      36.0 - 46.0 % 18.7 (L)    MCV      80 - 100 fL 92    MCH      26.0 - 34.0 pg 30.9    MCHC      32.0 - 36.0 g/dL 33.7    RED CELL DISTRIBUTION WIDTH      11.5 - 14.5 % 14.3    PLATELETS (10*3/UL) IN BLOOD AUTOMATED COUNT, [place]      150 - 450 x10*3/uL 393    NEUTROPHILS/100 LEUKOCYTES IN BLOOD BY AUTOMATED COUNT, [place]      40.0 - 80.0 %    Immature Granulocytes %, Automated      0.0 - 0.9 %    Lymphocytes %      13.0 - 44.0 %    Monocytes %      2.0 - 10.0 %    Eosinophils %      0.0 - 6.0 %    Basophils %      0.0 - 2.0 %    NEUTROPHILS (10*3/UL) IN BLOOD BY AUTOMATED COUNT, [place]      1.20 - 7.70 x10*3/uL    Immature Granulocytes Absolute, Automated      0.00 - 0.70 x10*3/uL    Lymphocytes Absolute      1.20 - 4.80 x10*3/uL    Monocytes Absolute      0.10 - 1.00 x10*3/uL    Eosinophils Absolute      0.00 - 0.70 x10*3/uL    Basophils Absolute      0.00 - 0.10 x10*3/uL    GLUCOSE      74 - 99 mg/dL    SODIUM      136 - 145 mmol/L    POTASSIUM      3.5 - 5.3 mmol/L    CHLORIDE      98 - 107 mmol/L    Bicarbonate      21 - 32 mmol/L    Anion Gap      10 - 20 mmol/L    Blood Urea Nitrogen      6 - 23 mg/dL     Creatinine      0.50 - 1.05 mg/dL    EGFR      >60 mL/min/1.73m*2    Calcium      8.6 - 10.3 mg/dL    Albumin      3.4 - 5.0 g/dL    Alkaline Phosphatase      33 - 110 U/L    Total Protein      6.4 - 8.2 g/dL    AST      9 - 39 U/L    Bilirubin Total      0.0 - 1.2 mg/dL    ALT      7 - 45 U/L    Color, Urine      Light-Yellow, Yellow, Dark-Yellow     Appearance, Urine      Clear     Specific Gravity, Urine      1.005 - 1.035     pH, Urine      5.0, 5.5, 6.0, 6.5, 7.0, 7.5, 8.0     Protein, Urine      NEGATIVE, 10 (TRACE), 20 (TRACE) mg/dL    Glucose, Urine      Normal mg/dL    Blood, Urine      NEGATIVE     Ketones, Urine      NEGATIVE mg/dL    Bilirubin, Urine      NEGATIVE     Urobilinogen, Urine      Normal mg/dL    Nitrite, Urine      NEGATIVE     Leukocyte Esterase, Urine      NEGATIVE     Amphetamine Screen, Urine      Presumptive Negative     Barbiturate Screen, Urine      Presumptive Negative     Benzodiazepines Screen, Urine      Presumptive Negative     Cannabinoid Screen, Urine      Presumptive Negative     Cocaine Metabolite Screen, Urine      Presumptive Negative     Fentanyl Screen, Urine      Presumptive Negative     Opiate Screen, Urine      Presumptive Negative     Oxycodone Screen, Urine      Presumptive Negative     PCP Screen, Urine      Presumptive Negative     Methadone Screen, Urine      Presumptive Negative     Acetaminophen      10.0 - 30.0 ug/mL    Salicylate       4 - 20 mg/dL    Alcohol      <=10 mg/dL    ABO TYPE B    ABO TYPE B    Rh Type POS    Rh Type POS    ANTIBODY SCREEN NEG    HCG, Urine      NEGATIVE     LIPASE      9 - 82 U/L    Lactate      0.4 - 2.0 mmol/L    AMYLASE      29 - 103 U/L    Extra Tube    Gastrin      0 - 100 pg/mL 13      Component      Latest Ref Rn 8/7/2024   LEUKOCYTES (10*3/UL) IN BLOOD BY AUTOMATED COUNT, Hungarian      4.4 - 11.3 x10*3/uL 6.9    nRBC      0.0 - 0.0 /100 WBCs 0.0    ERYTHROCYTES (10*6/UL) IN BLOOD BY AUTOMATED COUNT, Hungarian      4.00 -  5.20 x10*6/uL 2.21 (L)    HEMOGLOBIN      12.0 - 16.0 g/dL 6.6 (L)    HEMATOCRIT      36.0 - 46.0 % 19.1 (L)    MCV      80 - 100 fL 86    MCH      26.0 - 34.0 pg 29.9    MCHC      32.0 - 36.0 g/dL 34.6    RED CELL DISTRIBUTION WIDTH      11.5 - 14.5 % 15.6 (H)    PLATELETS (10*3/UL) IN BLOOD AUTOMATED COUNT, [place]      150 - 450 x10*3/uL 217    NEUTROPHILS/100 LEUKOCYTES IN BLOOD BY AUTOMATED COUNT, [place]      40.0 - 80.0 %    Immature Granulocytes %, Automated      0.0 - 0.9 %    Lymphocytes %      13.0 - 44.0 %    Monocytes %      2.0 - 10.0 %    Eosinophils %      0.0 - 6.0 %    Basophils %      0.0 - 2.0 %    NEUTROPHILS (10*3/UL) IN BLOOD BY AUTOMATED COUNT, [place]      1.20 - 7.70 x10*3/uL    Immature Granulocytes Absolute, Automated      0.00 - 0.70 x10*3/uL    Lymphocytes Absolute      1.20 - 4.80 x10*3/uL    Monocytes Absolute      0.10 - 1.00 x10*3/uL    Eosinophils Absolute      0.00 - 0.70 x10*3/uL    Basophils Absolute      0.00 - 0.10 x10*3/uL    GLUCOSE      74 - 99 mg/dL 79    SODIUM      136 - 145 mmol/L 139    POTASSIUM      3.5 - 5.3 mmol/L 3.4 (L)    CHLORIDE      98 - 107 mmol/L 109 (H)    Bicarbonate      21 - 32 mmol/L 24    Anion Gap      10 - 20 mmol/L 9 (L)    Blood Urea Nitrogen      6 - 23 mg/dL 16    Creatinine      0.50 - 1.05 mg/dL 0.64    EGFR      >60 mL/min/1.73m*2 >90    Calcium      8.6 - 10.3 mg/dL 7.5 (L)    Albumin      3.4 - 5.0 g/dL    Alkaline Phosphatase      33 - 110 U/L    Total Protein      6.4 - 8.2 g/dL    AST      9 - 39 U/L    Bilirubin Total      0.0 - 1.2 mg/dL    ALT      7 - 45 U/L    Color, Urine      Light-Yellow, Yellow, Dark-Yellow  Light-Yellow    Appearance, Urine      Clear  Clear    Specific Gravity, Urine      1.005 - 1.035  1.017    pH, Urine      5.0, 5.5, 6.0, 6.5, 7.0, 7.5, 8.0  7.0    Protein, Urine      NEGATIVE, 10 (TRACE), 20 (TRACE) mg/dL NEGATIVE    Glucose, Urine      Normal mg/dL Normal    Blood, Urine      NEGATIVE  NEGATIVE     Ketones, Urine      NEGATIVE mg/dL 60 (2+) !    Bilirubin, Urine      NEGATIVE  NEGATIVE    Urobilinogen, Urine      Normal mg/dL Normal    Nitrite, Urine      NEGATIVE  NEGATIVE    Leukocyte Esterase, Urine      NEGATIVE  NEGATIVE    Amphetamine Screen, Urine      Presumptive Negative     Barbiturate Screen, Urine      Presumptive Negative     Benzodiazepines Screen, Urine      Presumptive Negative     Cannabinoid Screen, Urine      Presumptive Negative     Cocaine Metabolite Screen, Urine      Presumptive Negative     Fentanyl Screen, Urine      Presumptive Negative     Opiate Screen, Urine      Presumptive Negative     Oxycodone Screen, Urine      Presumptive Negative     PCP Screen, Urine      Presumptive Negative     Methadone Screen, Urine      Presumptive Negative     Acetaminophen      10.0 - 30.0 ug/mL    Salicylate       4 - 20 mg/dL    Alcohol      <=10 mg/dL    ABO TYPE    Rh Type    ANTIBODY SCREEN    HCG, Urine      NEGATIVE     LIPASE      9 - 82 U/L    Lactate      0.4 - 2.0 mmol/L    AMYLASE      29 - 103 U/L    Extra Tube Hold for add-ons.    Gastrin      0 - 100 pg/mL      Component      Latest Ref Rng 8/8/2024   LEUKOCYTES (10*3/UL) IN BLOOD BY AUTOMATED COUNT, [place]ese      4.4 - 11.3 x10*3/uL 8.8    nRBC      0.0 - 0.0 /100 WBCs 0.0    ERYTHROCYTES (10*6/UL) IN BLOOD BY AUTOMATED COUNT, [place]      4.00 - 5.20 x10*6/uL 3.59 (L)    HEMOGLOBIN      12.0 - 16.0 g/dL 10.0 (L)    HEMATOCRIT      36.0 - 46.0 % 29.2 (L)    MCV      80 - 100 fL 81    MCH      26.0 - 34.0 pg 27.9    MCHC      32.0 - 36.0 g/dL 34.2    RED CELL DISTRIBUTION WIDTH      11.5 - 14.5 % 17.6 (H)    PLATELETS (10*3/UL) IN BLOOD AUTOMATED COUNT, [place]      150 - 450 x10*3/uL 254    NEUTROPHILS/100 LEUKOCYTES IN BLOOD BY AUTOMATED COUNT, [place]      40.0 - 80.0 %    Immature Granulocytes %, Automated      0.0 - 0.9 %    Lymphocytes %      13.0 - 44.0 %    Monocytes %      2.0 - 10.0 %    Eosinophils %      0.0 - 6.0 %     Basophils %      0.0 - 2.0 %    NEUTROPHILS (10*3/UL) IN BLOOD BY AUTOMATED COUNT, Thai      1.20 - 7.70 x10*3/uL    Immature Granulocytes Absolute, Automated      0.00 - 0.70 x10*3/uL    Lymphocytes Absolute      1.20 - 4.80 x10*3/uL    Monocytes Absolute      0.10 - 1.00 x10*3/uL    Eosinophils Absolute      0.00 - 0.70 x10*3/uL    Basophils Absolute      0.00 - 0.10 x10*3/uL    GLUCOSE      74 - 99 mg/dL 95    SODIUM      136 - 145 mmol/L 138    POTASSIUM      3.5 - 5.3 mmol/L 3.7    CHLORIDE      98 - 107 mmol/L 107    Bicarbonate      21 - 32 mmol/L 24    Anion Gap      10 - 20 mmol/L 11    Blood Urea Nitrogen      6 - 23 mg/dL 9    Creatinine      0.50 - 1.05 mg/dL 0.70    EGFR      >60 mL/min/1.73m*2 >90    Calcium      8.6 - 10.3 mg/dL 7.8 (L)    Albumin      3.4 - 5.0 g/dL    Alkaline Phosphatase      33 - 110 U/L    Total Protein      6.4 - 8.2 g/dL    AST      9 - 39 U/L    Bilirubin Total      0.0 - 1.2 mg/dL    ALT      7 - 45 U/L    Color, Urine      Light-Yellow, Yellow, Dark-Yellow     Appearance, Urine      Clear     Specific Gravity, Urine      1.005 - 1.035     pH, Urine      5.0, 5.5, 6.0, 6.5, 7.0, 7.5, 8.0     Protein, Urine      NEGATIVE, 10 (TRACE), 20 (TRACE) mg/dL    Glucose, Urine      Normal mg/dL    Blood, Urine      NEGATIVE     Ketones, Urine      NEGATIVE mg/dL    Bilirubin, Urine      NEGATIVE     Urobilinogen, Urine      Normal mg/dL    Nitrite, Urine      NEGATIVE     Leukocyte Esterase, Urine      NEGATIVE     Amphetamine Screen, Urine      Presumptive Negative     Barbiturate Screen, Urine      Presumptive Negative     Benzodiazepines Screen, Urine      Presumptive Negative     Cannabinoid Screen, Urine      Presumptive Negative     Cocaine Metabolite Screen, Urine      Presumptive Negative     Fentanyl Screen, Urine      Presumptive Negative     Opiate Screen, Urine      Presumptive Negative     Oxycodone Screen, Urine      Presumptive Negative     PCP Screen, Urine       Presumptive Negative     Methadone Screen, Urine      Presumptive Negative     Acetaminophen      10.0 - 30.0 ug/mL    Salicylate       4 - 20 mg/dL    Alcohol      <=10 mg/dL    ABO TYPE    Rh Type    ANTIBODY SCREEN    HCG, Urine      NEGATIVE     LIPASE      9 - 82 U/L    Lactate      0.4 - 2.0 mmol/L    AMYLASE      29 - 103 U/L    Extra Tube    Gastrin      0 - 100 pg/mL       Legend:  (H) High  (L) Low  ! (N) Normal  ! Abnormal  (LL) Low Panic               Assessment/Plan   Principal Problem:    GI bleeding  Active Problems:    Duodenal ulcer    Perforated ulcer of intestine (Multi)    Acute blood loss anemia    Acute gastritis with hemorrhage    27 y/o female, presenting with UGI bleed and possible contained perforation, no more evidence of active bleeding and infection appears to be imrpoving  -Strict NPO, IVF  -BID PPI  -Zosyn  -Plan for Ugi tomorrow am, if negative, ok to ADAT  -Cont. Care per primary team, will cont. To follow       I spent 46 minutes in the professional and overall care of this patient.      Goran Escobar MD  08/08/24  10:17 AM

## 2024-08-08 NOTE — CARE PLAN
"The patient's goals for the shift include \"to be able to eat or drink something\"    The clinical goals for the shift include pain control      Problem: Skin  Goal: Prevent/manage excess moisture  Outcome: Progressing  Goal: Prevent/minimize sheer/friction injuries  Outcome: Progressing     Problem: Pain - Adult  Goal: Verbalizes/displays adequate comfort level or baseline comfort level  Outcome: Progressing     Problem: Safety - Adult  Goal: Free from fall injury  Outcome: Progressing     Problem: Fall/Injury  Goal: Not fall by end of shift  Outcome: Progressing  Goal: Be free from injury by end of the shift  Outcome: Progressing  Goal: Verbalize understanding of personal risk factors for fall in the hospital  Outcome: Progressing  Goal: Verbalize understanding of risk factor reduction measures to prevent injury from fall in the home  Outcome: Progressing  Goal: Use assistive devices by end of the shift  Outcome: Progressing  Goal: Pace activities to prevent fatigue by end of the shift  Outcome: Progressing       "

## 2024-08-08 NOTE — PROGRESS NOTES
Lisa Montemayor is a 28 y.o. female on day 2 of admission presenting with GI bleeding.      Subjective   Patient is mildly tachycardic, pain is better controlled  Patient is strict n.p.o.  Plan for upper GI in am by surgery     Objective     Last Recorded Vitals  /61 (BP Location: Right arm, Patient Position: Lying)   Pulse 104   Temp 36.4 °C (97.5 °F) (Temporal)   Resp 18   Wt 57.7 kg (127 lb 3.3 oz)   SpO2 97%   Intake/Output last 3 Shifts:    Intake/Output Summary (Last 24 hours) at 8/8/2024 1229  Last data filed at 8/7/2024 2208  Gross per 24 hour   Intake 2952.5 ml   Output --   Net 2952.5 ml       Admission Weight  Weight: 61.2 kg (135 lb) (08/05/24 2233)    Daily Weight  08/06/24 : 57.7 kg (127 lb 3.3 oz)    Image Results  ECG 12 lead  Normal sinus rhythm  Prolonged QT  Abnormal ECG  When compared with ECG of 06-AUG-2024 03:40,  Vent. rate has decreased BY  84 BPM  ST no longer depressed in Inferior leads  Confirmed by Luc Limon ([id number]) on 8/7/2024 7:57:54 PM      Physical Exam    General: Well-developed ill female, in no acute distress  HEENT: AT, NC, no JVD, no lymphadenopathy, neck supple  Lungs: Clear, no wheezing, no crackles  Cardiac: Tachycardic, no murmur, no gallop  Abdomen: epigastric tenderness noted, no distention, diminished bowel sounds  Extremities: No deformity, no edema, pulses intact, ROM intact  Neurological: Alert awake oriented x3, sensation intact, clear speech    Assessment/Plan      Principal Problem:    GI bleeding  Active Problems:    Duodenal ulcer    Perforated ulcer of intestine (Multi)    Acute blood loss anemia    Acute gastritis with hemorrhage      Lisa Montemayor is a 28 y.o. female with history of drug abuse, who was admitted for the mgmt of upper GI bleed, severe abdominal pain, duodenal microperforation, gastric clots, acute anemia     Telemetry monitor  Strict n.p.o., IV fluid hydration, antiemetic, IV Protonix  Avoid NSAID  Monitor CBC, transfuse to  keep hemoglobin >7  General Surgery and GI both following  No plan for EGD due to risk of worsening of microperforation  Plan for UGI by surgery in am   Will continue with IV Rocephin and Flagyl for now  Patient had an episode of fever, fever workup was ordered, UA negative for infection, pending blood cultures  S/p 2 units of PRBC transfusion, Hgb 10.1 today, still has bloody stool   Monitor CBC, transfuse to keep hemoglobin>7  VTE prophylaxis: None, high risk of bleeding  Disposition: TBD  I tried to call the father however he was not available, will try later      Lito Shah MD

## 2024-08-08 NOTE — CARE PLAN
"The patient's goals for the shift include \"to be able to eat or drink something\"    The clinical goals for the shift include safety    Over the shift, the patient did  make progress toward the following goals.   Problem: Pain - Adult  Goal: Verbalizes/displays adequate comfort level or baseline comfort level  Outcome: Progressing  Flowsheets (Taken 8/7/2024 2209)  Verbalizes/displays adequate comfort level or baseline comfort level:   Encourage patient to monitor pain and request assistance   Assess pain using appropriate pain scale     Problem: Safety - Adult  Goal: Free from fall injury  Outcome: Progressing  Flowsheets (Taken 8/7/2024 2209)  Free from fall injury:   Instruct family/caregiver on patient safety   Based on caregiver fall risk screen, instruct family/caregiver to ask for assistance with transferring infant if caregiver noted to have fall risk factors     "

## 2024-08-09 ENCOUNTER — APPOINTMENT (OUTPATIENT)
Dept: RADIOLOGY | Facility: HOSPITAL | Age: 29
End: 2024-08-09
Payer: MEDICAID

## 2024-08-09 ENCOUNTER — APPOINTMENT (OUTPATIENT)
Dept: CARDIOLOGY | Facility: HOSPITAL | Age: 29
End: 2024-08-09
Payer: MEDICAID

## 2024-08-09 LAB
ERYTHROCYTE [DISTWIDTH] IN BLOOD BY AUTOMATED COUNT: 18.3 % (ref 11.5–14.5)
HCT VFR BLD AUTO: 29 % (ref 36–46)
HGB BLD-MCNC: 9.7 G/DL (ref 12–16)
HOLD SPECIMEN: NORMAL
HOLD SPECIMEN: NORMAL
MCH RBC QN AUTO: 28.2 PG (ref 26–34)
MCHC RBC AUTO-ENTMCNC: 33.4 G/DL (ref 32–36)
MCV RBC AUTO: 84 FL (ref 80–100)
NRBC BLD-RTO: 0 /100 WBCS (ref 0–0)
PLATELET # BLD AUTO: 268 X10*3/UL (ref 150–450)
RBC # BLD AUTO: 3.44 X10*6/UL (ref 4–5.2)
WBC # BLD AUTO: 7.3 X10*3/UL (ref 4.4–11.3)

## 2024-08-09 PROCEDURE — C9113 INJ PANTOPRAZOLE SODIUM, VIA: HCPCS | Performed by: INTERNAL MEDICINE

## 2024-08-09 PROCEDURE — 2500000004 HC RX 250 GENERAL PHARMACY W/ HCPCS (ALT 636 FOR OP/ED): Performed by: INTERNAL MEDICINE

## 2024-08-09 PROCEDURE — 36569 INSJ PICC 5 YR+ W/O IMAGING: CPT

## 2024-08-09 PROCEDURE — 99233 SBSQ HOSP IP/OBS HIGH 50: CPT | Performed by: INTERNAL MEDICINE

## 2024-08-09 PROCEDURE — 99232 SBSQ HOSP IP/OBS MODERATE 35: CPT | Performed by: SURGERY

## 2024-08-09 PROCEDURE — 2550000001 HC RX 255 CONTRASTS: Performed by: SURGERY

## 2024-08-09 PROCEDURE — 74248 X-RAY SM INT F-THRU STD: CPT | Performed by: RADIOLOGY

## 2024-08-09 PROCEDURE — 82941 ASSAY OF GASTRIN: CPT | Performed by: SURGERY

## 2024-08-09 PROCEDURE — 74248 X-RAY SM INT F-THRU STD: CPT

## 2024-08-09 PROCEDURE — 1210000001 HC SEMI-PRIVATE ROOM DAILY

## 2024-08-09 PROCEDURE — 2500000004 HC RX 250 GENERAL PHARMACY W/ HCPCS (ALT 636 FOR OP/ED): Performed by: STUDENT IN AN ORGANIZED HEALTH CARE EDUCATION/TRAINING PROGRAM

## 2024-08-09 PROCEDURE — 2500000001 HC RX 250 WO HCPCS SELF ADMINISTERED DRUGS (ALT 637 FOR MEDICARE OP): Performed by: SURGERY

## 2024-08-09 PROCEDURE — 74240 X-RAY XM UPR GI TRC 1CNTRST: CPT | Performed by: RADIOLOGY

## 2024-08-09 PROCEDURE — 2500000004 HC RX 250 GENERAL PHARMACY W/ HCPCS (ALT 636 FOR OP/ED): Performed by: SURGERY

## 2024-08-09 PROCEDURE — 2720000007 HC OR 272 NO HCPCS

## 2024-08-09 PROCEDURE — 36415 COLL VENOUS BLD VENIPUNCTURE: CPT | Performed by: STUDENT IN AN ORGANIZED HEALTH CARE EDUCATION/TRAINING PROGRAM

## 2024-08-09 PROCEDURE — 85027 COMPLETE CBC AUTOMATED: CPT | Performed by: STUDENT IN AN ORGANIZED HEALTH CARE EDUCATION/TRAINING PROGRAM

## 2024-08-09 PROCEDURE — C1751 CATH, INF, PER/CENT/MIDLINE: HCPCS

## 2024-08-09 PROCEDURE — 2500000005 HC RX 250 GENERAL PHARMACY W/O HCPCS: Performed by: INTERNAL MEDICINE

## 2024-08-09 PROCEDURE — 02HV33Z INSERTION OF INFUSION DEVICE INTO SUPERIOR VENA CAVA, PERCUTANEOUS APPROACH: ICD-10-PCS | Performed by: HOSPITALIST

## 2024-08-09 PROCEDURE — 93005 ELECTROCARDIOGRAM TRACING: CPT

## 2024-08-09 RX ORDER — SUCRALFATE 1 G/1
1 TABLET ORAL EVERY 6 HOURS SCHEDULED
Status: DISPENSED | OUTPATIENT
Start: 2024-08-09

## 2024-08-09 RX ORDER — FLUCONAZOLE 2 MG/ML
400 INJECTION, SOLUTION INTRAVENOUS EVERY 24 HOURS
Status: DISPENSED | OUTPATIENT
Start: 2024-08-09

## 2024-08-09 RX ORDER — LIDOCAINE HYDROCHLORIDE 10 MG/ML
5 INJECTION INFILTRATION; PERINEURAL ONCE
Status: COMPLETED | OUTPATIENT
Start: 2024-08-09 | End: 2024-08-09

## 2024-08-09 RX ORDER — HYDROMORPHONE HYDROCHLORIDE 1 MG/ML
1 INJECTION, SOLUTION INTRAMUSCULAR; INTRAVENOUS; SUBCUTANEOUS ONCE
Status: COMPLETED | OUTPATIENT
Start: 2024-08-09 | End: 2024-08-09

## 2024-08-09 ASSESSMENT — COGNITIVE AND FUNCTIONAL STATUS - GENERAL
MOBILITY SCORE: 24
DAILY ACTIVITIY SCORE: 24
DAILY ACTIVITIY SCORE: 24
MOBILITY SCORE: 24

## 2024-08-09 ASSESSMENT — PAIN - FUNCTIONAL ASSESSMENT
PAIN_FUNCTIONAL_ASSESSMENT: 0-10

## 2024-08-09 ASSESSMENT — LIFESTYLE VARIABLES: TOTAL_SCORE: 6

## 2024-08-09 ASSESSMENT — PAIN SCALES - GENERAL
PAINLEVEL_OUTOF10: 10 - WORST POSSIBLE PAIN
PAINLEVEL_OUTOF10: 4
PAINLEVEL_OUTOF10: 10 - WORST POSSIBLE PAIN
PAINLEVEL_OUTOF10: 7
PAINLEVEL_OUTOF10: 5 - MODERATE PAIN
PAINLEVEL_OUTOF10: 10 - WORST POSSIBLE PAIN
PAINLEVEL_OUTOF10: 10 - WORST POSSIBLE PAIN
PAINLEVEL_OUTOF10: 8
PAINLEVEL_OUTOF10: 10 - WORST POSSIBLE PAIN
PAINLEVEL_OUTOF10: 10 - WORST POSSIBLE PAIN
PAINLEVEL_OUTOF10: 6
PAINLEVEL_OUTOF10: 10 - WORST POSSIBLE PAIN

## 2024-08-09 ASSESSMENT — PAIN DESCRIPTION - LOCATION
LOCATION: BACK
LOCATION: ABDOMEN

## 2024-08-09 ASSESSMENT — PAIN DESCRIPTION - ORIENTATION: ORIENTATION: RIGHT

## 2024-08-09 NOTE — PROCEDURES
Single Lumen 3 Fr PICC inserted in right basilic using 3CG and Sherlock technology. Length 40 cm 1 cm external.    Pre-Procedure Checklist:  Emergent Line Insertion: No  Type of Line to be Placed: PICC  Consent Obtained: Yes  Emergency Medication Necessary: No  Patient Identified with 2 Independent Identifiers: Yes  Review of Allergies, Anticoagulation, Relevant Labs, ECG/Telemetry: Yes  Risks/Benefits/Alternatives Discussed with Patient/POA/Legal Representative: Yes  Stop Sign on Door: Yes  Time Out Performed: Yes  Catheter Exchange: No    Positioning Checklist:  All People, Including Patient, in the Room with Cap and Mask: Yes   Fluoroscopy Used to Identify Vessel and Guide Insertion: Yes   Sterile Cover Used: Yes   Full Barrier Precautions Followed (Mask, Cap, Gown, Gloves): Yes   Hands Washed: Yes   Monitors Attached with Sound Alarms On: Yes  Full Body Sterile Drape (Head-to-Toe) Used to Cover Patient: Yes   Trendelenburg Position (For IJ and Subclavian): No   CHG Skin Prep Used and Allowed to Air Dry to Skin Procedure: Yes     Procedure Checklist:  Blood Aspirated From All Lumens, All Ports Subsequently Flushed: Yes   Catheter Caps Placed on All Lumens; Lumens Clamped: Yes   Maintain Guidewire Control Throughout, Ensuring Guidewire Removal: Yes   Maintain Sterile Field Throughout Insertion: Yes   Catheter Secured: Yes   Confirmatory Test of Venous Placement: Non-Pulsatile Blood     Post Procedure Checklist:  Date and Time Written on Dressing: Yes   Sharp and Wire Count and Safe Disposal of all Sharps/Wires: Yes   Sterile Dressing Applied Per Protocol: Yes   X-ray Ordered or ECG Image: Yes     PICC Insertion Details:  See LDA for further details  PICC line expires in 1 year  Additional Details: Line was inserted using Modified Seldinger's Technique.   Placed by: Nadya Murray RN  PICC ready for immediate use.

## 2024-08-09 NOTE — PROGRESS NOTES
ADMISSION DATE: 8/5/2024  HOSPITAL DAY: 3    SUBJECTIVE:  Patient was seen at bedside.  Patient had PICC placed this afternoon.  She will get TPN.  She is tolerating clear liquids for now.  Patient had a upper GI fluoroscopy small bowel follow-through which did show multiple duodenal ulcers contained perforation without extravasation of fluid into the abdomen.  General surgery is on the case.  Patient was tearful does not want to go back to the [facility], as per her statement she was mistreated, everybody thought she was lying.    OBJECTIVE:  Vitals:    08/08/24 0436 08/08/24 1416 08/08/24 1957 08/09/24 1438   BP: 110/61 128/79 129/86 124/73   BP Location: Right arm      Patient Position: Lying  Sitting    Pulse: 104 99 91    Resp: 18 18 19    Temp: 36.4 °C (97.5 °F) 37.5 °C (99.5 °F) 37.1 °C (98.8 °F) 36.9 °C (98.4 °F)   TempSrc: Temporal      SpO2: 97% 99% 100% 99%   Weight:       Height:            Intake/Output Summary (Last 24 hours) at 8/9/2024 1729  Last data filed at 8/9/2024 1057  Gross per 24 hour   Intake 1320 ml   Output --   Net 1320 ml      PHYSICAL EXAM:  Gen: Alert, awake, Oriented X 3. Not in any acute distress   HEENT:  Atraumatic, PERRL.  Conjunctivae clear.   Moist nasal mucous membranes. oropharynx non erythematous,   Neck:  Supple without thyromegaly or lymphadenopathy.  Lungs:  Clear to auscultation without rales, rhonchi, or rub.  Heart:  RRR, S1, S2, without M.  Abdomen:  Soft, non tender, no organ enlargement, bruit. Bowel sounds present . No CVA tenderness.  Extremities:  No edema. No calf swelling or tenderness.    Skin:  No rash, ecchymosis or erythema.    CURRENT ACTIVE MEDS:  cefTRIAXone, 1 g, intravenous, q24h  fluconazole, 400 mg, intravenous, q24h  metroNIDAZOLE, 500 mg, intravenous, q8h  pantoprazole, 40 mg, intravenous, BID  sucralfate, 1 g, oral, q6h LÓPEZ    LAB RESULTS:   CBC:   Results from last 7 days   Lab Units 08/09/24  1011 08/08/24  0553 08/07/24  0711   WBC AUTO x10*3/uL 7.3  8.8 6.9   RBC AUTO x10*6/uL 3.44* 3.59* 2.21*   HEMOGLOBIN g/dL 9.7* 10.0* 6.6*   HEMATOCRIT % 29.0* 29.2* 19.1*   MCV fL 84 81 86   MCH pg 28.2 27.9 29.9   MCHC g/dL 33.4 34.2 34.6   RDW % 18.3* 17.6* 15.6*   PLATELETS AUTO x10*3/uL 268 254 217     CMP:    Results from last 7 days   Lab Units 08/08/24  0553 08/07/24  0711 08/05/24  2238   SODIUM mmol/L 138 139 133*   POTASSIUM mmol/L 3.7 3.4* 3.5   CHLORIDE mmol/L 107 109* 100   CO2 mmol/L 24 24 24   BUN mg/dL 9 16 30*   CREATININE mg/dL 0.70 0.64 0.64   GLUCOSE mg/dL 95 79 93   PROTEIN TOTAL g/dL  --   --  5.6*   CALCIUM mg/dL 7.8* 7.5* 7.9*   BILIRUBIN TOTAL mg/dL  --   --  0.2   ALK PHOS U/L  --   --  45   AST U/L  --   --  29   ALT U/L  --   --  15     BMP:    Results from last 7 days   Lab Units 08/08/24 0553 08/07/24  0711 08/05/24  2238   SODIUM mmol/L 138 139 133*   POTASSIUM mmol/L 3.7 3.4* 3.5   CHLORIDE mmol/L 107 109* 100   CO2 mmol/L 24 24 24   BUN mg/dL 9 16 30*   CREATININE mg/dL 0.70 0.64 0.64   CALCIUM mg/dL 7.8* 7.5* 7.9*   GLUCOSE mg/dL 95 79 93      IMAGING STUDIES:  === 08/05/24 ===  XR CHEST 1 VIEW  No acute cardiopulmonary process.    === 08/05/24 ===  CT CERVICAL SPINE WO IV CONTRAST    CT HEAD:  1. No acute intracranial abnormality or calvarial fracture.    CT CERVICAL SPINE:  1. No acute fracture or traumatic malalignment of the cervical spine.      8/9/2024  FL Upper GI Single Contrast with Small Bowel Follow Through   No free intraperitoneal air has developed   Water-soluble contrast leaked out of the two duodenal bulb ulcers on   opposing sides of the bulb but no water-soluble contrast leaked   freely away from either of those two collections    Third, more distal duodenal ulcer also feeding into the larger of the   two collections (based on prior CT but not definitively on today's   fluoroscopic exam) is in a location difficult to fluoroscopically   image was not well depicted on this fluoroscopic exam       === 08/05/24 ===  US RIGHT  UPPER QUADRANT  The gallbladder is contracted, which limits evaluation. No  sonographic evidence of cholelithiasis. Nonspecific heterogeneous  appearance of the liver. Correlate with LFTs.    LAST EKG  Encounter Date: 08/05/24   ECG 12 lead   Result Value    Ventricular Rate 107    Atrial Rate 107    DE Interval 126    QRS Duration 78    QT Interval 346    QTC Calculation(Bazett) 461    P Axis 66    R Axis 83    T Axis 60    QRS Count 18    Q Onset 222    P Onset 159    P Offset 202    T Offset 395    QTC Fredericia 419       PROBLEMS ON ADMISSION:  GI bleeding [K92.2]    HOSPITAL PROBLEM LIST     Duodenal ulcer    Perforated ulcer of intestine (Multi)    Acute blood loss anemia    Acute gastritis with hemorrhage     ASSESSMENT AND PLAN FOR 8/9/2024  Patient has lower abdominal pain however much more tolerable at this time.  She is also tolerating clear liquids.  Had 1 black tarry stool since this morning.  She had some contained perforation of the duodenal ulcer.  She got PICC.  TPN will be started tonight.  She will get some bowel rest and imaging will be done again in 2 to 3 days.  Will continue with IV fluconazole, metronidazole and ceftriaxone for now.  Appreciate consult recommendation from general surgery.  Pain is being managed with IV Dilaudid as needed.

## 2024-08-09 NOTE — PROGRESS NOTES
"Lisa Montemayor is a 28 y.o. female on day 3 of admission presenting with GI bleeding.    Subjective   Still having pain but improving, only RUQ, denies any hematachezia       Objective     Physical Exam  Gen: NAD, Aax3  Abd; Soft, ND, TTP in RUQ  Last Recorded Vitals  Blood pressure 129/86, pulse 91, temperature 37.1 °C (98.8 °F), resp. rate 19, height 1.6 m (5' 3\"), weight 57.7 kg (127 lb 3.3 oz), SpO2 100%.  Intake/Output last 3 Shifts:  I/O last 3 completed shifts:  In: 8963.8 (155.4 mL/kg) [I.V.:1751.3 (30.4 mL/kg); Blood:7212.5]  Out: - (0 mL/kg)   Weight: 57.7 kg     Relevant Results                Component      Latest Ref Rng 8/8/2024   LEUKOCYTES (10*3/UL) IN BLOOD BY AUTOMATED COUNT, Gabonese      4.4 - 11.3 x10*3/uL 8.8    nRBC      0.0 - 0.0 /100 WBCs 0.0    ERYTHROCYTES (10*6/UL) IN BLOOD BY AUTOMATED COUNT, [place]onese      4.00 - 5.20 x10*6/uL 3.59 (L)    HEMOGLOBIN      12.0 - 16.0 g/dL 10.0 (L)    HEMATOCRIT      36.0 - 46.0 % 29.2 (L)    MCV      80 - 100 fL 81    MCH      26.0 - 34.0 pg 27.9    MCHC      32.0 - 36.0 g/dL 34.2    RED CELL DISTRIBUTION WIDTH      11.5 - 14.5 % 17.6 (H)    PLATELETS (10*3/UL) IN BLOOD AUTOMATED COUNT, [place]onese      150 - 450 x10*3/uL 254    GLUCOSE      74 - 99 mg/dL 95    SODIUM      136 - 145 mmol/L 138    POTASSIUM      3.5 - 5.3 mmol/L 3.7    CHLORIDE      98 - 107 mmol/L 107    Bicarbonate      21 - 32 mmol/L 24    Anion Gap      10 - 20 mmol/L 11    Blood Urea Nitrogen      6 - 23 mg/dL 9    Creatinine      0.50 - 1.05 mg/dL 0.70    EGFR      >60 mL/min/1.73m*2 >90    Calcium      8.6 - 10.3 mg/dL 7.8 (L)      Component      Latest Ref Rn 8/9/2024   LEUKOCYTES (10*3/UL) IN BLOOD BY AUTOMATED COUNT, [place]onese      4.4 - 11.3 x10*3/uL 7.3    nRBC      0.0 - 0.0 /100 WBCs 0.0    ERYTHROCYTES (10*6/UL) IN BLOOD BY AUTOMATED COUNT, [place]e      4.00 - 5.20 x10*6/uL 3.44 (L)    HEMOGLOBIN      12.0 - 16.0 g/dL 9.7 (L)    HEMATOCRIT      36.0 - 46.0 % 29.0 (L)  "   MCV      80 - 100 fL 84    MCH      26.0 - 34.0 pg 28.2    MCHC      32.0 - 36.0 g/dL 33.4    RED CELL DISTRIBUTION WIDTH      11.5 - 14.5 % 18.3 (H)    PLATELETS (10*3/UL) IN BLOOD AUTOMATED COUNT, Portuguese      150 - 450 x10*3/uL 268    GLUCOSE      74 - 99 mg/dL    SODIUM      136 - 145 mmol/L    POTASSIUM      3.5 - 5.3 mmol/L    CHLORIDE      98 - 107 mmol/L    Bicarbonate      21 - 32 mmol/L    Anion Gap      10 - 20 mmol/L    Blood Urea Nitrogen      6 - 23 mg/dL    Creatinine      0.50 - 1.05 mg/dL    EGFR      >60 mL/min/1.73m*2    Calcium      8.6 - 10.3 mg/dL       Legend:  (L) Low  (H) High         UGI - IMPRESSION:  No free intraperitoneal air has developed      Water-soluble contrast leaked out of the two duodenal bulb ulcers on  opposing sides of the bulb but no water-soluble contrast leaked  freely away from either of those two collections      Third, more distal duodenal ulcer also feeding into the larger of the  two collections (based on prior CT but not definitively on today's  fluoroscopic exam) is in a location difficult to fluoroscopically  image was not well depicted on this fluoroscopic exam        Assessment/Plan   Principal Problem:    GI bleeding  Active Problems:    Duodenal ulcer    Perforated ulcer of intestine (Multi)    Acute blood loss anemia    Acute gastritis with hemorrhage    29 y/o female who presents with UGI bleed and multiple duodenal ulcers with contained perforation, today demonstrates persistent ulcerations with contained perforation without any free extravasation of fluid into the abdomen.  Based on these images, the recommendation is for the patient to remain on IV antibitoics with fungal coverage.  Recommend keeping IP with TPN/PPN added on.  OK for CLD but no diet advancement until reimage early next week.  -CLD  -IVF  -TPN/PPN  -IV abx  -OOB, Scds, iSS, DVT proph  -Cont. Care per primary team       I spent 49 minutes in the professional and overall care of this  patient.      Goran Escobar MD

## 2024-08-09 NOTE — PROGRESS NOTES
Nutrition Note:  RDN consult received for TPN/PPN recommendations. Pt currently only has peripheral line, discussing PICC line placement with pt per surgeon.   Pt seen by RDN 8/7, please see assessment for more details.     Recommend starting PPN 4.25/5 at 55 ml/hr to provide 1320 total volume, 56 g protein and 449 kcal - meets 100% of estimated protein needs and 28% estimated kcal needs.     Once PICC line is placed recommend CPN 5/20 at 50 ml/hr to provide 1200 ml total volume, 60 g protein and 1056 kcal. Recommend 20% Liposyn at 10 ml/hr x 12 hours to provide an additional 240 kcal. CPN at 50 ml/hr with lipids will meet 100% estimated protein needs and 82% estimated kcal needs.     Full nutrition re-assessment to be completed 8/12.

## 2024-08-09 NOTE — NURSING NOTE
Order received for a PICC line, sent for the pt and call received stating the pt wants to talk to the ordering doctor prior to coming down to IR for this procedure. Transport canceled at this time.

## 2024-08-09 NOTE — CARE PLAN
"The patient's goals for the shift include \"to be able to eat or drink something\"    The clinical goals for the shift include pain control    Problem: Pain - Adult  Goal: Verbalizes/displays adequate comfort level or baseline comfort level  Outcome: Progressing     Problem: Safety - Adult  Goal: Free from fall injury  Outcome: Progressing       "

## 2024-08-09 NOTE — NURSING NOTE
Patient stated she would have a guest coming with food but insisted that it would be his dinner and not for her. Educated patient about the importance of remaining NPO and why she is ordered NPO currently. When guest arrived at bedside had bag of food from [employer]. Patient repeatedly states that food is for guest and that she would not be eating any of it. Upon entering her room, saw patient with bag of food on bed reaching into bag. Pt quickly handed bag off to guest. Upon entering her room shortly after saw empty food wrapper crinkled up on patients bed. Re-educated patient about importance of remaining NPO and why she is currently not to be eating. Patient states that she understands.

## 2024-08-10 LAB
ALBUMIN SERPL BCP-MCNC: 2.9 G/DL (ref 3.4–5)
ALP SERPL-CCNC: 37 U/L (ref 33–110)
ALT SERPL W P-5'-P-CCNC: 10 U/L (ref 7–45)
ANION GAP SERPL CALC-SCNC: 7 MMOL/L (ref 10–20)
AST SERPL W P-5'-P-CCNC: 8 U/L (ref 9–39)
BASOPHILS # BLD AUTO: 0.02 X10*3/UL (ref 0–0.1)
BASOPHILS NFR BLD AUTO: 0.3 %
BILIRUB SERPL-MCNC: 0.2 MG/DL (ref 0–1.2)
BUN SERPL-MCNC: 7 MG/DL (ref 6–23)
CALCIUM SERPL-MCNC: 7.8 MG/DL (ref 8.6–10.3)
CHLORIDE SERPL-SCNC: 111 MMOL/L (ref 98–107)
CO2 SERPL-SCNC: 25 MMOL/L (ref 21–32)
CREAT SERPL-MCNC: 0.69 MG/DL (ref 0.5–1.05)
EGFRCR SERPLBLD CKD-EPI 2021: >90 ML/MIN/1.73M*2
EOSINOPHIL # BLD AUTO: 0.31 X10*3/UL (ref 0–0.7)
EOSINOPHIL NFR BLD AUTO: 4.1 %
ERYTHROCYTE [DISTWIDTH] IN BLOOD BY AUTOMATED COUNT: 18.1 % (ref 11.5–14.5)
GLUCOSE SERPL-MCNC: 109 MG/DL (ref 74–99)
HCT VFR BLD AUTO: 29 % (ref 36–46)
HGB BLD-MCNC: 9.2 G/DL (ref 12–16)
IMM GRANULOCYTES # BLD AUTO: 0.02 X10*3/UL (ref 0–0.7)
IMM GRANULOCYTES NFR BLD AUTO: 0.3 % (ref 0–0.9)
LYMPHOCYTES # BLD AUTO: 2.05 X10*3/UL (ref 1.2–4.8)
LYMPHOCYTES NFR BLD AUTO: 27.1 %
MAGNESIUM SERPL-MCNC: 1.94 MG/DL (ref 1.6–2.4)
MCH RBC QN AUTO: 27.6 PG (ref 26–34)
MCHC RBC AUTO-ENTMCNC: 31.7 G/DL (ref 32–36)
MCV RBC AUTO: 87 FL (ref 80–100)
MONOCYTES # BLD AUTO: 0.51 X10*3/UL (ref 0.1–1)
MONOCYTES NFR BLD AUTO: 6.7 %
NEUTROPHILS # BLD AUTO: 4.65 X10*3/UL (ref 1.2–7.7)
NEUTROPHILS NFR BLD AUTO: 61.5 %
NRBC BLD-RTO: 0 /100 WBCS (ref 0–0)
PLATELET # BLD AUTO: 275 X10*3/UL (ref 150–450)
POTASSIUM SERPL-SCNC: 4.4 MMOL/L (ref 3.5–5.3)
PROT SERPL-MCNC: 4.6 G/DL (ref 6.4–8.2)
RBC # BLD AUTO: 3.33 X10*6/UL (ref 4–5.2)
SODIUM SERPL-SCNC: 139 MMOL/L (ref 136–145)
WBC # BLD AUTO: 7.6 X10*3/UL (ref 4.4–11.3)

## 2024-08-10 PROCEDURE — 83735 ASSAY OF MAGNESIUM: CPT | Performed by: INTERNAL MEDICINE

## 2024-08-10 PROCEDURE — 2500000004 HC RX 250 GENERAL PHARMACY W/ HCPCS (ALT 636 FOR OP/ED): Performed by: REGISTERED NURSE

## 2024-08-10 PROCEDURE — 80053 COMPREHEN METABOLIC PANEL: CPT | Performed by: INTERNAL MEDICINE

## 2024-08-10 PROCEDURE — 85025 COMPLETE CBC W/AUTO DIFF WBC: CPT | Performed by: INTERNAL MEDICINE

## 2024-08-10 PROCEDURE — 2500000004 HC RX 250 GENERAL PHARMACY W/ HCPCS (ALT 636 FOR OP/ED): Performed by: STUDENT IN AN ORGANIZED HEALTH CARE EDUCATION/TRAINING PROGRAM

## 2024-08-10 PROCEDURE — 2500000004 HC RX 250 GENERAL PHARMACY W/ HCPCS (ALT 636 FOR OP/ED): Performed by: SURGERY

## 2024-08-10 PROCEDURE — 99231 SBSQ HOSP IP/OBS SF/LOW 25: CPT | Performed by: SURGERY

## 2024-08-10 PROCEDURE — 2500000004 HC RX 250 GENERAL PHARMACY W/ HCPCS (ALT 636 FOR OP/ED): Performed by: INTERNAL MEDICINE

## 2024-08-10 PROCEDURE — C9113 INJ PANTOPRAZOLE SODIUM, VIA: HCPCS | Performed by: INTERNAL MEDICINE

## 2024-08-10 PROCEDURE — 1210000001 HC SEMI-PRIVATE ROOM DAILY

## 2024-08-10 PROCEDURE — 2500000005 HC RX 250 GENERAL PHARMACY W/O HCPCS: Performed by: REGISTERED NURSE

## 2024-08-10 PROCEDURE — 99233 SBSQ HOSP IP/OBS HIGH 50: CPT | Performed by: INTERNAL MEDICINE

## 2024-08-10 PROCEDURE — 2500000001 HC RX 250 WO HCPCS SELF ADMINISTERED DRUGS (ALT 637 FOR MEDICARE OP): Performed by: SURGERY

## 2024-08-10 RX ORDER — HYDROMORPHONE HYDROCHLORIDE 1 MG/ML
1 INJECTION, SOLUTION INTRAMUSCULAR; INTRAVENOUS; SUBCUTANEOUS EVERY 4 HOURS PRN
Status: DISCONTINUED | OUTPATIENT
Start: 2024-08-10 | End: 2024-08-11

## 2024-08-10 RX ORDER — DIAZEPAM 5 MG/ML
10 INJECTION, SOLUTION INTRAMUSCULAR; INTRAVENOUS ONCE
Status: COMPLETED | OUTPATIENT
Start: 2024-08-10 | End: 2024-08-11

## 2024-08-10 ASSESSMENT — PAIN SCALES - GENERAL
PAINLEVEL_OUTOF10: 10 - WORST POSSIBLE PAIN
PAINLEVEL_OUTOF10: 5 - MODERATE PAIN
PAINLEVEL_OUTOF10: 10 - WORST POSSIBLE PAIN
PAINLEVEL_OUTOF10: 3
PAINLEVEL_OUTOF10: 10 - WORST POSSIBLE PAIN

## 2024-08-10 ASSESSMENT — COGNITIVE AND FUNCTIONAL STATUS - GENERAL
DAILY ACTIVITIY SCORE: 24
CLIMB 3 TO 5 STEPS WITH RAILING: A LITTLE
MOBILITY SCORE: 23

## 2024-08-10 ASSESSMENT — PAIN - FUNCTIONAL ASSESSMENT
PAIN_FUNCTIONAL_ASSESSMENT: 0-10
PAIN_FUNCTIONAL_ASSESSMENT: 0-10
PAIN_FUNCTIONAL_ASSESSMENT: FLACC (FACE, LEGS, ACTIVITY, CRY, CONSOLABILITY)
PAIN_FUNCTIONAL_ASSESSMENT: 0-10
PAIN_FUNCTIONAL_ASSESSMENT: FLACC (FACE, LEGS, ACTIVITY, CRY, CONSOLABILITY)
PAIN_FUNCTIONAL_ASSESSMENT: 0-10

## 2024-08-10 ASSESSMENT — PAIN DESCRIPTION - LOCATION: LOCATION: ABDOMEN

## 2024-08-10 NOTE — CARE PLAN
The patient's goals for the shift include Labs WNL    The clinical goals for the shift include Labs WNL      Problem: Skin  Goal: Prevent/manage excess moisture  8/10/2024 0610 by Ariadna Trotter RN  Outcome: Progressing  8/9/2024 2044 by Ariadna Trotter RN  Flowsheets (Taken 8/9/2024 2044)  Prevent/manage excess moisture:   Use wicking fabric (obtain order)   Moisturize dry skin   Cleanse incontinence/protect with barrier cream   Monitor for/manage infection if present   Follow provider orders for dressing changes  Goal: Prevent/minimize sheer/friction injuries  8/10/2024 0610 by Ariadna Trotter RN  Outcome: Progressing  8/9/2024 2044 by Ariadna Trotter RN  Flowsheets (Taken 8/9/2024 2044)  Prevent/minimize sheer/friction injuries:   Utilize specialty bed per algorithm   Use pull sheet   Turn/reposition every 2 hours/use positioning/transfer devices   Increase activity/out of bed for meals   HOB 30 degrees or less   Complete micro-shifts as needed if patient unable. Adjust patient position to relieve pressure points, not a full turn     Problem: Pain - Adult  Goal: Verbalizes/displays adequate comfort level or baseline comfort level  Outcome: Progressing     Problem: Safety - Adult  Goal: Free from fall injury  Outcome: Progressing     Problem: Discharge Planning  Goal: Discharge to home or other facility with appropriate resources  Outcome: Progressing     Problem: Fall/Injury  Goal: Not fall by end of shift  Outcome: Progressing  Goal: Be free from injury by end of the shift  Outcome: Progressing  Goal: Verbalize understanding of personal risk factors for fall in the hospital  Outcome: Progressing  Goal: Verbalize understanding of risk factor reduction measures to prevent injury from fall in the home  Outcome: Progressing  Goal: Use assistive devices by end of the shift  Outcome: Progressing  Goal: Pace activities to prevent fatigue by end of the shift  Outcome: Progressing     Problem: Pain  Goal:  Takes deep breaths with improved pain control throughout the shift  Outcome: Progressing  Goal: Turns in bed with improved pain control throughout the shift  Outcome: Progressing  Goal: Walks with improved pain control throughout the shift  Outcome: Progressing  Goal: Performs ADL's with improved pain control throughout shift  Outcome: Progressing  Goal: Participates in PT with improved pain control throughout the shift  Outcome: Progressing  Goal: Free from opioid side effects throughout the shift  Outcome: Progressing  Goal: Free from acute confusion related to pain meds throughout the shift  Outcome: Progressing

## 2024-08-10 NOTE — CARE PLAN
The patient's goals for the shift include pain cantrol    The clinical goals for the shift include maintain safety and control pain    Over the shift, the patient did not make progress toward the following goals.    Problem: Pain  Goal: Takes deep breaths with improved pain control throughout the shift  Outcome: Not Progressing  Goal: Turns in bed with improved pain control throughout the shift  Outcome: Not Progressing  Goal: Walks with improved pain control throughout the shift  Outcome: Not Progressing  Goal: Performs ADL's with improved pain control throughout shift  Outcome: Not Progressing  Goal: Participates in PT with improved pain control throughout the shift  Outcome: Not Progressing  Goal: Free from opioid side effects throughout the shift  Outcome: Not Progressing  Goal: Free from acute confusion related to pain meds throughout the shift  Outcome: Not Progressing

## 2024-08-10 NOTE — PROGRESS NOTES
General Surgery Progress Note    Patient: Lisa Montemayor  Unit/Bed: 1107/1107-A  YOB: 1995  MRN: [id number]  Acct: [id number]   Admitting Diagnosis: GI bleeding [K92.2]  Date:  8/5/2024  Hospital Day: 4  Attending: Jeet Braga MD    Complaint:  Chief Complaint   Patient presents with    Black or Bloody Stool      Subjective  Patient seen and examined this morning. No acute events overnight. Patient denies nausea and vomiting. She states she is in a lot of pain and describes in in the middle of her chest radiating down under right rib cage down her abdomen into right hip. Patient states she is passing gas and she did have a BM this morning and when she wiped there was blood on toilet paper.     PHYSICAL EXAM:  Physical Exam  Vitals reviewed.   Constitutional:       General: She is not in acute distress.  HENT:      Head: Normocephalic.      Nose: Nose normal.      Mouth/Throat:      Mouth: Mucous membranes are moist.   Cardiovascular:      Rate and Rhythm: Normal rate.   Pulmonary:      Effort: Pulmonary effort is normal.   Abdominal:      General: Abdomen is flat. Bowel sounds are normal.      Palpations: Abdomen is soft.      Tenderness: There is abdominal tenderness in the right upper quadrant, right lower quadrant and epigastric area.   Musculoskeletal:         General: Normal range of motion.      Cervical back: Normal range of motion.   Skin:     General: Skin is warm.      Capillary Refill: Capillary refill takes less than 2 seconds.   Neurological:      General: No focal deficit present.      Mental Status: She is alert and oriented to person, place, and time.   Psychiatric:         Mood and Affect: Mood normal.       Vital signs in last 24 hours:  Vitals:    08/10/24 0425   BP: 116/70   Pulse: 95   Resp: 19   Temp: 36.2 °C (97.2 °F)   SpO2: 99%     Intake/Output this shift:    Intake/Output Summary (Last 24 hours) at 8/10/2024 0850  Last data filed at 8/10/2024 0836  Gross per 24 hour    Intake 3353.75 ml   Output 750 ml   Net 2603.75 ml      Allergies:  Allergies   Allergen Reactions    Amoxicillin Hives    Penicillins Hives      Medications:  Scheduled medications  cefTRIAXone, 1 g, intravenous, q24h  fluconazole, 400 mg, intravenous, q24h  metroNIDAZOLE, 500 mg, intravenous, q8h  pantoprazole, 40 mg, intravenous, BID  sucralfate, 1 g, oral, q6h LÓPEZ      Continuous medications  potassium chloride in 0.9%NaCl, 75 mL/hr, Last Rate: 75 mL/hr (08/09/24 2039)      PRN medications  PRN medications: alteplase, diphenhydrAMINE, HYDROmorphone, LORazepam, ondansetron **OR** ondansetron  Labs:  Results for orders placed or performed during the hospital encounter of 08/05/24 (from the past 24 hour(s))   SST TOP   Result Value Ref Range    Extra Tube Hold for add-ons.    PST Top   Result Value Ref Range    Extra Tube Hold for add-ons.    CBC   Result Value Ref Range    WBC 7.3 4.4 - 11.3 x10*3/uL    nRBC 0.0 0.0 - 0.0 /100 WBCs    RBC 3.44 (L) 4.00 - 5.20 x10*6/uL    Hemoglobin 9.7 (L) 12.0 - 16.0 g/dL    Hematocrit 29.0 (L) 36.0 - 46.0 %    MCV 84 80 - 100 fL    MCH 28.2 26.0 - 34.0 pg    MCHC 33.4 32.0 - 36.0 g/dL    RDW 18.3 (H) 11.5 - 14.5 %    Platelets 268 150 - 450 x10*3/uL   ECG 12 lead   Result Value Ref Range    Ventricular Rate 107 BPM    Atrial Rate 107 BPM    MS Interval 126 ms    QRS Duration 78 ms    QT Interval 346 ms    QTC Calculation(Bazett) 461 ms    P Axis 66 degrees    R Axis 83 degrees    T Axis 60 degrees    QRS Count 18 beats    Q Onset 222 ms    P Onset 159 ms    P Offset 202 ms    T Offset 395 ms    QTC Fredericia 419 ms   CBC and Auto Differential   Result Value Ref Range    WBC 7.6 4.4 - 11.3 x10*3/uL    nRBC 0.0 0.0 - 0.0 /100 WBCs    RBC 3.33 (L) 4.00 - 5.20 x10*6/uL    Hemoglobin 9.2 (L) 12.0 - 16.0 g/dL    Hematocrit 29.0 (L) 36.0 - 46.0 %    MCV 87 80 - 100 fL    MCH 27.6 26.0 - 34.0 pg    MCHC 31.7 (L) 32.0 - 36.0 g/dL    RDW 18.1 (H) 11.5 - 14.5 %    Platelets 275 150 -  450 x10*3/uL    Neutrophils % 61.5 40.0 - 80.0 %    Immature Granulocytes %, Automated 0.3 0.0 - 0.9 %    Lymphocytes % 27.1 13.0 - 44.0 %    Monocytes % 6.7 2.0 - 10.0 %    Eosinophils % 4.1 0.0 - 6.0 %    Basophils % 0.3 0.0 - 2.0 %    Neutrophils Absolute 4.65 1.20 - 7.70 x10*3/uL    Immature Granulocytes Absolute, Automated 0.02 0.00 - 0.70 x10*3/uL    Lymphocytes Absolute 2.05 1.20 - 4.80 x10*3/uL    Monocytes Absolute 0.51 0.10 - 1.00 x10*3/uL    Eosinophils Absolute 0.31 0.00 - 0.70 x10*3/uL    Basophils Absolute 0.02 0.00 - 0.10 x10*3/uL   Comprehensive Metabolic Panel   Result Value Ref Range    Glucose 109 (H) 74 - 99 mg/dL    Sodium 139 136 - 145 mmol/L    Potassium 4.4 3.5 - 5.3 mmol/L    Chloride 111 (H) 98 - 107 mmol/L    Bicarbonate 25 21 - 32 mmol/L    Anion Gap 7 (L) 10 - 20 mmol/L    Urea Nitrogen 7 6 - 23 mg/dL    Creatinine 0.69 0.50 - 1.05 mg/dL    eGFR >90 >60 mL/min/1.73m*2    Calcium 7.8 (L) 8.6 - 10.3 mg/dL    Albumin 2.9 (L) 3.4 - 5.0 g/dL    Alkaline Phosphatase 37 33 - 110 U/L    Total Protein 4.6 (L) 6.4 - 8.2 g/dL    AST 8 (L) 9 - 39 U/L    Bilirubin, Total 0.2 0.0 - 1.2 mg/dL    ALT 10 7 - 45 U/L   Magnesium   Result Value Ref Range    Magnesium 1.94 1.60 - 2.40 mg/dL      Imaging:  Bedside PICC Imaging    Result Date: 8/9/2024  These images are not reportable by radiology and will not be interpreted by  Radiologists.    ECG 12 lead    Result Date: 8/9/2024  Sinus tachycardia Otherwise normal ECG No previous ECGs available See ED provider note for full interpretation and clinical correlation    FL upper GI single contrast w small bowel follow through    Result Date: 8/9/2024  Interpreted By:  Ozzie Duran, STUDY: FL UPPER GI SINGLE CONTRAST W SMALL BOWEL FOLLOW THROUGH;  8/9/2024 11:58 am   INDICATION: Signs/Symptoms:see if contained leak has sealed.   COMPARISON: CTA abdomen and pelvis 5 August 2024   ACCESSION NUMBER(S): [id number]   ORDERING CLINICIAN: COMPA [place]   TECHNIQUE:  Monophasic water-soluble upper GI series   Fluoroscopy time: 3 Minutes, 30 seconds   FINDINGS: Before beginning the exam I obtained multiple  radiographs to confirm the patient has not developed free intraperitoneal air since recent CT. No free intraperitoneal air   Water-soluble contrast passed from the stomach into the proximal duodenum and leaked out of two separate more proximal duodenal bulb ulcers on opposing (medial and superolateral) sides of the bulb. These two ulcers and their respective collections are well demonstrated on axial series 601, image 158 of recent CT abdomen and pelvis which shows gas and fluid in contained collections arising off opposing sides of the duodenal bulb. The larger collection is the one insinuating in the duodenal C-loop   The amount of water-soluble contrast gradually increased in both of the collections, but there was no free flow of water-soluble contrast away from the duodenum   Review of the recent CTA shows yet a third ulcer slightly more distal in the duodenum, the one I annotated on axial series 601, image 167 with a long arrow (not same series image 157 with the short arrow). No definite leak of contrast through this ulcer but review of prior CT confirms it does feed into the same larger of the two collections (the one insinuating in the duodenal C-loop)       No free intraperitoneal air has developed   Water-soluble contrast leaked out of the two duodenal bulb ulcers on opposing sides of the bulb but no water-soluble contrast leaked freely away from either of those two collections   Third, more distal duodenal ulcer also feeding into the larger of the two collections (based on prior CT but not definitively on today's fluoroscopic exam) is in a location difficult to fluoroscopically image was not well depicted on this fluoroscopic exam   MACRO: None   Signed by: Ozzie Duran 8/9/2024 12:11 PM Dictation workstation:   [id number]      Assessment  UGI Bleed  Multiple  duodenal ulcers    On exam abdomen is soft, non distended, and tenderness with palpation to the epigastric, RUQ and RLQ. Bowel sound present x 4 quadrants. Labs this morning show no leukocytosis, Hgb stable. Afebrile.     Plan  -Continue clear liquids until reimaging next week  -Continue TPN  -Continue IVF  -Continue IV ABX  -Pain control  -Nausea control  -DVT prophylaxis-SCDs recommend starting sub q heparin as Hgb is stable  -Pulmonary toileting   -Encourage ambulation  -Continue care per primary team       Further recommendations per Dr. Luz Krishnan, APRN-CNP    Time spent  37  minutes obtaining labs, imaging, recommendations, interview, assessment, examination, medication review/ordering, and EMR review.    Plan of care was discussed extensively with patient. Patient verbalized understanding through teach back method. All questions and concerns addressed upon examination.     Of note, this documentation is completed using the Dragon Dictation system (voice recognition software). There may be spelling and/or grammatical errors that were not corrected prior to final submission.

## 2024-08-10 NOTE — NURSING NOTE
Pt has requested to advance her diet. Surgery team said she needs to stay on her clear liquid diet for now. This nurse discussed this with pt and she is now asking family to bring her in food. Family has been updated that she needs to stay on current diet per the surgical team.    Update: pt was also educated on the risks of eating an advanced diet at this time. Pt stated she understood but still upset.

## 2024-08-10 NOTE — SIGNIFICANT EVENT
Spoke with bedside RN Jennifer in regards to patient wanting to eating food and asking family members to bring food in for her. Patient had RN and hospitalist educated patient on risks.     I went in to speak with the patient and had Afsaneh PCA chaperone conversation. I explained to the patient why she is on a clear liquid diet and that if she were to eat solid food that it could cause the perforation to become worse and lead her to not be able to drink anything and lead to possible surgery. I explained that she is more than welcome to leave AMA (as she kept saying she wants to leave that she can't keep doing this and doesn't care anymore) but she would likely end up back here in worse condition. I explained the risks of leaving and the potential for death.     After the discussion patient was upset but seemed to understand the risks. I told her that if she continues then she will be made NPO. I explained she will be getting TPN and lipids this evening. Patient states current pain regimen is not working so I upped her dilaudid from 0.06 to 1mg.     Of note patient did have a vape pen in bed with her this morning when I rounded.

## 2024-08-10 NOTE — PROGRESS NOTES
"ADMISSION DATE: 8/5/2024  HOSPITAL DAY: 4    SUBJECTIVE:  Patient was seen at bedside.  She has been very tearful, because she wants to eat.  Patient states ' I am starving to death.\"  Patient has been allowed to do clear liquid diets by general surgery because of the fear of worsening contained duodenal perforation.  Patient was explained why she is not given solid food.  However patient is getting mixed messages as she said ' how come then I got chocolate ice cream last night. '  Patient appears to be noncompliant.  [facility] and found to have vape pen in her bed.  She complains of abdominal pain.       OBJECTIVE:  Vitals:    08/09/24 1438 08/09/24 1945 08/10/24 0425 08/10/24 1309   BP: 124/73 138/85 116/70 122/64   BP Location: Right arm Left arm Left arm    Patient Position: Sitting Sitting Lying    Pulse: 108 (!) 116 95 (!) 113   Resp: 20 18 19 16   Temp: 36.9 °C (98.4 °F) 36.6 °C (97.9 °F) 36.2 °C (97.2 °F) 37 °C (98.6 °F)   TempSrc: Temporal Temporal Temporal    SpO2: 99% 97% 99% 98%   Weight:       Height:            Intake/Output Summary (Last 24 hours) at 8/10/2024 1803  Last data filed at 8/10/2024 1523  Gross per 24 hour   Intake 2753.75 ml   Output 750 ml   Net 2003.75 ml      PHYSICAL EXAM:  Gen: Alert, awake, Oriented X 3. Not in any acute distress   HEENT:  Atraumatic, PERRL.  Conjunctivae clear.   Moist nasal mucous membranes. oropharynx non erythematous,   Neck:  Supple without thyromegaly or lymphadenopathy.  Lungs:  Clear to auscultation without rales, rhonchi, or rub.  Heart:  RRR, S1, S2, without M.  Abdomen:  Soft, non tender, no organ enlargement, bruit. Bowel sounds present . No CVA tenderness.  Extremities:  No edema. No calf swelling or tenderness.    Skin:  No rash, ecchymosis or erythema.    CURRENT ACTIVE MEDS:  cefTRIAXone, 1 g, intravenous, q24h  fat emulsion-plant based, 10 mL, intravenous, Daily Lipids  fluconazole, 400 mg, intravenous, q24h  metroNIDAZOLE, 500 mg, " intravenous, q8h  pantoprazole, 40 mg, intravenous, BID  sucralfate, 1 g, oral, q6h [facility]    LAB RESULTS:   CBC:   Results from last 7 days   Lab Units 08/10/24  0529 08/09/24  1011 08/08/24  0553   WBC AUTO x10*3/uL 7.6 7.3 8.8   RBC AUTO x10*6/uL 3.33* 3.44* 3.59*   HEMOGLOBIN g/dL 9.2* 9.7* 10.0*   HEMATOCRIT % 29.0* 29.0* 29.2*   MCV fL 87 84 81   MCH pg 27.6 28.2 27.9   MCHC g/dL 31.7* 33.4 34.2   RDW % 18.1* 18.3* 17.6*   PLATELETS AUTO x10*3/uL 275 268 254     CMP:    Results from last 7 days   Lab Units 08/10/24  0529 08/08/24  0553 08/07/24  0711 08/05/24  2238   SODIUM mmol/L 139 138 139 133*   POTASSIUM mmol/L 4.4 3.7 3.4* 3.5   CHLORIDE mmol/L 111* 107 109* 100   CO2 mmol/L 25 24 24 24   BUN mg/dL 7 9 16 30*   CREATININE mg/dL 0.69 0.70 0.64 0.64   GLUCOSE mg/dL 109* 95 79 93   PROTEIN TOTAL g/dL 4.6*  --   --  5.6*   CALCIUM mg/dL 7.8* 7.8* 7.5* 7.9*   BILIRUBIN TOTAL mg/dL 0.2  --   --  0.2   ALK PHOS U/L 37  --   --  45   AST U/L 8*  --   --  29   ALT U/L 10  --   --  15     BMP:    Results from last 7 days   Lab Units 08/10/24  0529 08/08/24  0553 08/07/24  0711   SODIUM mmol/L 139 138 139   POTASSIUM mmol/L 4.4 3.7 3.4*   CHLORIDE mmol/L 111* 107 109*   CO2 mmol/L 25 24 24   BUN mg/dL 7 9 16   CREATININE mg/dL 0.69 0.70 0.64   CALCIUM mg/dL 7.8* 7.8* 7.5*   GLUCOSE mg/dL 109* 95 79      IMAGING STUDIES:  === 08/05/24 ===  XR CHEST 1 VIEW  No acute cardiopulmonary process.    === 08/05/24 ===  CT CERVICAL SPINE WO IV CONTRAST    CT HEAD:  1. No acute intracranial abnormality or calvarial fracture.    CT CERVICAL SPINE:  1. No acute fracture or traumatic malalignment of the cervical spine.      8/9/2024  FL Upper GI Single Contrast with Small Bowel Follow Through   No free intraperitoneal air has developed   Water-soluble contrast leaked out of the two duodenal bulb ulcers on   opposing sides of the bulb but no water-soluble contrast leaked   freely away from either of those two collections    Third,  more distal duodenal ulcer also feeding into the larger of the   two collections (based on prior CT but not definitively on today's   fluoroscopic exam) is in a location difficult to fluoroscopically   image was not well depicted on this fluoroscopic exam       === 08/05/24 ===  US RIGHT UPPER QUADRANT  The gallbladder is contracted, which limits evaluation. No  sonographic evidence of cholelithiasis. Nonspecific heterogeneous  appearance of the liver. Correlate with LFTs.    LAST EKG  Encounter Date: 08/05/24   ECG 12 lead   Result Value    Ventricular Rate 107    Atrial Rate 107    WI Interval 126    QRS Duration 78    QT Interval 346    QTC Calculation(Bazett) 461    P Axis 66    R Axis 83    T Axis 60    QRS Count 18    Q Onset 222    P Onset 159    P Offset 202    T Offset 395    QTC Fredericia 419       PROBLEMS ON ADMISSION:  GI bleeding [K92.2]    HOSPITAL PROBLEM LIST     Duodenal ulcer    Perforated ulcer of intestine (Multi)    Acute blood loss anemia    Acute gastritis with hemorrhage     ASSESSMENT AND PLAN FOR 8/10/2024  Patient had contained duodenal perforation which is managed conservatively.  She was given only clear liquid diets for now.  Reimaging will be done on Monday to see if we can progress her diet to semisolid to solid.  Meanwhile patient is not happy with this arrangement.  She threatens to leave AMA.  [facility] and seems like not very compliant with medical instructions.  She is getting IV metronidazole, ceftriaxone and fluconazole.  General surgery is on board who is trying to avoid laparotomy.  Patient was reassured and counseled.  Will continue with current medical therapy and plan.

## 2024-08-11 VITALS
HEIGHT: 63 IN | TEMPERATURE: 98.2 F | DIASTOLIC BLOOD PRESSURE: 83 MMHG | SYSTOLIC BLOOD PRESSURE: 115 MMHG | BODY MASS INDEX: 22.54 KG/M2 | WEIGHT: 127.21 LBS | HEART RATE: 104 BPM | RESPIRATION RATE: 18 BRPM | OXYGEN SATURATION: 98 %

## 2024-08-11 LAB
ALBUMIN SERPL BCP-MCNC: 3.3 G/DL (ref 3.4–5)
ALP SERPL-CCNC: 38 U/L (ref 33–110)
ALT SERPL W P-5'-P-CCNC: 11 U/L (ref 7–45)
ANION GAP SERPL CALC-SCNC: 7 MMOL/L (ref 10–20)
AST SERPL W P-5'-P-CCNC: 8 U/L (ref 9–39)
ATRIAL RATE: 107 BPM
BACTERIA BLD CULT: NORMAL
BACTERIA BLD CULT: NORMAL
BASOPHILS # BLD AUTO: 0.01 X10*3/UL (ref 0–0.1)
BASOPHILS NFR BLD AUTO: 0.1 %
BILIRUB SERPL-MCNC: 0.3 MG/DL (ref 0–1.2)
BUN SERPL-MCNC: 3 MG/DL (ref 6–23)
CALCIUM SERPL-MCNC: 8.3 MG/DL (ref 8.6–10.3)
CHLORIDE SERPL-SCNC: 111 MMOL/L (ref 98–107)
CO2 SERPL-SCNC: 26 MMOL/L (ref 21–32)
CREAT SERPL-MCNC: 0.6 MG/DL (ref 0.5–1.05)
EGFRCR SERPLBLD CKD-EPI 2021: >90 ML/MIN/1.73M*2
EOSINOPHIL # BLD AUTO: 0.33 X10*3/UL (ref 0–0.7)
EOSINOPHIL NFR BLD AUTO: 4.7 %
ERYTHROCYTE [DISTWIDTH] IN BLOOD BY AUTOMATED COUNT: 17.8 % (ref 11.5–14.5)
GLUCOSE SERPL-MCNC: 98 MG/DL (ref 74–99)
HCT VFR BLD AUTO: 30.6 % (ref 36–46)
HGB BLD-MCNC: 10 G/DL (ref 12–16)
IMM GRANULOCYTES # BLD AUTO: 0.01 X10*3/UL (ref 0–0.7)
IMM GRANULOCYTES NFR BLD AUTO: 0.1 % (ref 0–0.9)
LYMPHOCYTES # BLD AUTO: 2.17 X10*3/UL (ref 1.2–4.8)
LYMPHOCYTES NFR BLD AUTO: 30.8 %
MAGNESIUM SERPL-MCNC: 2.06 MG/DL (ref 1.6–2.4)
MCH RBC QN AUTO: 28.7 PG (ref 26–34)
MCHC RBC AUTO-ENTMCNC: 32.7 G/DL (ref 32–36)
MCV RBC AUTO: 88 FL (ref 80–100)
MONOCYTES # BLD AUTO: 0.52 X10*3/UL (ref 0.1–1)
MONOCYTES NFR BLD AUTO: 7.4 %
NEUTROPHILS # BLD AUTO: 4 X10*3/UL (ref 1.2–7.7)
NEUTROPHILS NFR BLD AUTO: 56.9 %
NRBC BLD-RTO: 0 /100 WBCS (ref 0–0)
P AXIS: 66 DEGREES
P OFFSET: 202 MS
P ONSET: 159 MS
PLATELET # BLD AUTO: 236 X10*3/UL (ref 150–450)
POTASSIUM SERPL-SCNC: 4.8 MMOL/L (ref 3.5–5.3)
PR INTERVAL: 126 MS
PROT SERPL-MCNC: 5.1 G/DL (ref 6.4–8.2)
Q ONSET: 222 MS
QRS COUNT: 18 BEATS
QRS DURATION: 78 MS
QT INTERVAL: 346 MS
QTC CALCULATION(BAZETT): 461 MS
QTC FREDERICIA: 419 MS
R AXIS: 83 DEGREES
RBC # BLD AUTO: 3.49 X10*6/UL (ref 4–5.2)
SODIUM SERPL-SCNC: 139 MMOL/L (ref 136–145)
T AXIS: 60 DEGREES
T OFFSET: 395 MS
VENTRICULAR RATE: 107 BPM
WBC # BLD AUTO: 7 X10*3/UL (ref 4.4–11.3)

## 2024-08-11 PROCEDURE — 83735 ASSAY OF MAGNESIUM: CPT | Performed by: INTERNAL MEDICINE

## 2024-08-11 PROCEDURE — 85025 COMPLETE CBC W/AUTO DIFF WBC: CPT | Performed by: INTERNAL MEDICINE

## 2024-08-11 PROCEDURE — 99231 SBSQ HOSP IP/OBS SF/LOW 25: CPT | Performed by: SURGERY

## 2024-08-11 PROCEDURE — 2500000004 HC RX 250 GENERAL PHARMACY W/ HCPCS (ALT 636 FOR OP/ED): Performed by: INTERNAL MEDICINE

## 2024-08-11 PROCEDURE — 3E0436Z INTRODUCTION OF NUTRITIONAL SUBSTANCE INTO CENTRAL VEIN, PERCUTANEOUS APPROACH: ICD-10-PCS | Performed by: HOSPITALIST

## 2024-08-11 PROCEDURE — 2500000005 HC RX 250 GENERAL PHARMACY W/O HCPCS: Performed by: REGISTERED NURSE

## 2024-08-11 PROCEDURE — 80053 COMPREHEN METABOLIC PANEL: CPT | Performed by: INTERNAL MEDICINE

## 2024-08-11 PROCEDURE — C9113 INJ PANTOPRAZOLE SODIUM, VIA: HCPCS | Performed by: INTERNAL MEDICINE

## 2024-08-11 PROCEDURE — 2500000004 HC RX 250 GENERAL PHARMACY W/ HCPCS (ALT 636 FOR OP/ED): Performed by: REGISTERED NURSE

## 2024-08-11 PROCEDURE — 2500000004 HC RX 250 GENERAL PHARMACY W/ HCPCS (ALT 636 FOR OP/ED): Performed by: SURGERY

## 2024-08-11 PROCEDURE — 1210000001 HC SEMI-PRIVATE ROOM DAILY

## 2024-08-11 PROCEDURE — 2500000001 HC RX 250 WO HCPCS SELF ADMINISTERED DRUGS (ALT 637 FOR MEDICARE OP): Performed by: SURGERY

## 2024-08-11 PROCEDURE — 99233 SBSQ HOSP IP/OBS HIGH 50: CPT | Performed by: INTERNAL MEDICINE

## 2024-08-11 PROCEDURE — 2500000005 HC RX 250 GENERAL PHARMACY W/O HCPCS

## 2024-08-11 PROCEDURE — 2500000004 HC RX 250 GENERAL PHARMACY W/ HCPCS (ALT 636 FOR OP/ED): Performed by: STUDENT IN AN ORGANIZED HEALTH CARE EDUCATION/TRAINING PROGRAM

## 2024-08-11 RX ORDER — NALOXONE HYDROCHLORIDE 1 MG/ML
0.2 INJECTION INTRAMUSCULAR; INTRAVENOUS; SUBCUTANEOUS AS NEEDED
Status: ACTIVE | OUTPATIENT
Start: 2024-08-11

## 2024-08-11 RX ORDER — HYDROMORPHONE HYDROCHLORIDE 1 MG/ML
1 INJECTION, SOLUTION INTRAMUSCULAR; INTRAVENOUS; SUBCUTANEOUS EVERY 2 HOUR PRN
Status: DISCONTINUED | OUTPATIENT
Start: 2024-08-11 | End: 2024-08-11

## 2024-08-11 RX ORDER — HYDROMORPHONE HCL/0.9% NACL/PF 15 MG/30ML
PATIENT CONTROLLED ANALGESIA SYRINGE INTRAVENOUS CONTINUOUS
Status: DISPENSED | OUTPATIENT
Start: 2024-08-11

## 2024-08-11 RX ORDER — TRAZODONE HYDROCHLORIDE 50 MG/1
50 TABLET ORAL NIGHTLY PRN
Status: ACTIVE | OUTPATIENT
Start: 2024-08-11

## 2024-08-11 ASSESSMENT — COGNITIVE AND FUNCTIONAL STATUS - GENERAL
WALKING IN HOSPITAL ROOM: A LITTLE
MOBILITY SCORE: 22
WALKING IN HOSPITAL ROOM: A LITTLE
MOBILITY SCORE: 22
CLIMB 3 TO 5 STEPS WITH RAILING: A LITTLE
DAILY ACTIVITIY SCORE: 24
DAILY ACTIVITIY SCORE: 24
CLIMB 3 TO 5 STEPS WITH RAILING: A LITTLE

## 2024-08-11 ASSESSMENT — PAIN DESCRIPTION - LOCATION
LOCATION: ABDOMEN

## 2024-08-11 ASSESSMENT — PAIN SCALES - GENERAL
PAINLEVEL_OUTOF10: 10 - WORST POSSIBLE PAIN
PAINLEVEL_OUTOF10: 10 - WORST POSSIBLE PAIN
PAINLEVEL_OUTOF10: 7
PAINLEVEL_OUTOF10: 10 - WORST POSSIBLE PAIN
PAINLEVEL_OUTOF10: 8
PAINLEVEL_OUTOF10: 10 - WORST POSSIBLE PAIN
PAINLEVEL_OUTOF10: 10 - WORST POSSIBLE PAIN

## 2024-08-11 ASSESSMENT — PAIN - FUNCTIONAL ASSESSMENT
PAIN_FUNCTIONAL_ASSESSMENT: 0-10
PAIN_FUNCTIONAL_ASSESSMENT: 0-10

## 2024-08-11 ASSESSMENT — PAIN SCALES - PAIN ASSESSMENT IN ADVANCED DEMENTIA (PAINAD): TOTALSCORE: MEDICATION (SEE MAR)

## 2024-08-11 NOTE — NURSING NOTE
Found a vape in the bed of the patient. Called security to hold it until discharge. Will contact doctor about a nicotine patch.

## 2024-08-11 NOTE — CARE PLAN
The patient's goals for the shift include Labs WNL    The clinical goals for the shift include pain control    Over the shift, the patient did not make progress toward the following goals. Barriers to progression include dilaudid not effective Recommendations to address these barriers include requested a review of pain meds from the doctor.

## 2024-08-11 NOTE — PROGRESS NOTES
ADMISSION DATE: 8/5/2024  HOSPITAL DAY: 5    SUBJECTIVE:  Patient was seen at bedside.  She remains very upset.  She is crying in pain.  General surgery has increased her Dilaudid every 2 hours still she is tearful and anxious which is not proportionate to her clinical problem.  I I feel that she would need a psychiatric evaluation.        OBJECTIVE:  Vitals:    08/10/24 2042 08/11/24 0443 08/11/24 0847 08/11/24 1228   BP: 107/56 106/65 107/73 110/64   BP Location: Left arm Left arm     Patient Position: Lying Lying     Pulse: 93 74 79 106   Resp: 18 18  16   Temp: 36.9 °C (98.4 °F) 36.4 °C (97.5 °F) 36.4 °C (97.5 °F) 36.6 °C (97.9 °F)   TempSrc: Temporal Temporal     SpO2: 97% 100% 98% 100%   Weight:       Height:            Intake/Output Summary (Last 24 hours) at 8/11/2024 1907  Last data filed at 8/11/2024 1731  Gross per 24 hour   Intake 2949 ml   Output 600 ml   Net 2349 ml      PHYSICAL EXAM:  Gen: Alert, awake, Oriented X 3. Not in any acute distress   HEENT:  Atraumatic, PERRL.  Conjunctivae clear.   Moist nasal mucous membranes. oropharynx non erythematous,   Neck:  Supple without thyromegaly or lymphadenopathy.  Lungs:  Clear to auscultation without rales, rhonchi, or rub.  Heart:  RRR, S1, S2, without M.  Abdomen:  Soft, non tender, no organ enlargement, bruit. Bowel sounds present . No CVA tenderness.  Extremities:  No edema. No calf swelling or tenderness.    Skin:  No rash, ecchymosis or erythema.    CURRENT ACTIVE MEDS:  cefTRIAXone, 1 g, intravenous, q24h  fat emulsion-plant based, 150 mL, intravenous, Daily Lipids  fluconazole, 400 mg, intravenous, q24h  [START ON 8/12/2024] iohexol, 500 mL, oral, Once in imaging  metroNIDAZOLE, 500 mg, intravenous, q8h  pantoprazole, 40 mg, intravenous, BID  sucralfate, 1 g, oral, q6h [facility]      LAB RESULTS:   CBC:   Results from last 7 days   Lab Units 08/11/24  0432 08/10/24  0529 08/09/24  1011   WBC AUTO x10*3/uL 7.0 7.6 7.3   RBC AUTO x10*6/uL 3.49* 3.33* 3.44*    HEMOGLOBIN g/dL 10.0* 9.2* 9.7*   HEMATOCRIT % 30.6* 29.0* 29.0*   MCV fL 88 87 84   MCH pg 28.7 27.6 28.2   MCHC g/dL 32.7 31.7* 33.4   RDW % 17.8* 18.1* 18.3*   PLATELETS AUTO x10*3/uL 236 275 268     CMP:    Results from last 7 days   Lab Units 08/11/24  0432 08/10/24  0529 08/08/24  0553 08/07/24  0711 08/05/24  2238   SODIUM mmol/L 139 139 138   < > 133*   POTASSIUM mmol/L 4.8 4.4 3.7   < > 3.5   CHLORIDE mmol/L 111* 111* 107   < > 100   CO2 mmol/L 26 25 24   < > 24   BUN mg/dL 3* 7 9   < > 30*   CREATININE mg/dL 0.60 0.69 0.70   < > 0.64   GLUCOSE mg/dL 98 109* 95   < > 93   PROTEIN TOTAL g/dL 5.1* 4.6*  --   --  5.6*   CALCIUM mg/dL 8.3* 7.8* 7.8*   < > 7.9*   BILIRUBIN TOTAL mg/dL 0.3 0.2  --   --  0.2   ALK PHOS U/L 38 37  --   --  45   AST U/L 8* 8*  --   --  29   ALT U/L 11 10  --   --  15    < > = values in this interval not displayed.     BMP:    Results from last 7 days   Lab Units 08/11/24  0432 08/10/24  0529 08/08/24  0553   SODIUM mmol/L 139 139 138   POTASSIUM mmol/L 4.8 4.4 3.7   CHLORIDE mmol/L 111* 111* 107   CO2 mmol/L 26 25 24   BUN mg/dL 3* 7 9   CREATININE mg/dL 0.60 0.69 0.70   CALCIUM mg/dL 8.3* 7.8* 7.8*   GLUCOSE mg/dL 98 109* 95      IMAGING STUDIES:  === 08/05/24 ===  XR CHEST 1 VIEW  No acute cardiopulmonary process.    === 08/05/24 ===  CT CERVICAL SPINE WO IV CONTRAST    CT HEAD:  1. No acute intracranial abnormality or calvarial fracture.    CT CERVICAL SPINE:  1. No acute fracture or traumatic malalignment of the cervical spine.      8/9/2024  FL Upper GI Single Contrast with Small Bowel Follow Through   No free intraperitoneal air has developed   Water-soluble contrast leaked out of the two duodenal bulb ulcers on   opposing sides of the bulb but no water-soluble contrast leaked   freely away from either of those two collections    Third, more distal duodenal ulcer also feeding into the larger of the   two collections (based on prior CT but not definitively on today's    fluoroscopic exam) is in a location difficult to fluoroscopically   image was not well depicted on this fluoroscopic exam       === 08/05/24 ===  US RIGHT UPPER QUADRANT  The gallbladder is contracted, which limits evaluation. No  sonographic evidence of cholelithiasis. Nonspecific heterogeneous  appearance of the liver. Correlate with LFTs.    LAST EKG  Encounter Date: 08/05/24   ECG 12 lead   Result Value    Ventricular Rate 107    Atrial Rate 107    LA Interval 126    QRS Duration 78    QT Interval 346    QTC Calculation(Bazett) 461    P Axis 66    R Axis 83    T Axis 60    QRS Count 18    Q Onset 222    P Onset 159    P Offset 202    T Offset 395    QTC Fredericia 419       PROBLEMS ON ADMISSION:  GI bleeding [K92.2]    HOSPITAL PROBLEM LIST     Duodenal ulcer    Perforated ulcer of intestine (Multi)    Acute blood loss anemia    Acute gastritis with hemorrhage     ASSESSMENT AND PLAN FOR 8/11/2024  Patient had contained duodenal perforation which is managed conservatively.  General surgery has started her on Dilaudid PCA.  She appears to be more anxious and tearful.  She also has Ativan.  At this point I like to bring psychiatrist on board.  She complained about insomnia as well I do not want to increase any more benzodiazepine.  We will try with trazodone.  Patient will have CT scan of abdomen and pelvis with contrast tomorrow.  She does not have leukocytosis.  Her kidney function is also within the normal limit.  Vital signs are stable.  Will continue with current medical therapy and plan.

## 2024-08-11 NOTE — PROGRESS NOTES
General Surgery Progress Note    Patient: Lisa Montemayor  Unit/Bed: 1107/1107-A  YOB: 1995  MRN: [id number]  Acct: [id number]   Admitting Diagnosis: GI bleeding [K92.2]  Date:  8/5/2024  Hospital Day: 5  Attending: Jeet Braga MD    Complaint:  Chief Complaint   Patient presents with    Black or Bloody Stool      Subjective  Patient seen and examined this morning. No acute events overnight. Patient denies nausea and vomiting. She states she is still having a lot of abdominal pain. Asking for full liquid diet reeducated on why she can not.     PHYSICAL EXAM:  Physical Exam  Vitals reviewed.   Constitutional:       General: She is not in acute distress.  HENT:      Head: Normocephalic.      Nose: Nose normal.      Mouth/Throat:      Mouth: Mucous membranes are moist.   Cardiovascular:      Rate and Rhythm: Normal rate.   Pulmonary:      Effort: Pulmonary effort is normal.   Abdominal:      General: Abdomen is flat. Bowel sounds are normal.      Palpations: Abdomen is soft.      Tenderness: There is abdominal tenderness in the right upper quadrant, right lower quadrant and epigastric area.   Musculoskeletal:         General: Normal range of motion.      Cervical back: Normal range of motion.   Skin:     General: Skin is warm.      Capillary Refill: Capillary refill takes less than 2 seconds.   Neurological:      General: No focal deficit present.      Mental Status: She is alert and oriented to person, place, and time.   Psychiatric:         Mood and Affect: Mood normal.       Vital signs in last 24 hours:  Vitals:    08/11/24 0443   BP: 106/65   Pulse: 74   Resp: 18   Temp: 36.4 °C (97.5 °F)   SpO2: 100%     Intake/Output this shift:    Intake/Output Summary (Last 24 hours) at 8/11/2024 0729  Last data filed at 8/11/2024 0513  Gross per 24 hour   Intake 1426 ml   Output 250 ml   Net 1176 ml      Allergies:  Allergies   Allergen Reactions    Amoxicillin Hives    Penicillins Hives       Medications:  Scheduled medications  cefTRIAXone, 1 g, intravenous, q24h  fat emulsion-plant based, 150 mL, intravenous, Daily Lipids  fluconazole, 400 mg, intravenous, q24h  metroNIDAZOLE, 500 mg, intravenous, q8h  pantoprazole, 40 mg, intravenous, BID  sucralfate, 1 g, oral, q6h [facility]      Continuous medications  Adult Clinimix Parenteral Nutrition, 50 mL/hr, Last Rate: 50 mL/hr (08/10/24 2346)      PRN medications  PRN medications: alteplase, diphenhydrAMINE, HYDROmorphone, LORazepam, ondansetron **OR** ondansetron  Labs:  Results for orders placed or performed during the hospital encounter of 08/05/24 (from the past 24 hour(s))   CBC and Auto Differential   Result Value Ref Range    WBC 7.0 4.4 - 11.3 x10*3/uL    nRBC 0.0 0.0 - 0.0 /100 WBCs    RBC 3.49 (L) 4.00 - 5.20 x10*6/uL    Hemoglobin 10.0 (L) 12.0 - 16.0 g/dL    Hematocrit 30.6 (L) 36.0 - 46.0 %    MCV 88 80 - 100 fL    MCH 28.7 26.0 - 34.0 pg    MCHC 32.7 32.0 - 36.0 g/dL    RDW 17.8 (H) 11.5 - 14.5 %    Platelets 236 150 - 450 x10*3/uL    Neutrophils % 56.9 40.0 - 80.0 %    Immature Granulocytes %, Automated 0.1 0.0 - 0.9 %    Lymphocytes % 30.8 13.0 - 44.0 %    Monocytes % 7.4 2.0 - 10.0 %    Eosinophils % 4.7 0.0 - 6.0 %    Basophils % 0.1 0.0 - 2.0 %    Neutrophils Absolute 4.00 1.20 - 7.70 x10*3/uL    Immature Granulocytes Absolute, Automated 0.01 0.00 - 0.70 x10*3/uL    Lymphocytes Absolute 2.17 1.20 - 4.80 x10*3/uL    Monocytes Absolute 0.52 0.10 - 1.00 x10*3/uL    Eosinophils Absolute 0.33 0.00 - 0.70 x10*3/uL    Basophils Absolute 0.01 0.00 - 0.10 x10*3/uL   Comprehensive Metabolic Panel   Result Value Ref Range    Glucose 98 74 - 99 mg/dL    Sodium 139 136 - 145 mmol/L    Potassium 4.8 3.5 - 5.3 mmol/L    Chloride 111 (H) 98 - 107 mmol/L    Bicarbonate 26 21 - 32 mmol/L    Anion Gap 7 (L) 10 - 20 mmol/L    Urea Nitrogen 3 (L) 6 - 23 mg/dL    Creatinine 0.60 0.50 - 1.05 mg/dL    eGFR >90 >60 mL/min/1.73m*2    Calcium 8.3 (L) 8.6 - 10.3 mg/dL     Albumin 3.3 (L) 3.4 - 5.0 g/dL    Alkaline Phosphatase 38 33 - 110 U/L    Total Protein 5.1 (L) 6.4 - 8.2 g/dL    AST 8 (L) 9 - 39 U/L    Bilirubin, Total 0.3 0.0 - 1.2 mg/dL    ALT 11 7 - 45 U/L   Magnesium   Result Value Ref Range    Magnesium 2.06 1.60 - 2.40 mg/dL      Imaging:  Bedside PICC Imaging    Result Date: 8/9/2024  These images are not reportable by radiology and will not be interpreted by  Radiologists.    ECG 12 lead    Result Date: 8/9/2024  Sinus tachycardia Otherwise normal ECG No previous ECGs available See ED provider note for full interpretation and clinical correlation    FL upper GI single contrast w small bowel follow through    Result Date: 8/9/2024  Interpreted By:  Ozzie Duran, STUDY: FL UPPER GI SINGLE CONTRAST W SMALL BOWEL FOLLOW THROUGH;  8/9/2024 11:58 am   INDICATION: Signs/Symptoms:see if contained leak has sealed.   COMPARISON: CTA abdomen and pelvis 5 August 2024   ACCESSION NUMBER(S): [id number]   ORDERING CLINICIAN: COMPA ESP[place]A   TECHNIQUE: Monophasic water-soluble upper GI series   Fluoroscopy time: 3 Minutes, 30 seconds   FINDINGS: Before beginning the exam I obtained multiple  radiographs to confirm the patient has not developed free intraperitoneal air since recent CT. No free intraperitoneal air   Water-soluble contrast passed from the stomach into the proximal duodenum and leaked out of two separate more proximal duodenal bulb ulcers on opposing (medial and superolateral) sides of the bulb. These two ulcers and their respective collections are well demonstrated on axial series 601, image 158 of recent CT abdomen and pelvis which shows gas and fluid in contained collections arising off opposing sides of the duodenal bulb. The larger collection is the one insinuating in the duodenal C-loop   The amount of water-soluble contrast gradually increased in both of the collections, but there was no free flow of water-soluble contrast away from the duodenum   Review of  the recent CTA shows yet a third ulcer slightly more distal in the duodenum, the one I annotated on axial series 601, image 167 with a long arrow (not same series image 157 with the short arrow). No definite leak of contrast through this ulcer but review of prior CT confirms it does feed into the same larger of the two collections (the one insinuating in the duodenal C-loop)       No free intraperitoneal air has developed   Water-soluble contrast leaked out of the two duodenal bulb ulcers on opposing sides of the bulb but no water-soluble contrast leaked freely away from either of those two collections   Third, more distal duodenal ulcer also feeding into the larger of the two collections (based on prior CT but not definitively on today's fluoroscopic exam) is in a location difficult to fluoroscopically image was not well depicted on this fluoroscopic exam   MACRO: None   Signed by: Ozzie Duran 8/9/2024 12:11 PM Dictation workstation:   [id number]      Assessment  UGI Bleed  Multiple duodenal ulcers    On exam abdomen is soft, non distended, and tenderness with palpation to the epigastric, RUQ and RLQ. Bowel sound present x 4 quadrants. Labs this morning show no leukocytosis, Hgb stable. Afebrile.     Plan  -Continue clear liquids until reimaging next week  -Order CT abd/pel with IV and PO contrast for tomorrow  -Continue TPN  -Continue IVF  -Continue IV ABX  -Pain control  -Nausea control  -DVT prophylaxis-SCDs. Recommend starting sub q heparin as Hgb is stable  -Pulmonary toileting   -Encourage ambulation  -Continue care per primary team       Further recommendations per Dr. Luz Krishnan, APRN-CNP    Time spent  37  minutes obtaining labs, imaging, recommendations, interview, assessment, examination, medication review/ordering, and EMR review.    Plan of care was discussed extensively with patient. Patient verbalized understanding through teach back method. All questions and concerns addressed upon  examination.     Of note, this documentation is completed using the Dragon Dictation system (voice recognition software). There may be spelling and/or grammatical errors that were not corrected prior to final submission.

## 2024-08-12 ENCOUNTER — APPOINTMENT (OUTPATIENT)
Dept: RADIOLOGY | Facility: HOSPITAL | Age: 29
End: 2024-08-12
Payer: MEDICAID

## 2024-08-12 LAB
ALBUMIN SERPL BCP-MCNC: 3.4 G/DL (ref 3.4–5)
ALP SERPL-CCNC: 39 U/L (ref 33–110)
ALT SERPL W P-5'-P-CCNC: 9 U/L (ref 7–45)
ANION GAP SERPL CALC-SCNC: 8 MMOL/L (ref 10–20)
AST SERPL W P-5'-P-CCNC: 8 U/L (ref 9–39)
BASOPHILS # BLD AUTO: 0.02 X10*3/UL (ref 0–0.1)
BASOPHILS NFR BLD AUTO: 0.3 %
BILIRUB SERPL-MCNC: 0.3 MG/DL (ref 0–1.2)
BUN SERPL-MCNC: 5 MG/DL (ref 6–23)
CALCIUM SERPL-MCNC: 8.4 MG/DL (ref 8.6–10.3)
CHLORIDE SERPL-SCNC: 106 MMOL/L (ref 98–107)
CO2 SERPL-SCNC: 27 MMOL/L (ref 21–32)
CREAT SERPL-MCNC: 0.63 MG/DL (ref 0.5–1.05)
EGFRCR SERPLBLD CKD-EPI 2021: >90 ML/MIN/1.73M*2
EOSINOPHIL # BLD AUTO: 0.51 X10*3/UL (ref 0–0.7)
EOSINOPHIL NFR BLD AUTO: 6.7 %
ERYTHROCYTE [DISTWIDTH] IN BLOOD BY AUTOMATED COUNT: 17.6 % (ref 11.5–14.5)
GASTRIN SERPL-MCNC: 23 PG/ML (ref 0–100)
GLUCOSE SERPL-MCNC: 113 MG/DL (ref 74–99)
HCT VFR BLD AUTO: 31.6 % (ref 36–46)
HGB BLD-MCNC: 10 G/DL (ref 12–16)
IMM GRANULOCYTES # BLD AUTO: 0.03 X10*3/UL (ref 0–0.7)
IMM GRANULOCYTES NFR BLD AUTO: 0.4 % (ref 0–0.9)
LYMPHOCYTES # BLD AUTO: 2.17 X10*3/UL (ref 1.2–4.8)
LYMPHOCYTES NFR BLD AUTO: 28.7 %
MAGNESIUM SERPL-MCNC: 1.88 MG/DL (ref 1.6–2.4)
MCH RBC QN AUTO: 28.4 PG (ref 26–34)
MCHC RBC AUTO-ENTMCNC: 31.6 G/DL (ref 32–36)
MCV RBC AUTO: 90 FL (ref 80–100)
MONOCYTES # BLD AUTO: 0.65 X10*3/UL (ref 0.1–1)
MONOCYTES NFR BLD AUTO: 8.6 %
NEUTROPHILS # BLD AUTO: 4.19 X10*3/UL (ref 1.2–7.7)
NEUTROPHILS NFR BLD AUTO: 55.3 %
NRBC BLD-RTO: 0 /100 WBCS (ref 0–0)
PLATELET # BLD AUTO: 228 X10*3/UL (ref 150–450)
POTASSIUM SERPL-SCNC: 3.7 MMOL/L (ref 3.5–5.3)
PROT SERPL-MCNC: 5.3 G/DL (ref 6.4–8.2)
RBC # BLD AUTO: 3.52 X10*6/UL (ref 4–5.2)
SODIUM SERPL-SCNC: 137 MMOL/L (ref 136–145)
WBC # BLD AUTO: 7.6 X10*3/UL (ref 4.4–11.3)

## 2024-08-12 PROCEDURE — 2500000002 HC RX 250 W HCPCS SELF ADMINISTERED DRUGS (ALT 637 FOR MEDICARE OP, ALT 636 FOR OP/ED): Performed by: REGISTERED NURSE

## 2024-08-12 PROCEDURE — A9698 NON-RAD CONTRAST MATERIALNOC: HCPCS | Performed by: REGISTERED NURSE

## 2024-08-12 PROCEDURE — 2500000004 HC RX 250 GENERAL PHARMACY W/ HCPCS (ALT 636 FOR OP/ED): Performed by: STUDENT IN AN ORGANIZED HEALTH CARE EDUCATION/TRAINING PROGRAM

## 2024-08-12 PROCEDURE — 99222 1ST HOSP IP/OBS MODERATE 55: CPT | Performed by: REGISTERED NURSE

## 2024-08-12 PROCEDURE — 74177 CT ABD & PELVIS W/CONTRAST: CPT

## 2024-08-12 PROCEDURE — 99231 SBSQ HOSP IP/OBS SF/LOW 25: CPT | Performed by: SURGERY

## 2024-08-12 PROCEDURE — 1210000001 HC SEMI-PRIVATE ROOM DAILY

## 2024-08-12 PROCEDURE — 2500000005 HC RX 250 GENERAL PHARMACY W/O HCPCS

## 2024-08-12 PROCEDURE — 83735 ASSAY OF MAGNESIUM: CPT | Performed by: SURGERY

## 2024-08-12 PROCEDURE — 80053 COMPREHEN METABOLIC PANEL: CPT | Performed by: SURGERY

## 2024-08-12 PROCEDURE — 85025 COMPLETE CBC W/AUTO DIFF WBC: CPT | Performed by: SURGERY

## 2024-08-12 PROCEDURE — 2500000004 HC RX 250 GENERAL PHARMACY W/ HCPCS (ALT 636 FOR OP/ED): Mod: JZ | Performed by: INTERNAL MEDICINE

## 2024-08-12 PROCEDURE — 2500000004 HC RX 250 GENERAL PHARMACY W/ HCPCS (ALT 636 FOR OP/ED): Performed by: SURGERY

## 2024-08-12 PROCEDURE — 2550000001 HC RX 255 CONTRASTS: Performed by: REGISTERED NURSE

## 2024-08-12 PROCEDURE — C9113 INJ PANTOPRAZOLE SODIUM, VIA: HCPCS | Performed by: INTERNAL MEDICINE

## 2024-08-12 PROCEDURE — 2500000001 HC RX 250 WO HCPCS SELF ADMINISTERED DRUGS (ALT 637 FOR MEDICARE OP): Performed by: STUDENT IN AN ORGANIZED HEALTH CARE EDUCATION/TRAINING PROGRAM

## 2024-08-12 PROCEDURE — 93971 EXTREMITY STUDY: CPT | Performed by: RADIOLOGY

## 2024-08-12 PROCEDURE — 99233 SBSQ HOSP IP/OBS HIGH 50: CPT | Performed by: STUDENT IN AN ORGANIZED HEALTH CARE EDUCATION/TRAINING PROGRAM

## 2024-08-12 PROCEDURE — 74177 CT ABD & PELVIS W/CONTRAST: CPT | Performed by: STUDENT IN AN ORGANIZED HEALTH CARE EDUCATION/TRAINING PROGRAM

## 2024-08-12 PROCEDURE — 93971 EXTREMITY STUDY: CPT

## 2024-08-12 RX ORDER — OLANZAPINE 5 MG/1
5 TABLET ORAL NIGHTLY
Status: DISCONTINUED | OUTPATIENT
Start: 2024-08-12 | End: 2024-08-13

## 2024-08-12 ASSESSMENT — COGNITIVE AND FUNCTIONAL STATUS - GENERAL
CLIMB 3 TO 5 STEPS WITH RAILING: A LITTLE
DAILY ACTIVITIY SCORE: 24
MOBILITY SCORE: 22
WALKING IN HOSPITAL ROOM: A LITTLE
MOBILITY SCORE: 22
WALKING IN HOSPITAL ROOM: A LITTLE
CLIMB 3 TO 5 STEPS WITH RAILING: A LITTLE

## 2024-08-12 ASSESSMENT — PAIN - FUNCTIONAL ASSESSMENT
PAIN_FUNCTIONAL_ASSESSMENT: 0-10

## 2024-08-12 ASSESSMENT — PAIN SCALES - PAIN ASSESSMENT IN ADVANCED DEMENTIA (PAINAD): TOTALSCORE: MEDICATION (SEE MAR)

## 2024-08-12 ASSESSMENT — PAIN SCALES - GENERAL
PAINLEVEL_OUTOF10: 8
PAINLEVEL_OUTOF10: 8
PAINLEVEL_OUTOF10: 7

## 2024-08-12 NOTE — NURSING NOTE
Nurse called us stating pt was complaining of pain in the picc arm.  We went to assess pt and meds infusing without difficulty, no redness but pain with palpation to right upper, inner arm.  I assessed for blood return and blood return was easily obtained and then I flushed PICC with 2, 10 ml normal saline syringes without pt c/o of any pain and flushed easily.  I measured both upper arms and both measured at 26cms.  I didn't remove dressing, could see PICC was still in same position line placed located at 1cm out of skin. I spoke with Isha IVAN RN who placed PICC and she confirmed Arm circumference was 26cm. Annette, bedside RN, updated and pt may need an ultrasound of right upper extremity.

## 2024-08-12 NOTE — PROGRESS NOTES
General Surgery Progress Note    Patient: Lisa Montemayor  Unit/Bed: 1107/1107-A  YOB: 1995  MRN: [id number]  Acct: [id number]   Admitting Diagnosis: GI bleeding [K92.2]  Date:  8/5/2024  Hospital Day: 6  Attending: Jose Perez MD    Complaint:  Chief Complaint   Patient presents with    Black or Bloody Stool      Subjective  Patient seen and examined this morning. No acute events overnight. Sleepy, aroused. Patient denies nausea and vomiting. She states she is still having abdominal pain to right side. Asking if diet can be advanced after imagining.     PHYSICAL EXAM:  Physical Exam  Vitals reviewed.   Constitutional:       General: She is sleeping. She is not in acute distress.  HENT:      Head: Normocephalic.      Nose: Nose normal.      Mouth/Throat:      Mouth: Mucous membranes are moist.   Cardiovascular:      Rate and Rhythm: Normal rate.   Pulmonary:      Effort: Pulmonary effort is normal.   Abdominal:      General: Abdomen is flat. Bowel sounds are normal.      Palpations: Abdomen is soft.      Tenderness: There is abdominal tenderness in the right upper quadrant, right lower quadrant and epigastric area.   Musculoskeletal:         General: Normal range of motion.      Cervical back: Normal range of motion.   Skin:     General: Skin is warm.      Capillary Refill: Capillary refill takes less than 2 seconds.   Neurological:      General: No focal deficit present.      Mental Status: She is oriented to person, place, and time and easily aroused.   Psychiatric:         Mood and Affect: Mood normal.       Vital signs in last 24 hours:  Vitals:    08/12/24 0300   BP: 99/64   Pulse: 102   Resp: 14   Temp: 36.8 °C (98.2 °F)   SpO2: 100%     Intake/Output this shift:    Intake/Output Summary (Last 24 hours) at 8/12/2024 0756  Last data filed at 8/12/2024 0749  Gross per 24 hour   Intake 1889.4 ml   Output 600 ml   Net 1289.4 ml      Allergies:  Allergies   Allergen Reactions    Amoxicillin  Hives    Penicillins Hives      Medications:  Scheduled medications  cefTRIAXone, 1 g, intravenous, q24h  fat emulsion-plant based, 150 mL, intravenous, Daily Lipids  fluconazole, 400 mg, intravenous, q24h  iohexol, 500 mL, oral, Once in imaging  metroNIDAZOLE, 500 mg, intravenous, q8h  pantoprazole, 40 mg, intravenous, BID  sucralfate, 1 g, oral, q6h LÓPEZ      Continuous medications  Adult Clinimix Parenteral Nutrition, 50 mL/hr, Last Rate: 50 mL/hr (08/11/24 2337)  HYDROmorphone,       PRN medications  PRN medications: alteplase, diphenhydrAMINE, LORazepam, naloxone, ondansetron **OR** ondansetron, traZODone  Labs:  Results for orders placed or performed during the hospital encounter of 08/05/24 (from the past 24 hour(s))   CBC and Auto Differential   Result Value Ref Range    WBC 7.6 4.4 - 11.3 x10*3/uL    nRBC 0.0 0.0 - 0.0 /100 WBCs    RBC 3.52 (L) 4.00 - 5.20 x10*6/uL    Hemoglobin 10.0 (L) 12.0 - 16.0 g/dL    Hematocrit 31.6 (L) 36.0 - 46.0 %    MCV 90 80 - 100 fL    MCH 28.4 26.0 - 34.0 pg    MCHC 31.6 (L) 32.0 - 36.0 g/dL    RDW 17.6 (H) 11.5 - 14.5 %    Platelets 228 150 - 450 x10*3/uL    Neutrophils % 55.3 40.0 - 80.0 %    Immature Granulocytes %, Automated 0.4 0.0 - 0.9 %    Lymphocytes % 28.7 13.0 - 44.0 %    Monocytes % 8.6 2.0 - 10.0 %    Eosinophils % 6.7 0.0 - 6.0 %    Basophils % 0.3 0.0 - 2.0 %    Neutrophils Absolute 4.19 1.20 - 7.70 x10*3/uL    Immature Granulocytes Absolute, Automated 0.03 0.00 - 0.70 x10*3/uL    Lymphocytes Absolute 2.17 1.20 - 4.80 x10*3/uL    Monocytes Absolute 0.65 0.10 - 1.00 x10*3/uL    Eosinophils Absolute 0.51 0.00 - 0.70 x10*3/uL    Basophils Absolute 0.02 0.00 - 0.10 x10*3/uL   Comprehensive Metabolic Panel   Result Value Ref Range    Glucose 113 (H) 74 - 99 mg/dL    Sodium 137 136 - 145 mmol/L    Potassium 3.7 3.5 - 5.3 mmol/L    Chloride 106 98 - 107 mmol/L    Bicarbonate 27 21 - 32 mmol/L    Anion Gap 8 (L) 10 - 20 mmol/L    Urea Nitrogen 5 (L) 6 - 23 mg/dL     Creatinine 0.63 0.50 - 1.05 mg/dL    eGFR >90 >60 mL/min/1.73m*2    Calcium 8.4 (L) 8.6 - 10.3 mg/dL    Albumin 3.4 3.4 - 5.0 g/dL    Alkaline Phosphatase 39 33 - 110 U/L    Total Protein 5.3 (L) 6.4 - 8.2 g/dL    AST 8 (L) 9 - 39 U/L    Bilirubin, Total 0.3 0.0 - 1.2 mg/dL    ALT 9 7 - 45 U/L   Magnesium   Result Value Ref Range    Magnesium 1.88 1.60 - 2.40 mg/dL      Imaging:  No results found.     Assessment  UGI Bleed  Multiple duodenal ulcers    On exam abdomen is soft, non distended, and tenderness with palpation to the epigastric, RUQ and RLQ. Bowel sound present x 4 quadrants. Labs this morning show no leukocytosis, Hgb stable. Afebrile.     Plan  -Continue clear liquids for now  -CT abd/pel with IV and PO contrast awaiting results  -Continue TPN  -Continue IVF  -Continue IV ABX  -Pain control-PCA pump being managed by Dr. Escobar  -Nausea control  -DVT prophylaxis-SCDs. Recommend starting sub q heparin as Hgb is stable  -Pulmonary toileting   -Encourage ambulation  -Continue care per primary team       Further recommendations per Dr. Luz Krishnan, APRN-CNP    Time spent  37  minutes obtaining labs, imaging, recommendations, interview, assessment, examination, medication review/ordering, and EMR review.    Plan of care was discussed extensively with patient. Patient verbalized understanding through teach back method. All questions and concerns addressed upon examination.     Of note, this documentation is completed using the Dragon Dictation system (voice recognition software). There may be spelling and/or grammatical errors that were not corrected prior to final submission.
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
## 2024-08-12 NOTE — PROGRESS NOTES
08/12/24 1153   Discharge Planning   Living Arrangements Family members;Friends   Support Systems Friends/neighbors   Assistance Needed PTA- independent   Type of Residence Private residence   Home or Post Acute Services Community services   Expected Discharge Disposition Other  (Return to residential/'s office)   Does the patient need discharge transport arranged? No     General surgery following. Started on PPN, can also have clear liquids. IV Rocephin, Flagyl, and Diflucan. HGB stable and no further bleeding noted. Reports a lot of abd pain still but requesting diet change to more full liquids. To have follow up CT abd tomorrow.

## 2024-08-12 NOTE — CARE PLAN
Problem: Skin  Goal: Prevent/manage excess moisture  8/12/2024 1811 by Annette Olivares RN  Outcome: Progressing  8/12/2024 1416 by Annette Olivares RN  Flowsheets (Taken 8/12/2024 0816)  Prevent/manage excess moisture: Moisturize dry skin  Goal: Prevent/minimize sheer/friction injuries  8/12/2024 1811 by Annette Olivares RN  Outcome: Progressing  8/12/2024 1416 by Annette Olivares RN  Flowsheets (Taken 8/12/2024 0816)  Prevent/minimize sheer/friction injuries: Increase activity/out of bed for meals     Problem: Pain - Adult  Goal: Verbalizes/displays adequate comfort level or baseline comfort level  8/12/2024 1811 by Annette Olivares RN  Outcome: Progressing  8/12/2024 1416 by Annette Olivares RN  Flowsheets (Taken 8/12/2024 0816)  Verbalizes/displays adequate comfort level or baseline comfort level:   Encourage patient to monitor pain and request assistance   Assess pain using appropriate pain scale   Administer analgesics based on type and severity of pain and evaluate response   Implement non-pharmacological measures as appropriate and evaluate response   Consider cultural and social influences on pain and pain management   Notify Licensed Independent Practitioner if interventions unsuccessful or patient reports new pain     Problem: Safety - Adult  Goal: Free from fall injury  8/12/2024 1811 by Annette Olivares RN  Outcome: Progressing  8/12/2024 1416 by Annette Olivares RN  Flowsheets (Taken 8/12/2024 0816)  Free from fall injury: Instruct family/caregiver on patient safety     Problem: Discharge Planning  Goal: Discharge to home or other facility with appropriate resources  8/12/2024 1811 by Annette Olivares RN  Outcome: Progressing  8/12/2024 1416 by Annette Olivares RN  Flowsheets (Taken 8/12/2024 0816)  Discharge to home or other facility with appropriate resources:   Identify barriers to discharge with patient and caregiver   Arrange for needed discharge resources and transportation as appropriate    Identify discharge learning needs (meds, wound care, etc)   Refer to discharge planning if patient needs post-hospital services based on physician order or complex needs related to functional status, cognitive ability or social support system     Problem: Fall/Injury  Goal: Not fall by end of shift  Outcome: Progressing  Goal: Be free from injury by end of the shift  Outcome: Progressing  Goal: Verbalize understanding of personal risk factors for fall in the hospital  Outcome: Progressing  Goal: Verbalize understanding of risk factor reduction measures to prevent injury from fall in the home  Outcome: Progressing  Goal: Use assistive devices by end of the shift  Outcome: Progressing  Goal: Pace activities to prevent fatigue by end of the shift  Outcome: Progressing     Problem: Pain  Goal: Takes deep breaths with improved pain control throughout the shift  Outcome: Progressing  Goal: Turns in bed with improved pain control throughout the shift  Outcome: Progressing  Goal: Walks with improved pain control throughout the shift  Outcome: Progressing  Goal: Performs ADL's with improved pain control throughout shift  Outcome: Progressing  Goal: Participates in PT with improved pain control throughout the shift  Outcome: Progressing  Goal: Free from opioid side effects throughout the shift  Outcome: Progressing  Goal: Free from acute confusion related to pain meds throughout the shift  Outcome: Progressing   The patient's goals for the shift include Labs WNL    The clinical goals for the shift include Patient's pain will be managed to a tolerable level throughout this shift.    Over the shift, the patient did make progress toward care plan goals.

## 2024-08-12 NOTE — CARE PLAN
The patient's goals for the shift include Labs WNL    The clinical goals for the shift include pain control      Problem: Skin  Goal: Prevent/manage excess moisture  Outcome: Progressing  Flowsheets (Taken 8/12/2024 0131)  Prevent/manage excess moisture: Moisturize dry skin  Goal: Prevent/minimize sheer/friction injuries  Outcome: Progressing  Flowsheets (Taken 8/12/2024 0131)  Prevent/minimize sheer/friction injuries: Increase activity/out of bed for meals     Problem: Pain - Adult  Goal: Verbalizes/displays adequate comfort level or baseline comfort level  Outcome: Progressing     Problem: Safety - Adult  Goal: Free from fall injury  Outcome: Progressing     Problem: Discharge Planning  Goal: Discharge to home or other facility with appropriate resources  Outcome: Progressing     Problem: Fall/Injury  Goal: Not fall by end of shift  Outcome: Progressing  Goal: Be free from injury by end of the shift  Outcome: Progressing  Goal: Verbalize understanding of personal risk factors for fall in the hospital  Outcome: Progressing  Goal: Verbalize understanding of risk factor reduction measures to prevent injury from fall in the home  Outcome: Progressing  Goal: Use assistive devices by end of the shift  Outcome: Progressing  Goal: Pace activities to prevent fatigue by end of the shift  Outcome: Progressing     Problem: Pain  Goal: Takes deep breaths with improved pain control throughout the shift  Outcome: Progressing  Goal: Turns in bed with improved pain control throughout the shift  Outcome: Progressing  Goal: Walks with improved pain control throughout the shift  Outcome: Progressing  Goal: Performs ADL's with improved pain control throughout shift  Outcome: Progressing  Goal: Participates in PT with improved pain control throughout the shift  Outcome: Progressing  Goal: Free from opioid side effects throughout the shift  Outcome: Progressing  Goal: Free from acute confusion related to pain meds throughout the  shift  Outcome: Progressing

## 2024-08-12 NOTE — PROGRESS NOTES
Medical Group Progress Note  ASSESSMENT & PLAN:     Multiple contained perforated duodenal ulcers  - CT abdomen/pelvis with IV contrast from 8/6 reviewed  - Repeat CT abdomen/pelvis with IV and oral contrast ordered for this morning  - General Surgery following  - Continuing Protonix 40 mg IV twice daily  - Remains on a Dilaudid PCA pump as well  - Tolerating PPN and clear liquids today  - GI signed off  - Continue intra-abdominal prophylaxis with fluconazole, ceftriaxone, metronidazole    Upper GI bleed  Acute blood loss anemia  - Patient denied regular aspirin/NSAID use  - Was having hematemesis and rectal bleeding for few days prior to admission, CT abdomen/pelvis with large clot seen as well  - Hemoglobin 10.0 today (6.3 on admission, status post 2 units PBC transfusion)  - Continue Protonix as per above    RUE pain  - Pain on exam today  - Specials assessed PICC line and no issues there  - Venous duplex RUE ordered    VTE prophylaxis: SCD    Disposition/Daily update: Hemoglobin stabilized, no episodes of further bleeding.  General surgery following, repeat CT abdomen/pelvis with IV and p.o. contrast ordered for today.  Continue clear liquid and PPN otherwise.  Will need to discuss with 's department once patient is ready for discharge on disposition.      ---Of note, this documentation is completed using the Dragon Dictation system (voice recognition software). There may be spelling and/or grammatical errors that were not corrected prior to final submission.---    Jose Perez MD    SUBJECTIVE     Patient was seen and examined bedside this morning.  Remains lethargic, still complaining of 10/10 right epigastric pain.  Patient states still feeling nauseous with clear liquids as well.    OBJECTIVE:     Last Recorded Vitals:  Vitals:    08/11/24 1228 08/11/24 1900 08/11/24 2300 08/12/24 0300   BP: 110/64 123/84 115/83 99/64   BP Location:  Right leg  Left arm   Patient Position:  Lying  Sitting    Pulse: 106 99 104 102   Resp: 16 18 18 14   Temp: 36.6 °C (97.9 °F) 36.5 °C (97.7 °F) 36.8 °C (98.2 °F) 36.8 °C (98.2 °F)   TempSrc:  Temporal Temporal Temporal   SpO2: 100% 99% 98% 100%   Weight:       Height:         Last I/O:  I/O last 3 completed shifts:  In: 2949 (51.1 mL/kg) [P.O.:800; IV Piggyback:1200]  Out: 600 (10.4 mL/kg) [Urine:600 (0.3 mL/kg/hr)]  Weight: 57.7 kg     Physical Exam  Vitals reviewed.   Constitutional:       General: She is not in acute distress.     Appearance: She is not toxic-appearing.      Comments: Alert and oriented x 3, lethargic on exam but does arouse to questions and answers appropriately.   HENT:      Head: Normocephalic and atraumatic.   Eyes:      Extraocular Movements: Extraocular movements intact.      Conjunctiva/sclera: Conjunctivae normal.   Cardiovascular:      Rate and Rhythm: Normal rate and regular rhythm.      Pulses: Normal pulses.      Heart sounds: Normal heart sounds.   Pulmonary:      Effort: Pulmonary effort is normal. No respiratory distress.      Breath sounds: Normal breath sounds. No wheezing.   Abdominal:      General: Bowel sounds are normal. There is no distension.      Palpations: Abdomen is soft.      Tenderness: There is abdominal tenderness. There is no guarding.      Comments: Right-sided abdominal tenderness to deep palpation.   Musculoskeletal:         General: Normal range of motion.      Cervical back: Normal range of motion and neck supple.   Neurological:      General: No focal deficit present.      Mental Status: She is oriented to person, place, and time. Mental status is at baseline.   Psychiatric:         Mood and Affect: Mood normal.         Behavior: Behavior normal.         Thought Content: Thought content normal.     Inpatient Medications:  cefTRIAXone, 1 g, intravenous, q24h  fat emulsion-plant based, 150 mL, intravenous, Daily Lipids  fluconazole, 400 mg, intravenous, q24h  iohexol, 500 mL, oral, Once in imaging  metroNIDAZOLE,  500 mg, intravenous, q8h  pantoprazole, 40 mg, intravenous, BID  sucralfate, 1 g, oral, q6h LPÓEZ    PRN Medications  PRN medications: alteplase, diphenhydrAMINE, LORazepam, naloxone, ondansetron **OR** ondansetron, traZODone  Continuous Medications:  Adult Clinimix Parenteral Nutrition, 50 mL/hr, Last Rate: 50 mL/hr (08/11/24 2337)  HYDROmorphone,       LABS AND IMAGING:     Labs:  Results from last 7 days   Lab Units 08/12/24  0513 08/11/24  0432 08/10/24  0529   WBC AUTO x10*3/uL 7.6 7.0 7.6   RBC AUTO x10*6/uL 3.52* 3.49* 3.33*   HEMOGLOBIN g/dL 10.0* 10.0* 9.2*   HEMATOCRIT % 31.6* 30.6* 29.0*   MCV fL 90 88 87   MCH pg 28.4 28.7 27.6   MCHC g/dL 31.6* 32.7 31.7*   RDW % 17.6* 17.8* 18.1*   PLATELETS AUTO x10*3/uL 228 236 275     Results from last 7 days   Lab Units 08/12/24  0513 08/11/24  0432 08/10/24  0529   SODIUM mmol/L 137 139 139   POTASSIUM mmol/L 3.7 4.8 4.4   CHLORIDE mmol/L 106 111* 111*   CO2 mmol/L 27 26 25   BUN mg/dL 5* 3* 7   CREATININE mg/dL 0.63 0.60 0.69   GLUCOSE mg/dL 113* 98 109*   PROTEIN TOTAL g/dL 5.3* 5.1* 4.6*   CALCIUM mg/dL 8.4* 8.3* 7.8*   BILIRUBIN TOTAL mg/dL 0.3 0.3 0.2   ALK PHOS U/L 39 38 37   AST U/L 8* 8* 8*   ALT U/L 9 11 10     Results from last 7 days   Lab Units 08/12/24  0513 08/11/24  0432 08/10/24  0529   MAGNESIUM mg/dL 1.88 2.06 1.94         Imaging:  ECG 12 lead  Sinus tachycardia  Otherwise normal ECG  No previous ECGs available    See ED provider note for full interpretation and clinical correlation  Confirmed by Evelyn Jose ([id number]) on 8/11/2024 5:36:47 PM

## 2024-08-13 LAB
ALBUMIN SERPL BCP-MCNC: 3.2 G/DL (ref 3.4–5)
ALP SERPL-CCNC: 38 U/L (ref 33–110)
ALT SERPL W P-5'-P-CCNC: 7 U/L (ref 7–45)
ANION GAP SERPL CALC-SCNC: 8 MMOL/L (ref 10–20)
AST SERPL W P-5'-P-CCNC: 7 U/L (ref 9–39)
BASOPHILS # BLD AUTO: 0.01 X10*3/UL (ref 0–0.1)
BASOPHILS NFR BLD AUTO: 0.1 %
BILIRUB SERPL-MCNC: 0.3 MG/DL (ref 0–1.2)
BUN SERPL-MCNC: 5 MG/DL (ref 6–23)
CALCIUM SERPL-MCNC: 8.2 MG/DL (ref 8.6–10.3)
CHLORIDE SERPL-SCNC: 105 MMOL/L (ref 98–107)
CO2 SERPL-SCNC: 29 MMOL/L (ref 21–32)
CREAT SERPL-MCNC: 0.56 MG/DL (ref 0.5–1.05)
EGFRCR SERPLBLD CKD-EPI 2021: >90 ML/MIN/1.73M*2
EOSINOPHIL # BLD AUTO: 0.26 X10*3/UL (ref 0–0.7)
EOSINOPHIL NFR BLD AUTO: 3.8 %
ERYTHROCYTE [DISTWIDTH] IN BLOOD BY AUTOMATED COUNT: 17 % (ref 11.5–14.5)
GLUCOSE SERPL-MCNC: 94 MG/DL (ref 74–99)
HCG UR QL IA.RAPID: NEGATIVE
HCT VFR BLD AUTO: 29.7 % (ref 36–46)
HGB BLD-MCNC: 9.5 G/DL (ref 12–16)
IMM GRANULOCYTES # BLD AUTO: 0.01 X10*3/UL (ref 0–0.7)
IMM GRANULOCYTES NFR BLD AUTO: 0.1 % (ref 0–0.9)
LYMPHOCYTES # BLD AUTO: 1.5 X10*3/UL (ref 1.2–4.8)
LYMPHOCYTES NFR BLD AUTO: 21.7 %
MAGNESIUM SERPL-MCNC: 1.87 MG/DL (ref 1.6–2.4)
MCH RBC QN AUTO: 28.2 PG (ref 26–34)
MCHC RBC AUTO-ENTMCNC: 32 G/DL (ref 32–36)
MCV RBC AUTO: 88 FL (ref 80–100)
MONOCYTES # BLD AUTO: 0.72 X10*3/UL (ref 0.1–1)
MONOCYTES NFR BLD AUTO: 10.4 %
NEUTROPHILS # BLD AUTO: 4.42 X10*3/UL (ref 1.2–7.7)
NEUTROPHILS NFR BLD AUTO: 63.9 %
NRBC BLD-RTO: 0 /100 WBCS (ref 0–0)
PLATELET # BLD AUTO: 199 X10*3/UL (ref 150–450)
POTASSIUM SERPL-SCNC: 3.8 MMOL/L (ref 3.5–5.3)
PROT SERPL-MCNC: 5.1 G/DL (ref 6.4–8.2)
RBC # BLD AUTO: 3.37 X10*6/UL (ref 4–5.2)
SODIUM SERPL-SCNC: 138 MMOL/L (ref 136–145)
WBC # BLD AUTO: 6.9 X10*3/UL (ref 4.4–11.3)

## 2024-08-13 PROCEDURE — 81025 URINE PREGNANCY TEST: CPT

## 2024-08-13 PROCEDURE — 99231 SBSQ HOSP IP/OBS SF/LOW 25: CPT | Performed by: REGISTERED NURSE

## 2024-08-13 PROCEDURE — 83735 ASSAY OF MAGNESIUM: CPT | Performed by: SURGERY

## 2024-08-13 PROCEDURE — 2500000001 HC RX 250 WO HCPCS SELF ADMINISTERED DRUGS (ALT 637 FOR MEDICARE OP): Performed by: STUDENT IN AN ORGANIZED HEALTH CARE EDUCATION/TRAINING PROGRAM

## 2024-08-13 PROCEDURE — 2500000004 HC RX 250 GENERAL PHARMACY W/ HCPCS (ALT 636 FOR OP/ED): Performed by: STUDENT IN AN ORGANIZED HEALTH CARE EDUCATION/TRAINING PROGRAM

## 2024-08-13 PROCEDURE — 99232 SBSQ HOSP IP/OBS MODERATE 35: CPT

## 2024-08-13 PROCEDURE — 1210000001 HC SEMI-PRIVATE ROOM DAILY

## 2024-08-13 PROCEDURE — 2500000001 HC RX 250 WO HCPCS SELF ADMINISTERED DRUGS (ALT 637 FOR MEDICARE OP): Performed by: INTERNAL MEDICINE

## 2024-08-13 PROCEDURE — 2500000004 HC RX 250 GENERAL PHARMACY W/ HCPCS (ALT 636 FOR OP/ED): Performed by: INTERNAL MEDICINE

## 2024-08-13 PROCEDURE — 99232 SBSQ HOSP IP/OBS MODERATE 35: CPT | Performed by: HOSPITALIST

## 2024-08-13 PROCEDURE — 85025 COMPLETE CBC W/AUTO DIFF WBC: CPT | Performed by: SURGERY

## 2024-08-13 PROCEDURE — 2500000002 HC RX 250 W HCPCS SELF ADMINISTERED DRUGS (ALT 637 FOR MEDICARE OP, ALT 636 FOR OP/ED): Performed by: REGISTERED NURSE

## 2024-08-13 PROCEDURE — 80053 COMPREHEN METABOLIC PANEL: CPT | Performed by: SURGERY

## 2024-08-13 PROCEDURE — 2500000004 HC RX 250 GENERAL PHARMACY W/ HCPCS (ALT 636 FOR OP/ED): Performed by: SURGERY

## 2024-08-13 RX ORDER — OLANZAPINE 5 MG/1
10 TABLET ORAL NIGHTLY
Status: DISCONTINUED | OUTPATIENT
Start: 2024-08-13 | End: 2024-08-16 | Stop reason: HOSPADM

## 2024-08-13 RX ORDER — FLUCONAZOLE 2 MG/ML
400 INJECTION, SOLUTION INTRAVENOUS EVERY 24 HOURS
Status: DISCONTINUED | OUTPATIENT
Start: 2024-08-14 | End: 2024-08-14 | Stop reason: ALTCHOICE

## 2024-08-13 ASSESSMENT — COGNITIVE AND FUNCTIONAL STATUS - GENERAL
CLIMB 3 TO 5 STEPS WITH RAILING: A LITTLE
DAILY ACTIVITIY SCORE: 24
MOBILITY SCORE: 22
WALKING IN HOSPITAL ROOM: A LITTLE

## 2024-08-13 ASSESSMENT — PAIN SCALES - GENERAL
PAINLEVEL_OUTOF10: 0 - NO PAIN
PAINLEVEL_OUTOF10: 0 - NO PAIN

## 2024-08-13 ASSESSMENT — PAIN - FUNCTIONAL ASSESSMENT: PAIN_FUNCTIONAL_ASSESSMENT: 0-10

## 2024-08-13 NOTE — PROGRESS NOTES
Psych consulted for pt's increased anxiety while admitted. She reported history of Bipolar, ADHD, PTSD, Cocaine use ( tox negative for cocaine but positive for Fentanyl), and a past 5west admission for SI. Not currently on any medications but was open to trying Zyprexa. Will NP with psych services gave pt information for [facility] follow up to which he charts the pt was open to. Still planned for dc back to [facility] custody as pt is currently furloughed. [facility] confirmed furlough paperwork is in pt's paper chart.

## 2024-08-13 NOTE — PROGRESS NOTES
"Lisa Montemayor is a 28 y.o. female on day 7 of admission presenting with multiple contained perforated duodenal ulcers, psych history of bipolar disorder.    Subjective   Patient reports improved mood today, much less anxious.  Pain is improved.  Patient tolerated Zyprexa well last night.  States she still had some difficulty with sleep.  Advised patient that we will increase dose to 10 mg oral daily at bedtime tonight.  Patient in agreement with plan.  Patient reports she is interested in residential AYE treatment.  Will have social work discuss treatment options with patient.    Objective     MSE  General: Appropriately groomed and dressed.  Appearance: Appears stated age.  Attitude: Calm, cooperative.  Behavior: Appropriate eye contact.  Motor activity: No agitation or retardation. no EPS.  Normal gait.  Speech: Regular rate, rhythm, volume and tone.  Mood: Less anxious  Affect: Appropriate range  Thought process: Organized, linear, goal-directed.  Associations are logical.  Thought content: Does not endorse suicidal or homicidal ideation, no delusions elicited.  Thought perception: Did not endorse auditory or visual hallucinations.  Cognition: Alert, oriented x3.  no deficit in memory or attention.  Insight: Fair.  Judgment: Fair.    Last Recorded Vitals  /67   Pulse (!) 111   Temp 37.2 °C (99 °F)   Resp 14   Ht 1.6 m (5' 3\")   Wt 57.7 kg (127 lb 3.3 oz)   SpO2 97%   BMI 22.53 kg/m²      Relevant Results  Scheduled medications  cefTRIAXone, 1 g, intravenous, q24h  [START ON 8/14/2024] fluconazole, 400 mg, intravenous, q24h  metroNIDAZOLE, 500 mg, intravenous, q8h  OLANZapine, 10 mg, oral, Nightly  pantoprazole, 40 mg, intravenous, BID  sucralfate, 1 g, oral, q6h LÓPEZ      Continuous medications  HYDROmorphone,       PRN medications  PRN medications: alteplase, diphenhydrAMINE, LORazepam, naloxone, ondansetron **OR** ondansetron, traZODone    Results for orders placed or performed during the " hospital encounter of 08/05/24 (from the past 24 hour(s))   CBC and Auto Differential   Result Value Ref Range    WBC 6.9 4.4 - 11.3 x10*3/uL    nRBC 0.0 0.0 - 0.0 /100 WBCs    RBC 3.37 (L) 4.00 - 5.20 x10*6/uL    Hemoglobin 9.5 (L) 12.0 - 16.0 g/dL    Hematocrit 29.7 (L) 36.0 - 46.0 %    MCV 88 80 - 100 fL    MCH 28.2 26.0 - 34.0 pg    MCHC 32.0 32.0 - 36.0 g/dL    RDW 17.0 (H) 11.5 - 14.5 %    Platelets 199 150 - 450 x10*3/uL    Neutrophils % 63.9 40.0 - 80.0 %    Immature Granulocytes %, Automated 0.1 0.0 - 0.9 %    Lymphocytes % 21.7 13.0 - 44.0 %    Monocytes % 10.4 2.0 - 10.0 %    Eosinophils % 3.8 0.0 - 6.0 %    Basophils % 0.1 0.0 - 2.0 %    Neutrophils Absolute 4.42 1.20 - 7.70 x10*3/uL    Immature Granulocytes Absolute, Automated 0.01 0.00 - 0.70 x10*3/uL    Lymphocytes Absolute 1.50 1.20 - 4.80 x10*3/uL    Monocytes Absolute 0.72 0.10 - 1.00 x10*3/uL    Eosinophils Absolute 0.26 0.00 - 0.70 x10*3/uL    Basophils Absolute 0.01 0.00 - 0.10 x10*3/uL   Comprehensive Metabolic Panel   Result Value Ref Range    Glucose 94 74 - 99 mg/dL    Sodium 138 136 - 145 mmol/L    Potassium 3.8 3.5 - 5.3 mmol/L    Chloride 105 98 - 107 mmol/L    Bicarbonate 29 21 - 32 mmol/L    Anion Gap 8 (L) 10 - 20 mmol/L    Urea Nitrogen 5 (L) 6 - 23 mg/dL    Creatinine 0.56 0.50 - 1.05 mg/dL    eGFR >90 >60 mL/min/1.73m*2    Calcium 8.2 (L) 8.6 - 10.3 mg/dL    Albumin 3.2 (L) 3.4 - 5.0 g/dL    Alkaline Phosphatase 38 33 - 110 U/L    Total Protein 5.1 (L) 6.4 - 8.2 g/dL    AST 7 (L) 9 - 39 U/L    Bilirubin, Total 0.3 0.0 - 1.2 mg/dL    ALT 7 7 - 45 U/L   Magnesium   Result Value Ref Range    Magnesium 1.87 1.60 - 2.40 mg/dL   hCG, Urine, Qualitative   Result Value Ref Range    HCG, Urine NEGATIVE NEGATIVE        Assessment/Plan   Diagnosis:  Bipolar disorder, anxiety    Recommendations:     -- Medications:          -Increase Zyprexa to 10 mg oral daily at bedtime           -Continue Ativan PRN for anxiety    -- Social work to discuss  AYE treatment options with patient.     -- Discussed recommendations with primary team.     --Patient to follow-up with the [facility] for outpatient psych services     -- Psychiatry will follow

## 2024-08-13 NOTE — PROGRESS NOTES
General Surgery Progress Note    Patient: Lisa Montemayor  Unit/Bed: 1107/1107-A  YOB: 1995  MRN: [id number]  Acct: [id number]   Admitting Diagnosis: GI bleeding [K92.2]  Date:  8/5/2024  Hospital Day: 7  Attending: Izabela Boudreaux*    Complaint:  Chief Complaint   Patient presents with    Black or Bloody Stool      Subjective  Patient seen and examined this morning.  She states her abdominal pain is improved, but she complains of right arm pain that is not controlled with her current pain regimen.  She states she is tolerating the clear liquid diet and denies nausea or vomiting.  PHYSICAL EXAM:  Physical Exam  Constitutional:       Comments: Appears uncomfortable   HENT:      Head: Normocephalic and atraumatic.      Mouth/Throat:      Mouth: Mucous membranes are moist.   Eyes:      Conjunctiva/sclera: Conjunctivae normal.   Pulmonary:      Effort: Pulmonary effort is normal. No respiratory distress.   Abdominal:      General: Abdomen is flat. There is no distension.      Palpations: Abdomen is soft.      Tenderness: There is abdominal tenderness in the right upper quadrant, right lower quadrant and epigastric area. There is no guarding.   Musculoskeletal:         General: Tenderness (right upper arm) present. No swelling.   Skin:     General: Skin is warm and dry.   Neurological:      Mental Status: She is alert.   Psychiatric:         Mood and Affect: Mood normal.         Behavior: Behavior normal.       Vital signs in last 24 hours:  Vitals:    08/13/24 1303   BP: 108/67   Pulse: (!) 111   Resp: 14   Temp: 37.2 °C (99 °F)   SpO2: 97%     Intake/Output this shift:    Intake/Output Summary (Last 24 hours) at 8/13/2024 1500  Last data filed at 8/13/2024 1330  Gross per 24 hour   Intake 4280.47 ml   Output 700 ml   Net 3580.47 ml      Allergies:  Allergies   Allergen Reactions    Amoxicillin Hives    Penicillins Hives      Medications:  Scheduled medications  cefTRIAXone, 1 g, intravenous,  q24h  [START ON 8/14/2024] fluconazole, 400 mg, intravenous, q24h  metroNIDAZOLE, 500 mg, intravenous, q8h  OLANZapine, 5 mg, oral, Nightly  pantoprazole, 40 mg, intravenous, BID  sucralfate, 1 g, oral, q6h LÓPEZ      Continuous medications  HYDROmorphone,       PRN medications  PRN medications: alteplase, diphenhydrAMINE, LORazepam, naloxone, ondansetron **OR** ondansetron, traZODone  Labs:  Results for orders placed or performed during the hospital encounter of 08/05/24 (from the past 24 hour(s))   CBC and Auto Differential   Result Value Ref Range    WBC 6.9 4.4 - 11.3 x10*3/uL    nRBC 0.0 0.0 - 0.0 /100 WBCs    RBC 3.37 (L) 4.00 - 5.20 x10*6/uL    Hemoglobin 9.5 (L) 12.0 - 16.0 g/dL    Hematocrit 29.7 (L) 36.0 - 46.0 %    MCV 88 80 - 100 fL    MCH 28.2 26.0 - 34.0 pg    MCHC 32.0 32.0 - 36.0 g/dL    RDW 17.0 (H) 11.5 - 14.5 %    Platelets 199 150 - 450 x10*3/uL    Neutrophils % 63.9 40.0 - 80.0 %    Immature Granulocytes %, Automated 0.1 0.0 - 0.9 %    Lymphocytes % 21.7 13.0 - 44.0 %    Monocytes % 10.4 2.0 - 10.0 %    Eosinophils % 3.8 0.0 - 6.0 %    Basophils % 0.1 0.0 - 2.0 %    Neutrophils Absolute 4.42 1.20 - 7.70 x10*3/uL    Immature Granulocytes Absolute, Automated 0.01 0.00 - 0.70 x10*3/uL    Lymphocytes Absolute 1.50 1.20 - 4.80 x10*3/uL    Monocytes Absolute 0.72 0.10 - 1.00 x10*3/uL    Eosinophils Absolute 0.26 0.00 - 0.70 x10*3/uL    Basophils Absolute 0.01 0.00 - 0.10 x10*3/uL   Comprehensive Metabolic Panel   Result Value Ref Range    Glucose 94 74 - 99 mg/dL    Sodium 138 136 - 145 mmol/L    Potassium 3.8 3.5 - 5.3 mmol/L    Chloride 105 98 - 107 mmol/L    Bicarbonate 29 21 - 32 mmol/L    Anion Gap 8 (L) 10 - 20 mmol/L    Urea Nitrogen 5 (L) 6 - 23 mg/dL    Creatinine 0.56 0.50 - 1.05 mg/dL    eGFR >90 >60 mL/min/1.73m*2    Calcium 8.2 (L) 8.6 - 10.3 mg/dL    Albumin 3.2 (L) 3.4 - 5.0 g/dL    Alkaline Phosphatase 38 33 - 110 U/L    Total Protein 5.1 (L) 6.4 - 8.2 g/dL    AST 7 (L) 9 - 39 U/L     Bilirubin, Total 0.3 0.0 - 1.2 mg/dL    ALT 7 7 - 45 U/L   Magnesium   Result Value Ref Range    Magnesium 1.87 1.60 - 2.40 mg/dL      Imaging:  CT abdomen pelvis w IV and oral contrast    Result Date: 8/13/2024  Interpreted By:  Adelso Bowman, STUDY: CT ABDOMEN PELVIS W IV AND ORAL CONTRAST;  8/12/2024 1:21 pm   INDICATION: Signs/Symptoms:Duodenal ulcer comparison previous imaging.   COMPARISON: CT angiogram abdomen and pelvis 08/05/2024   ACCESSION NUMBER(S): [id number]   ORDERING CLINICIAN: MODESTA TEMPLETON   TECHNIQUE: CT of the abdomen and pelvis with intravenous (75 mL Omnipaque 350) and oral contrast (500 mL Omnipaque 12).   FINDINGS: Lower chest: No focal consolidation or pleural effusion.   Liver: No mass.   Biliary: No intrahepatic or extrahepatic bile duct dilation. No cholelithiasis.   Spleen: No mass. No splenomegaly.   Pancreas: No mass or duct dilation.   Adrenals: Normal.   Kidneys: No suspicious mass, calculus or hydronephrosis.   GI tract: Previously shown wall thickening involving the duodenal bulb with adjacent free air is no longer present. There is trace amount of free fluid remaining surrounding the 2nd duodenum and gallbladder fossa without new organized fluid collection. No extraluminal enteric contrast extravasation with pooling on today's examination. Previously described clot within the stomach is not well seen given diffuse hyperdensities related to enteric contrast administration or has resolved. No bowel dilation. Normal appendix   Lymph nodes: No lymphadenopathy.   Mesentery/peritoneum: Resolved periduodenal free air. No new free fluid, free air or fluid collection.   Vasculature: No abdominal aortic aneurysmal dilation.   Pelvis: No free fluid, free air or fluid collection.   Bones/Soft tissues: Minimal degenerative endplate changes in the lower thoracic spine. Moderate size L3-L4 anterior osteophytes.       No extraluminal enteric contrast extravasation or pooling demonstrated on  today's CT. No new organized intra-abdominal fluid collection or free air.   MACRO: None   Signed by: Adelso Bowman 8/13/2024 8:48 AM Dictation workstation:   [id number]    Vascular US upper extremity venous duplex right    Result Date: 8/13/2024  Interpreted By:  Solo Beaver, STUDY: [facility] US UPPER EXTREMITY VENOUS DUPLEX RIGHT;  8/12/2024 2:58 pm   INDICATION: Signs/Symptoms:RUE pain/swelling at PICC site.  Pain and swelling   COMPARISON: None.   ACCESSION NUMBER(S): [id number]   ORDERING CLINICIAN: SERAFIN PERRY   TECHNIQUE: Vascular ultrasound of the  right upper extremity was performed. Evaluation was performed with grayscale, color, and spectral Doppler. When possible, compression views of the evaluated veins was also performed.   FINDINGS: Evaluation of the visualized portions of the internal jugular, subclavian, axillary, brachial, cephalic, and basilic veins was performed.   There is occlusive thrombosis of the visible portions of the subclavian vein as well as the basilic vein.   There is normal respiratory variation, normal compressibility, as well as normal color doppler signal in the remaining visualized vessels.       Positive for occlusive thrombosis of the right subclavian and basilic veins.   MACRO: None.   Signed by: Solo Beaver 8/13/2024 8:13 AM Dictation workstation:   [id number]    Assessment  UGI Bleed  Multiple duodenal ulcers  DVT right upper extremity    The patient states her abdominal pain is improved, but she complains of right arm pain that is not controlled with her current pain regimen.  She states she is tolerating the clear liquid diet and denies nausea or vomiting.  On exam, abdomen is soft, nondistended, tenderness to palpation in the epigastric, RUQ, and RLQ, tenderness to palpation in the right upper arm.  CT abdomen and pelvis with IV and oral contrast yesterday showed no contrast extravasation or new fluid collection or free air.  Right upper extremity vascular ultrasound  showed DVT, medicine team states they are removing her PICC line and stopping PPN.    Plan  -Dr. Escobar will perform EGD tomorrow. Urine pregnancy test ordered.  -Advance to full liquid diet. NPO after midnight.  -Continue IV antibiotics, ID consulted for outpatient antibiotic recommendations  -Discontinue IVF  -Continue pantoprazole and sucralfate for duodenal ulcers  -Pain control-PCA pump being managed by Dr. Escobar  -Nausea control  -DVT prophylaxis-SCDs. Recommend starting sub q heparin as Hgb is stable   -Pulmonary toileting   -Encourage ambulation  -Continue care per primary team       Further recommendations per Dr. Luz Reid PA-C    Time spent  40  minutes obtaining labs, imaging, recommendations, interview, assessment, examination, medication review/ordering, and EMR review.    Plan of care was discussed extensively with patient. Patient verbalized understanding through teach back method. All questions and concerns addressed upon examination.     Of note, this documentation is completed using the Dragon Dictation system (voice recognition software). There may be spelling and/or grammatical errors that were not corrected prior to final submission.

## 2024-08-13 NOTE — PROGRESS NOTES
"PROGRESS NOTE    ASSESSMENT AND PLAN:     Multiple contained perforated duodenal ulcers  - CT abdomen/pelvis with IV contrast from 8/6 reviewed  - Repeat CT abdomen/pelvis with IV and oral contrast on 8/12 showed no acute findings.    Plan:  - General Surgery following  - Continuing Protonix 40 mg IV twice daily  - Remains on a Dilaudid PCA pump as well  -Tolerating full liquids, discontinue PPN due to upper extremity thrombosis  - GI signed off  - Continue intra-abdominal prophylaxis with fluconazole, ceftriaxone, metronidazole     Upper GI bleed  Acute blood loss anemia  - Patient denied regular aspirin/NSAID use  - Was having hematemesis and rectal bleeding for few days prior to admission, CT abdomen/pelvis with large clot seen as well  - Hemoglobin 10.0 today (6.3 on admission, status post 2 units PBC transfusion)  - Continue Protonix as per above     RUE pain  - Pain on exam today  - Specials assessed PICC line and no issues there  - Venous duplex RUE ordered    Right subclavian basilic vein DVT  -This is most likely related to PICC line.  -Given her history of GI bleed, discontinue PICC line.  -Advised warm compression.    Disposition: Discharge when patient is off Dilaudid pump.    SUBJECTIVE:   Admit Date: 8/5/2024    Interval History: Still continues to complain of significant abdominal pain.  Tolerating a full liquid diet.      OBJECTIVE:   Vitals: /67   Pulse (!) 111   Temp 37.2 °C (99 °F)   Resp 14   Ht 1.6 m (5' 3\")   Wt 57.7 kg (127 lb 3.3 oz)   SpO2 97%   BMI 22.53 kg/m²    Wt Readings from Last 3 Encounters:   08/06/24 57.7 kg (127 lb 3.3 oz)      24HR INTAKE/OUTPUT:    Intake/Output Summary (Last 24 hours) at 8/13/2024 1326  Last data filed at 8/13/2024 1313  Gross per 24 hour   Intake 4746.3 ml   Output 700 ml   Net 4046.3 ml       PHYSICAL EXAM:   GENERAL: Laying in bed, does not appear to be in any distress.   HEENT: HEAD: Normocephalic atraumatic.  Neck: Supple.  Eyes: Pupils are " "reactive to direct light.   CVS: S1, S2 heard. Regular rate and rhythm  LUNGS: Clear to auscultate bilaterally. No wheezing or rhonchi appreciated.  ABDOMEN: Soft, nontender to palpate. Positive bowel sounds. No guarding or rebound appreciated.  NEUROLOGICAL: No focal neurological deficits appreciated. Cranial nerves are grossly intact.  EXTREMITIES: No edema appreciated.  SKIN:  Grossly intact, warm and dry.      LABS/IMAGING AND MEDICATIONS:   Scheduled Meds:cefTRIAXone, 1 g, intravenous, q24h  fat emulsion-plant based, 150 mL, intravenous, Daily Lipids  fluconazole, 400 mg, intravenous, q24h  metroNIDAZOLE, 500 mg, intravenous, q8h  OLANZapine, 5 mg, oral, Nightly  pantoprazole, 40 mg, intravenous, BID  sucralfate, 1 g, oral, q6h LÓPEZ      PRN Meds:PRN medications: alteplase, diphenhydrAMINE, LORazepam, naloxone, ondansetron **OR** ondansetron, traZODone    No lab exists for component: \"CBC\"   No lab exists for component: \"CMP\"   No lab exists for component: \"TROPONIN\"          No lab exists for component: \"LIPIDS\"       No lab exists for component: \"URINALYSIS\"          BMP:  Results from last 7 days   Lab Units 08/13/24  0613 08/12/24  0513 08/11/24  0432   SODIUM mmol/L 138 137 139   POTASSIUM mmol/L 3.8 3.7 4.8   CHLORIDE mmol/L 105 106 111*   CO2 mmol/L 29 27 26   BUN mg/dL 5* 5* 3*   CREATININE mg/dL 0.56 0.63 0.60       CBC:  Results from last 7 days   Lab Units 08/13/24  0613 08/12/24  0513 08/11/24  0432   WBC AUTO x10*3/uL 6.9 7.6 7.0   RBC AUTO x10*6/uL 3.37* 3.52* 3.49*   HEMOGLOBIN g/dL 9.5* 10.0* 10.0*   HEMATOCRIT % 29.7* 31.6* 30.6*   MCV fL 88 90 88   MCH pg 28.2 28.4 28.7   MCHC g/dL 32.0 31.6* 32.7   RDW % 17.0* 17.6* 17.8*   PLATELETS AUTO x10*3/uL 199 228 236       Cardiac Enzymes:           Hepatic Function Panel:  Results from last 7 days   Lab Units 08/13/24  0613 08/12/24  0513 08/11/24  0432   ALK PHOS U/L 38 39 38   ALT U/L 7 9 11   AST U/L 7* 8* 8*   PROTEIN TOTAL g/dL 5.1* 5.3* 5.1* " "  BILIRUBIN TOTAL mg/dL 0.3 0.3 0.3       Magnesium:  Results from last 7 days   Lab Units 08/13/24  0613   MAGNESIUM mg/dL 1.87       Pro-BNP:  No results found for: \"PROBNP\"    INR:        TSH:  No results found for: \"TSH\"    Lipid Profile:        No lab exists for component: \"LABVLDL\"    HgbA1C:        Lactate Level:  No lab exists for component: \"LACTA\"    CMP:  Results from last 7 days   Lab Units 08/13/24  0613 08/12/24  0513 08/11/24  0432   SODIUM mmol/L 138 137 139   POTASSIUM mmol/L 3.8 3.7 4.8   CHLORIDE mmol/L 105 106 111*   CO2 mmol/L 29 27 26   BUN mg/dL 5* 5* 3*   CREATININE mg/dL 0.56 0.63 0.60   GLUCOSE mg/dL 94 113* 98   CALCIUM mg/dL 8.2* 8.4* 8.3*   PROTEIN TOTAL g/dL 5.1* 5.3* 5.1*   BILIRUBIN TOTAL mg/dL 0.3 0.3 0.3   ALK PHOS U/L 38 39 38   AST U/L 7* 8* 8*   ALT U/L 7 9 11       Amylase:        Lipase:        ABG:        No lab exists for component: \"PO2\", \"PCO2\", \"HCO3\", \"BE\", \"O2SAT\"       "

## 2024-08-13 NOTE — CARE PLAN
The patient's goals for the shift include Labs WNL    The clinical goals for the shift include Patient's pain will be managed to a tolerable level throughout this shift.    Over the shift, the patient did not make progress toward the following goals. Barriers to progression include . Recommendations to address these barriers include .

## 2024-08-13 NOTE — CARE PLAN
Problem: Skin  Goal: Prevent/manage excess moisture  8/13/2024 1355 by Annette Olivares RN  Outcome: Progressing  8/13/2024 1051 by Annette Olivares RN  Flowsheets (Taken 8/13/2024 0851)  Prevent/manage excess moisture: Moisturize dry skin  Goal: Prevent/minimize sheer/friction injuries  8/13/2024 1355 by Annette Olivares RN  Outcome: Progressing  8/13/2024 1051 by Annette Olivares RN  Flowsheets (Taken 8/13/2024 0851)  Prevent/minimize sheer/friction injuries: Increase activity/out of bed for meals     Problem: Pain - Adult  Goal: Verbalizes/displays adequate comfort level or baseline comfort level  8/13/2024 1355 by Annette Olivares RN  Outcome: Progressing  8/13/2024 1051 by Annette Olivares RN  Flowsheets (Taken 8/13/2024 0851)  Verbalizes/displays adequate comfort level or baseline comfort level:   Encourage patient to monitor pain and request assistance   Assess pain using appropriate pain scale   Administer analgesics based on type and severity of pain and evaluate response   Implement non-pharmacological measures as appropriate and evaluate response   Consider cultural and social influences on pain and pain management   Notify Licensed Independent Practitioner if interventions unsuccessful or patient reports new pain     Problem: Safety - Adult  Goal: Free from fall injury  8/13/2024 1355 by Annette Olivares RN  Outcome: Progressing  8/13/2024 1051 by Annette Olivares RN  Flowsheets (Taken 8/13/2024 0851)  Free from fall injury: Instruct family/caregiver on patient safety     Problem: Discharge Planning  Goal: Discharge to home or other facility with appropriate resources  8/13/2024 1355 by Annette Olivares RN  Outcome: Progressing  8/13/2024 1051 by Annette Olivares RN  Flowsheets (Taken 8/13/2024 0851)  Discharge to home or other facility with appropriate resources:   Identify barriers to discharge with patient and caregiver   Arrange for needed discharge resources and transportation as appropriate    Identify discharge learning needs (meds, wound care, etc)   Refer to discharge planning if patient needs post-hospital services based on physician order or complex needs related to functional status, cognitive ability or social support system     Problem: Fall/Injury  Goal: Not fall by end of shift  Outcome: Progressing  Goal: Be free from injury by end of the shift  Outcome: Progressing  Goal: Verbalize understanding of personal risk factors for fall in the hospital  Outcome: Progressing  Goal: Verbalize understanding of risk factor reduction measures to prevent injury from fall in the home  Outcome: Progressing  Goal: Use assistive devices by end of the shift  Outcome: Progressing  Goal: Pace activities to prevent fatigue by end of the shift  Outcome: Progressing     Problem: Pain  Goal: Takes deep breaths with improved pain control throughout the shift  Outcome: Progressing  Goal: Turns in bed with improved pain control throughout the shift  Outcome: Progressing  Goal: Walks with improved pain control throughout the shift  Outcome: Progressing  Goal: Performs ADL's with improved pain control throughout shift  Outcome: Progressing  Goal: Participates in PT with improved pain control throughout the shift  Outcome: Progressing  Goal: Free from opioid side effects throughout the shift  Outcome: Progressing  Goal: Free from acute confusion related to pain meds throughout the shift  Outcome: Progressing   The patient's goals for the shift include Labs WNL    The clinical goals for the shift include Patient's pain will be managed to a tolerable level throughout this shift.    Over the shift, the patient did make progress toward care plan goals.

## 2024-08-14 ENCOUNTER — ANESTHESIA (OUTPATIENT)
Dept: GASTROENTEROLOGY | Facility: HOSPITAL | Age: 29
End: 2024-08-14
Payer: MEDICAID

## 2024-08-14 ENCOUNTER — APPOINTMENT (OUTPATIENT)
Dept: GASTROENTEROLOGY | Facility: HOSPITAL | Age: 29
End: 2024-08-14
Payer: MEDICAID

## 2024-08-14 ENCOUNTER — ANESTHESIA EVENT (OUTPATIENT)
Dept: GASTROENTEROLOGY | Facility: HOSPITAL | Age: 29
End: 2024-08-14
Payer: MEDICAID

## 2024-08-14 LAB
ALBUMIN SERPL BCP-MCNC: 3.3 G/DL (ref 3.4–5)
ALP SERPL-CCNC: 48 U/L (ref 33–110)
ALT SERPL W P-5'-P-CCNC: 7 U/L (ref 7–45)
ANION GAP SERPL CALC-SCNC: 12 MMOL/L (ref 10–20)
AST SERPL W P-5'-P-CCNC: 9 U/L (ref 9–39)
BASOPHILS # BLD AUTO: 0.02 X10*3/UL (ref 0–0.1)
BASOPHILS NFR BLD AUTO: 0.2 %
BILIRUB SERPL-MCNC: 0.3 MG/DL (ref 0–1.2)
BUN SERPL-MCNC: 6 MG/DL (ref 6–23)
CALCIUM SERPL-MCNC: 8.3 MG/DL (ref 8.6–10.3)
CHLORIDE SERPL-SCNC: 104 MMOL/L (ref 98–107)
CO2 SERPL-SCNC: 23 MMOL/L (ref 21–32)
CREAT SERPL-MCNC: 0.62 MG/DL (ref 0.5–1.05)
EGFRCR SERPLBLD CKD-EPI 2021: >90 ML/MIN/1.73M*2
EOSINOPHIL # BLD AUTO: 0.16 X10*3/UL (ref 0–0.7)
EOSINOPHIL NFR BLD AUTO: 1.8 %
ERYTHROCYTE [DISTWIDTH] IN BLOOD BY AUTOMATED COUNT: 16.1 % (ref 11.5–14.5)
GLUCOSE SERPL-MCNC: 96 MG/DL (ref 74–99)
HCT VFR BLD AUTO: 31.6 % (ref 36–46)
HGB BLD-MCNC: 10.1 G/DL (ref 12–16)
IMM GRANULOCYTES # BLD AUTO: 0.03 X10*3/UL (ref 0–0.7)
IMM GRANULOCYTES NFR BLD AUTO: 0.3 % (ref 0–0.9)
LYMPHOCYTES # BLD AUTO: 1.66 X10*3/UL (ref 1.2–4.8)
LYMPHOCYTES NFR BLD AUTO: 18.9 %
MAGNESIUM SERPL-MCNC: 1.79 MG/DL (ref 1.6–2.4)
MCH RBC QN AUTO: 28 PG (ref 26–34)
MCHC RBC AUTO-ENTMCNC: 32 G/DL (ref 32–36)
MCV RBC AUTO: 88 FL (ref 80–100)
MONOCYTES # BLD AUTO: 0.94 X10*3/UL (ref 0.1–1)
MONOCYTES NFR BLD AUTO: 10.7 %
NEUTROPHILS # BLD AUTO: 5.95 X10*3/UL (ref 1.2–7.7)
NEUTROPHILS NFR BLD AUTO: 68.1 %
NRBC BLD-RTO: 0 /100 WBCS (ref 0–0)
PLATELET # BLD AUTO: 162 X10*3/UL (ref 150–450)
POTASSIUM SERPL-SCNC: 4.2 MMOL/L (ref 3.5–5.3)
PROT SERPL-MCNC: 5.4 G/DL (ref 6.4–8.2)
RBC # BLD AUTO: 3.61 X10*6/UL (ref 4–5.2)
SODIUM SERPL-SCNC: 135 MMOL/L (ref 136–145)
WBC # BLD AUTO: 8.8 X10*3/UL (ref 4.4–11.3)

## 2024-08-14 PROCEDURE — 2500000004 HC RX 250 GENERAL PHARMACY W/ HCPCS (ALT 636 FOR OP/ED): Mod: JZ | Performed by: INTERNAL MEDICINE

## 2024-08-14 PROCEDURE — 2500000001 HC RX 250 WO HCPCS SELF ADMINISTERED DRUGS (ALT 637 FOR MEDICARE OP): Performed by: HOSPITALIST

## 2024-08-14 PROCEDURE — 80053 COMPREHEN METABOLIC PANEL: CPT | Performed by: SURGERY

## 2024-08-14 PROCEDURE — 99232 SBSQ HOSP IP/OBS MODERATE 35: CPT | Performed by: HOSPITALIST

## 2024-08-14 PROCEDURE — 2500000004 HC RX 250 GENERAL PHARMACY W/ HCPCS (ALT 636 FOR OP/ED): Performed by: STUDENT IN AN ORGANIZED HEALTH CARE EDUCATION/TRAINING PROGRAM

## 2024-08-14 PROCEDURE — 36415 COLL VENOUS BLD VENIPUNCTURE: CPT | Performed by: SURGERY

## 2024-08-14 PROCEDURE — 3700000001 HC GENERAL ANESTHESIA TIME - INITIAL BASE CHARGE

## 2024-08-14 PROCEDURE — 99231 SBSQ HOSP IP/OBS SF/LOW 25: CPT | Performed by: SURGERY

## 2024-08-14 PROCEDURE — 2500000001 HC RX 250 WO HCPCS SELF ADMINISTERED DRUGS (ALT 637 FOR MEDICARE OP): Performed by: SURGERY

## 2024-08-14 PROCEDURE — 85025 COMPLETE CBC W/AUTO DIFF WBC: CPT | Performed by: SURGERY

## 2024-08-14 PROCEDURE — 43239 EGD BIOPSY SINGLE/MULTIPLE: CPT | Performed by: SURGERY

## 2024-08-14 PROCEDURE — 99231 SBSQ HOSP IP/OBS SF/LOW 25: CPT | Performed by: REGISTERED NURSE

## 2024-08-14 PROCEDURE — 2500000002 HC RX 250 W HCPCS SELF ADMINISTERED DRUGS (ALT 637 FOR MEDICARE OP, ALT 636 FOR OP/ED): Performed by: REGISTERED NURSE

## 2024-08-14 PROCEDURE — 3700000002 HC GENERAL ANESTHESIA TIME - EACH INCREMENTAL 1 MINUTE

## 2024-08-14 PROCEDURE — 2500000001 HC RX 250 WO HCPCS SELF ADMINISTERED DRUGS (ALT 637 FOR MEDICARE OP): Performed by: STUDENT IN AN ORGANIZED HEALTH CARE EDUCATION/TRAINING PROGRAM

## 2024-08-14 PROCEDURE — 1210000001 HC SEMI-PRIVATE ROOM DAILY

## 2024-08-14 PROCEDURE — 2500000004 HC RX 250 GENERAL PHARMACY W/ HCPCS (ALT 636 FOR OP/ED): Performed by: HOSPITALIST

## 2024-08-14 PROCEDURE — 0DB78ZX EXCISION OF STOMACH, PYLORUS, VIA NATURAL OR ARTIFICIAL OPENING ENDOSCOPIC, DIAGNOSTIC: ICD-10-PCS | Performed by: SURGERY

## 2024-08-14 PROCEDURE — 83735 ASSAY OF MAGNESIUM: CPT | Performed by: SURGERY

## 2024-08-14 PROCEDURE — 2500000004 HC RX 250 GENERAL PHARMACY W/ HCPCS (ALT 636 FOR OP/ED): Performed by: SURGERY

## 2024-08-14 PROCEDURE — 2500000004 HC RX 250 GENERAL PHARMACY W/ HCPCS (ALT 636 FOR OP/ED): Performed by: NURSE ANESTHETIST, CERTIFIED REGISTERED

## 2024-08-14 PROCEDURE — 0DB98ZX EXCISION OF DUODENUM, VIA NATURAL OR ARTIFICIAL OPENING ENDOSCOPIC, DIAGNOSTIC: ICD-10-PCS | Performed by: SURGERY

## 2024-08-14 PROCEDURE — 2500000005 HC RX 250 GENERAL PHARMACY W/O HCPCS: Performed by: NURSE ANESTHETIST, CERTIFIED REGISTERED

## 2024-08-14 RX ORDER — FENTANYL CITRATE 50 UG/ML
25 INJECTION, SOLUTION INTRAMUSCULAR; INTRAVENOUS EVERY 5 MIN PRN
OUTPATIENT
Start: 2024-08-14

## 2024-08-14 RX ORDER — OXYCODONE AND ACETAMINOPHEN 5; 325 MG/1; MG/1
1 TABLET ORAL EVERY 4 HOURS PRN
Status: DISCONTINUED | OUTPATIENT
Start: 2024-08-14 | End: 2024-08-15

## 2024-08-14 RX ORDER — SODIUM CHLORIDE, SODIUM LACTATE, POTASSIUM CHLORIDE, CALCIUM CHLORIDE 600; 310; 30; 20 MG/100ML; MG/100ML; MG/100ML; MG/100ML
100 INJECTION, SOLUTION INTRAVENOUS CONTINUOUS
OUTPATIENT
Start: 2024-08-14

## 2024-08-14 RX ORDER — LABETALOL HYDROCHLORIDE 5 MG/ML
5 INJECTION, SOLUTION INTRAVENOUS ONCE AS NEEDED
OUTPATIENT
Start: 2024-08-14

## 2024-08-14 RX ORDER — ACETAMINOPHEN 325 MG/1
650 TABLET ORAL EVERY 4 HOURS PRN
Status: DISCONTINUED | OUTPATIENT
Start: 2024-08-14 | End: 2024-08-16 | Stop reason: HOSPADM

## 2024-08-14 RX ORDER — DEXTROMETHORPHAN/PSEUDOEPHED 2.5-7.5/.8
DROPS ORAL AS NEEDED
Status: COMPLETED | OUTPATIENT
Start: 2024-08-14 | End: 2024-08-14

## 2024-08-14 RX ORDER — METRONIDAZOLE 250 MG/1
500 TABLET ORAL EVERY 8 HOURS SCHEDULED
Status: DISCONTINUED | OUTPATIENT
Start: 2024-08-14 | End: 2024-08-16 | Stop reason: HOSPADM

## 2024-08-14 RX ORDER — LIDOCAINE HYDROCHLORIDE 20 MG/ML
INJECTION, SOLUTION EPIDURAL; INFILTRATION; INTRACAUDAL; PERINEURAL AS NEEDED
Status: DISCONTINUED | OUTPATIENT
Start: 2024-08-14 | End: 2024-08-14

## 2024-08-14 RX ORDER — OXYCODONE AND ACETAMINOPHEN 5; 325 MG/1; MG/1
1 TABLET ORAL EVERY 4 HOURS PRN
Status: DISCONTINUED | OUTPATIENT
Start: 2024-08-14 | End: 2024-08-14

## 2024-08-14 RX ORDER — PROPOFOL 10 MG/ML
INJECTION, EMULSION INTRAVENOUS AS NEEDED
Status: DISCONTINUED | OUTPATIENT
Start: 2024-08-14 | End: 2024-08-14

## 2024-08-14 RX ORDER — FLUCONAZOLE 100 MG/1
400 TABLET ORAL DAILY
Status: DISCONTINUED | OUTPATIENT
Start: 2024-08-14 | End: 2024-08-16 | Stop reason: HOSPADM

## 2024-08-14 RX ORDER — ACETAMINOPHEN 160 MG/5ML
650 SOLUTION ORAL EVERY 4 HOURS PRN
Status: DISCONTINUED | OUTPATIENT
Start: 2024-08-14 | End: 2024-08-16 | Stop reason: HOSPADM

## 2024-08-14 RX ORDER — HYDROXYZINE HYDROCHLORIDE 25 MG/1
25 TABLET, FILM COATED ORAL EVERY 8 HOURS PRN
Status: DISCONTINUED | OUTPATIENT
Start: 2024-08-14 | End: 2024-08-16 | Stop reason: HOSPADM

## 2024-08-14 RX ORDER — SODIUM CHLORIDE, SODIUM LACTATE, POTASSIUM CHLORIDE, CALCIUM CHLORIDE 600; 310; 30; 20 MG/100ML; MG/100ML; MG/100ML; MG/100ML
INJECTION, SOLUTION INTRAVENOUS CONTINUOUS PRN
Status: DISCONTINUED | OUTPATIENT
Start: 2024-08-14 | End: 2024-08-14

## 2024-08-14 RX ORDER — ONDANSETRON HYDROCHLORIDE 2 MG/ML
4 INJECTION, SOLUTION INTRAVENOUS ONCE AS NEEDED
OUTPATIENT
Start: 2024-08-14

## 2024-08-14 RX ORDER — OXYCODONE HYDROCHLORIDE 5 MG/1
5 TABLET ORAL EVERY 4 HOURS PRN
OUTPATIENT
Start: 2024-08-14

## 2024-08-14 RX ORDER — HYDROMORPHONE HYDROCHLORIDE 1 MG/ML
0.6 INJECTION, SOLUTION INTRAMUSCULAR; INTRAVENOUS; SUBCUTANEOUS EVERY 4 HOURS PRN
Status: DISCONTINUED | OUTPATIENT
Start: 2024-08-14 | End: 2024-08-15

## 2024-08-14 SDOH — HEALTH STABILITY: MENTAL HEALTH: CURRENT SMOKER: 1

## 2024-08-14 ASSESSMENT — COGNITIVE AND FUNCTIONAL STATUS - GENERAL
MOBILITY SCORE: 24
DAILY ACTIVITIY SCORE: 24
MOBILITY SCORE: 24
DAILY ACTIVITIY SCORE: 24

## 2024-08-14 ASSESSMENT — PAIN - FUNCTIONAL ASSESSMENT
PAIN_FUNCTIONAL_ASSESSMENT: 0-10
PAIN_FUNCTIONAL_ASSESSMENT: 0-10

## 2024-08-14 ASSESSMENT — PAIN SCALES - GENERAL
PAINLEVEL_OUTOF10: 3
PAINLEVEL_OUTOF10: 10 - WORST POSSIBLE PAIN
PAINLEVEL_OUTOF10: 10 - WORST POSSIBLE PAIN
PAIN_LEVEL: 1
PAINLEVEL_OUTOF10: 8

## 2024-08-14 ASSESSMENT — PAIN DESCRIPTION - ORIENTATION: ORIENTATION: RIGHT

## 2024-08-14 ASSESSMENT — PAIN DESCRIPTION - LOCATION
LOCATION: ARM
LOCATION: ARM

## 2024-08-14 ASSESSMENT — LIFESTYLE VARIABLES: SUBSTANCE_ABUSE_PAST_12_MONTHS: YES

## 2024-08-14 NOTE — ANESTHESIA PREPROCEDURE EVALUATION
Lisa Montemayor is a 28 y.o. female here for:    Esophagogastroduodenoscopy (EGD)  With Goran Escobar MD  Duodenal ulcer    Relevant Problems   GI   (+) Acute gastritis with hemorrhage   (+) Duodenal ulcer   (+) GI bleeding      Hematology   (+) Acute blood loss anemia       Lab Results   Component Value Date    HGB 10.1 (L) 08/14/2024    HCT 31.6 (L) 08/14/2024    WBC 8.8 08/14/2024     08/14/2024     (L) 08/14/2024    K 4.2 08/14/2024     08/14/2024    CREATININE 0.62 08/14/2024    BUN 6 08/14/2024       Social History     Tobacco Use   Smoking Status Every Day    Current packs/day: 1.00    Average packs/day: 1 pack/day for 18.0 years (18.0 ttl pk-yrs)    Types: Cigarettes    Passive exposure: Current   Smokeless Tobacco Never       Allergies   Allergen Reactions    Amoxicillin Hives    Penicillins Hives       No current outpatient medications    No past surgical history on file.    No family history on file.    NPO Details:  No data recorded    Physical Exam    Airway  Mallampati: II  TM distance: >3 FB  Neck ROM: full     Cardiovascular - normal exam     Dental    Pulmonary - normal exam     Abdominal            Anesthesia Plan    History of general anesthesia?: no  History of complications of general anesthesia?: no    ASA 2     MAC     The patient is a current smoker.    intravenous induction   Anesthetic plan and risks discussed with patient.    Plan discussed with CRNA.

## 2024-08-14 NOTE — SIGNIFICANT EVENT
Spoke with the patient today in the endoscopy suite.    Found the patient had retained gastric contents and food particles in the stomach despite being NPO.  She had a fairly tight stricture in the first part of the duodenum along with what appeared to be at least 1 a large clean-based ulcer that was about 1 and half to 2 cm in size.  I biopsied the duodenum and the antrum for H. pylori.    I am currently discussing with GI for the next step of care, but for right now I would keep her on the antibiotics and keep her on a strict liquid diet.  And concerned that she is developing gastric outlet obstruction.  Hopefully this will be able to be tolerated just with liquid diet but we will keep a close eye on her.    Goran Escobar MD  08/14/24  3:34 PM

## 2024-08-14 NOTE — DISCHARGE INSTRUCTIONS
Continue a full liquid diet for 1-2 weeks.    Call the office or go to the ER if:  You have a fever above 100.4  Cannot eat or drink  Have trouble breathing  Have chest pain or shortness of breath  Pain is uncontrolled      -Please continue to take your antibiotics for 3 weeks.  Please continue your probiotics.   -Please follow-up outpatient for a CT abdomen pelvis with IV and oral contrast.  -You have been prescribed Percocet as needed for pain control, it is a narcotic medication and can cause sedation, addiction and constipation.  He is use it sparingly.  You have also been prescribed intranasal Narcan.  Please do not drive or operate heavy machinery.  -You were found to have a deep vein thrombosis in her upper extremity.  Please follow-up with your primary care doctor in regards to repeating an ultrasound in 4 to 6 weeks.

## 2024-08-14 NOTE — PROGRESS NOTES
"Lisa Montemayor is a 28 y.o. female on day 8 of admission presenting with  multiple contained perforated duodenal ulcers, psych history of bipolar disorder.     Subjective   Patient reports significantly improved mood.  States she is sleeping better.  Tolerating Zyprexa well, with no adverse effects.  Still waiting to speak with social work about options for residential AYE treatment.  Patient states she plans to follow-up with the [facility] for outpatient psych services.    Objective     MSE  General: Appropriately groomed and dressed.  Appearance: Appears stated age.  Attitude: Calm, cooperative.  Behavior: Appropriate eye contact.  Motor activity: No agitation or retardation. no EPS.  Normal gait.  Speech: Regular rate, rhythm, volume and tone.  Mood: \"Good \"  Affect: Appropriate range  Thought process: Organized, linear, goal-directed.  Associations are logical.  Thought content: Does not endorse suicidal or homicidal ideation, no delusions elicited.  Thought perception: Did not endorse auditory or visual hallucinations.  Cognition: Alert, oriented x3.  no deficit in memory or attention.  Insight: Fair.  Judgment: Fair.    Last Recorded Vitals  /70 (BP Location: Left arm, Patient Position: Lying)   Pulse 106   Temp 36.7 °C (98.1 °F) (Temporal)   Resp 18   Ht 1.6 m (5' 3\")   Wt 57.7 kg (127 lb 3.3 oz)   SpO2 98%   BMI 22.53 kg/m²      Relevant Results  Scheduled medications  cefTRIAXone, 1 g, intravenous, q24h  fluconazole, 400 mg, oral, Daily  metroNIDAZOLE, 500 mg, oral, q8h LÓPEZ  OLANZapine, 10 mg, oral, Nightly  pantoprazole, 40 mg, intravenous, BID  sucralfate, 1 g, oral, q6h LÓPEZ      Continuous medications  HYDROmorphone,       PRN medications  PRN medications: alteplase, diphenhydrAMINE, LORazepam, naloxone, ondansetron **OR** ondansetron, traZODone    Results for orders placed or performed during the hospital encounter of 08/05/24 (from the past 24 hour(s))   hCG, Urine, Qualitative "   Result Value Ref Range    HCG, Urine NEGATIVE NEGATIVE   CBC and Auto Differential   Result Value Ref Range    WBC 8.8 4.4 - 11.3 x10*3/uL    nRBC 0.0 0.0 - 0.0 /100 WBCs    RBC 3.61 (L) 4.00 - 5.20 x10*6/uL    Hemoglobin 10.1 (L) 12.0 - 16.0 g/dL    Hematocrit 31.6 (L) 36.0 - 46.0 %    MCV 88 80 - 100 fL    MCH 28.0 26.0 - 34.0 pg    MCHC 32.0 32.0 - 36.0 g/dL    RDW 16.1 (H) 11.5 - 14.5 %    Platelets 162 150 - 450 x10*3/uL    Neutrophils % 68.1 40.0 - 80.0 %    Immature Granulocytes %, Automated 0.3 0.0 - 0.9 %    Lymphocytes % 18.9 13.0 - 44.0 %    Monocytes % 10.7 2.0 - 10.0 %    Eosinophils % 1.8 0.0 - 6.0 %    Basophils % 0.2 0.0 - 2.0 %    Neutrophils Absolute 5.95 1.20 - 7.70 x10*3/uL    Immature Granulocytes Absolute, Automated 0.03 0.00 - 0.70 x10*3/uL    Lymphocytes Absolute 1.66 1.20 - 4.80 x10*3/uL    Monocytes Absolute 0.94 0.10 - 1.00 x10*3/uL    Eosinophils Absolute 0.16 0.00 - 0.70 x10*3/uL    Basophils Absolute 0.02 0.00 - 0.10 x10*3/uL   Comprehensive Metabolic Panel   Result Value Ref Range    Glucose 96 74 - 99 mg/dL    Sodium 135 (L) 136 - 145 mmol/L    Potassium 4.2 3.5 - 5.3 mmol/L    Chloride 104 98 - 107 mmol/L    Bicarbonate 23 21 - 32 mmol/L    Anion Gap 12 10 - 20 mmol/L    Urea Nitrogen 6 6 - 23 mg/dL    Creatinine 0.62 0.50 - 1.05 mg/dL    eGFR >90 >60 mL/min/1.73m*2    Calcium 8.3 (L) 8.6 - 10.3 mg/dL    Albumin 3.3 (L) 3.4 - 5.0 g/dL    Alkaline Phosphatase 48 33 - 110 U/L    Total Protein 5.4 (L) 6.4 - 8.2 g/dL    AST 9 9 - 39 U/L    Bilirubin, Total 0.3 0.0 - 1.2 mg/dL    ALT 7 7 - 45 U/L   Magnesium   Result Value Ref Range    Magnesium 1.79 1.60 - 2.40 mg/dL        Assessment/Plan   Diagnosis:  Bipolar disorder, anxiety     Recommendations:     -- Medications:          -Zyprexa to 10 mg oral daily at bedtime           -Continue Ativan PRN for anxiety     -- Social work to discuss AYE treatment options with patient.     -- Discussed recommendations with primary team.      --Patient to follow-up with the [facility] for outpatient psych services     -- Psychiatry will sign off, please reconsult as needed

## 2024-08-14 NOTE — PROGRESS NOTES
08/14/24 1500   Living Arrangement   Living Arrangement Other (Comment)  (Correctional Facility)   Legal   Legal Concerns Patient on Medical Furlough and is to return to correctional facility at discharge.   Drug Screening   Have you used any substances (canabis, cocaine, heroin, hallucinogens, inhalants, etc.) in the past 12 months? Yes  (Fentanyl. Pt reports she snorts or smokes the substance.)   Stage of Change   History of Treatment Inpatient  (Was at [facility] in [facility] about a month ago. Pt expresses desire to return to this recovery center. Pt not interested in any other treatment facilities.)   Treatment Offered Resources/education provided   Duration of Substance Use 2 years using Fentanyl   Orientation   Orientation Level Oriented X4   Step 1: Risk Factors   Current & Past Psychiatric Dx Mood disorder;Psychotic disorder;Alcohol/substance abuse disorders;PTSD  (Per pt report)     Pt has to return to correctional facility once medically cleared for discharge.

## 2024-08-14 NOTE — PROGRESS NOTES
Per Dr Corado, pt tolerating Full Liquid diet. TPN discontinued. Still on PCA pump. Planned for EGD today with Dr Escobar. Per psych NP Will, pt requesting SW to provide outpt drug treatment information. Radha OWEN updated.

## 2024-08-14 NOTE — CARE PLAN
The patient's goals for the shift include no pain    The clinical goals for the shift include Patient will have labs WNL throughout shift    Over the shift, the patient did not make progress toward the following goals. Barriers to progression include; none. Recommendations to address these barriers include; continue current plan of care.      Problem: Pain - Adult  Goal: Verbalizes/displays adequate comfort level or baseline comfort level  Outcome: Progressing  Flowsheets (Taken 8/14/2024 1202)  Verbalizes/displays adequate comfort level or baseline comfort level:   Encourage patient to monitor pain and request assistance   Administer analgesics based on type and severity of pain and evaluate response   Consider cultural and social influences on pain and pain management   Assess pain using appropriate pain scale   Implement non-pharmacological measures as appropriate and evaluate response   Notify Licensed Independent Practitioner if interventions unsuccessful or patient reports new pain     Problem: Safety - Adult  Goal: Free from fall injury  Outcome: Progressing  Flowsheets (Taken 8/14/2024 1202)  Free from fall injury: Instruct family/caregiver on patient safety

## 2024-08-14 NOTE — PROGRESS NOTES
"Nutrition Follow Up Assessment:   Nutrition Assessment         Patient is a 28 y.o. female presenting with GI bleeding      Nutrition History:  Food and Nutrient History: RD follow-up. TPN and PICC line discontinued 8/13 d/t upper extremity thrombosis. Was tolerating full liquids 8/13. Per surgery note on 8/13, plans to continue full liquids for a few weeks. NPO at time of RD visit for pending EGD with surgery. Lethargic at time of RD visit, frequently falling asleep during discussion. MD plans to discontinue PCA pump. Once diet advances back to full liquids, is agreeable to Magic Cup BID, requesting chocolate flavor.  Food Allergies/Intolerances:  None  GI Symptoms:  none per nursing documentation  Oral Problems: None       Anthropometrics:  Height: 160 cm (5' 3\")   Weight: 57.7 kg (127 lb 3.3 oz)   BMI (Calculated): 22.54             Weight Change %:  Weight History / % Weight Change: No new wt to review    Nutrition Focused Physical Exam Findings:  defer: previously completed    Edema:  Edema Location: non-pitting RUE per nursing assessment  Physical Findings:  Skin: Negative (for pressure injuries)    Nutrition Significant Labs:  BMP Trend:   Results from last 7 days   Lab Units 08/14/24  0634 08/13/24  0613 08/12/24  0513 08/11/24  0432   GLUCOSE mg/dL 96 94 113* 98   CALCIUM mg/dL 8.3* 8.2* 8.4* 8.3*   SODIUM mmol/L 135* 138 137 139   POTASSIUM mmol/L 4.2 3.8 3.7 4.8   CO2 mmol/L 23 29 27 26   CHLORIDE mmol/L 104 105 106 111*   BUN mg/dL 6 5* 5* 3*   CREATININE mg/dL 0.62 0.56 0.63 0.60        Nutrition Specific Medications:  Reviewed    I/O:   Last BM Date: 08/13/24; Stool Appearance: Soft (08/13/24 1500)    Dietary Orders (From admission, onward)       Start     Ordered    08/14/24 0001  NPO Diet; Effective midnight  Diet effective midnight         08/13/24 1358                     Estimated Needs:   Total Energy Estimated Needs (kCal): 1585 kCal  Method for Estimating Needs: 1608-9593 kcal (25-30 " kcal/kg)  Total Protein Estimated Needs (g): 58 g  Method for Estimating Needs: 1 g/kg  Total Fluid Estimated Needs (mL): 1585 mL  Method for Estimating Needs: 1 ml/kcal or per MD        Nutrition Diagnosis   Malnutrition Diagnosis  Patient has Malnutrition Diagnosis: No    Nutrition Diagnosis  Patient has Nutrition Diagnosis: Yes  Diagnosis Status (1): Ongoing  Nutrition Diagnosis 1: Inadequate oral intake  Related to (1): altered GI function  As Evidenced by (1): NPO for EGD today       Nutrition Interventions/Recommendations         Nutrition Prescription:  Individualized Nutrition Prescription Provided for : NPO for EGD. Diet advancement per MD when medically indicated. Magic Cups BID when advanced to full liquids.        Nutrition Interventions:   Interventions: Meals and snacks, Medical food supplement  Goal: Consumes 3 meals per day when diet advances  Medical Food Supplement: Commercial beverage  Goal: Magic cup BID (290 kcal, 9 g protein per serving) when diet advances    Collaboration and Referral of Nutrition Care: Collaboration by nutrition professional with other providers  Goal: interdisciplinary rounds; MD    Nutrition Education:      Not appropriate at this time 2/2 lethargy. To re-assess need for nutrition education at next RD follow-up.    Nutrition Monitoring and Evaluation   Food/Nutrient Related History Monitoring  Monitoring and Evaluation Plan: Energy intake, Amount of food, Fluid intake  Energy Intake: Estimated energy intake  Criteria: Pt meets >75% of estimated energy needs when diet advances  Fluid Intake: Estimated fluid intake  Criteria: Meets >75% of estimated fluid needs when diet advances  Amount of Food: Estimated amout of food, Medical food intake  Criteria: Pt consumes >75% of meals and supplements when diet advances    Body Composition/Growth/Weight History  Monitoring and Evaluation Plan: Weight  Weight: Measured weight  Criteria: Maintains stable weight    Biochemical Data,  Medical Tests and Procedures  Monitoring and Evaluation Plan: Glucose/endocrine profile, Electrolyte/renal panel  Electrolyte and Renal Panel: Sodium, Potassium, Phosphorus  Criteria: Electrolytes WNL  Glucose/Endocrine Profile: Glucose, casual  Criteria: BG within desirable range              Time Spent (min): 30 minutes

## 2024-08-14 NOTE — PROGRESS NOTES
Lisa Montemayor is a 28 y.o. female on day 0 of admission presenting with No Principal Problem: There is no principal problem currently on the Problem List. Please update the Problem List and refresh..    Subjective   ***       Objective     Physical Exam    Last Recorded Vitals  There were no vitals taken for this visit.  Intake/Output last 3 Shifts:  I/O last 3 completed shifts:  In: 2335.5 [P.O.:990; I.V.:8.8; IV Piggyback:250]  Out: 700 [Urine:700]    Relevant Results  {If you would like to pull in Medications, type .meds     If you would like to pull in Lab results for the last 24 hours, type .[id number]    If you would like to pull in Imaging results, type .imgrslt :99}    {Link to Stroke Scoring tools - Link :99}                        Assessment/Plan   Assessment & Plan    ***     {This patient does not have an ACP note on file for this encounter, please fill one out - Advance Care Planning Activity :99}  I spent *** minutes in the professional and overall care of this patient.      Ha Booker MD

## 2024-08-14 NOTE — ANESTHESIA POSTPROCEDURE EVALUATION
Patient: Lisa Montemayor    Procedure Summary       Date: 08/14/24 Room / Location: [facility]    Anesthesia Start: 1444 Anesthesia Stop:     Procedure: EGD Diagnosis: Duodenal ulcer    Scheduled Providers: Goran Escobar MD; Javed Gill DO Responsible Provider: Ha Booker MD    Anesthesia Type: MAC ASA Status: 2            Anesthesia Type: MAC    Vitals Value Taken Time   BP 86/50 08/14/24 1509   Temp - 08/14/24 1509   Pulse 123 08/14/24 1509   Resp 16 08/14/24 1509   SpO2 95 08/14/24 1509       Anesthesia Post Evaluation    Patient location during evaluation: floor  Patient participation: complete - patient participated  Level of consciousness: awake  Pain score: 1  Pain management: adequate  Airway patency: patent  Cardiovascular status: acceptable  Respiratory status: acceptable  Hydration status: acceptable  Postoperative Nausea and Vomiting: none        No notable events documented.

## 2024-08-14 NOTE — PROGRESS NOTES
"PROGRESS NOTE    ASSESSMENT AND PLAN:    Multiple contained perforated duodenal ulcers  - CT abdomen/pelvis with IV contrast from 8/6 reviewed  - Repeat CT abdomen/pelvis with IV and oral contrast on 8/12 showed no acute findings.     Plan:  - General Surgery following, plan for EGD today.  - Continuing Protonix 40 mg IV twice daily  - Remains on a Dilaudid PCA pump as well  -Tolerating full liquids, discontinued PPN due to upper extremity thrombosis  - Continue intra-abdominal prophylaxis with fluconazole, ceftriaxone, metronidazole     Upper GI bleed  Acute blood loss anemia  - Patient denied regular aspirin/NSAID use  - Was having hematemesis and rectal bleeding for few days prior to admission, CT abdomen/pelvis with large clot seen as well  - Hemoglobin 10.0 today (6.3 on admission, status post 2 units PBC transfusion)  - Continue Protonix as per above       Right subclavian basilic vein DVT  -This is most likely related to PICC line.  -Given her history of GI bleed, discontinued PICC line.  -Advised warm compression.  -Spoke with oNri, plan to observe for now     Disposition: Plan is for her to come off of the Dilaudid PCA pump and switch her to regular scheduled medications.      SUBJECTIVE:   Admit Date: 8/5/2024    Interval History: Severely lethargic, but arousable.  Unable to obtain review of systems.      OBJECTIVE:   Vitals: /64   Pulse 107   Temp 36.6 °C (97.9 °F)   Resp 16   Ht 1.6 m (5' 3\")   Wt 57.7 kg (127 lb 3.3 oz)   SpO2 97%   BMI 22.53 kg/m²    Wt Readings from Last 3 Encounters:   08/06/24 57.7 kg (127 lb 3.3 oz)      24HR INTAKE/OUTPUT:    Intake/Output Summary (Last 24 hours) at 8/14/2024 1105  Last data filed at 8/14/2024 1030  Gross per 24 hour   Intake 1129.17 ml   Output --   Net 1129.17 ml       PHYSICAL EXAM:   GENERAL: Laying in bed, does not appear to be in any distress.  Lethargic.  HEENT: HEAD: Normocephalic atraumatic.  Neck: Supple.  Eyes: Pupils are reactive to " "direct light.   CVS: S1, S2 heard. Regular rate and rhythm  LUNGS: Clear to auscultate bilaterally. No wheezing or rhonchi appreciated.  ABDOMEN: Soft, nontender to palpate. Positive bowel sounds. No guarding or rebound appreciated.  NEUROLOGICAL: No focal neurological deficits appreciated. Cranial nerves are grossly intact.  EXTREMITIES: No edema appreciated.  SKIN:  Grossly intact, warm and dry.      LABS/IMAGING AND MEDICATIONS:   Scheduled Meds:cefTRIAXone, 1 g, intravenous, q24h  fluconazole, 400 mg, intravenous, q24h  metroNIDAZOLE, 500 mg, intravenous, q8h  OLANZapine, 10 mg, oral, Nightly  pantoprazole, 40 mg, intravenous, BID  sucralfate, 1 g, oral, q6h LÓPEZ      PRN Meds:PRN medications: alteplase, diphenhydrAMINE, LORazepam, naloxone, ondansetron **OR** ondansetron, traZODone    No lab exists for component: \"CBC\"   No lab exists for component: \"CMP\"   No lab exists for component: \"TROPONIN\"          No lab exists for component: \"LIPIDS\"       No lab exists for component: \"URINALYSIS\"          BMP:  Results from last 7 days   Lab Units 08/14/24  0634 08/13/24  0613 08/12/24  0513   SODIUM mmol/L 135* 138 137   POTASSIUM mmol/L 4.2 3.8 3.7   CHLORIDE mmol/L 104 105 106   CO2 mmol/L 23 29 27   BUN mg/dL 6 5* 5*   CREATININE mg/dL 0.62 0.56 0.63       CBC:  Results from last 7 days   Lab Units 08/14/24  0634 08/13/24  0613 08/12/24  0513   WBC AUTO x10*3/uL 8.8 6.9 7.6   RBC AUTO x10*6/uL 3.61* 3.37* 3.52*   HEMOGLOBIN g/dL 10.1* 9.5* 10.0*   HEMATOCRIT % 31.6* 29.7* 31.6*   MCV fL 88 88 90   MCH pg 28.0 28.2 28.4   MCHC g/dL 32.0 32.0 31.6*   RDW % 16.1* 17.0* 17.6*   PLATELETS AUTO x10*3/uL 162 199 228       Cardiac Enzymes:           Hepatic Function Panel:  Results from last 7 days   Lab Units 08/14/24  0634 08/13/24  0613 08/12/24  0513   ALK PHOS U/L 48 38 39   ALT U/L 7 7 9   AST U/L 9 7* 8*   PROTEIN TOTAL g/dL 5.4* 5.1* 5.3*   BILIRUBIN TOTAL mg/dL 0.3 0.3 0.3       Magnesium:  Results from last 7 " "days   Lab Units 08/14/24  0634   MAGNESIUM mg/dL 1.79       Pro-BNP:  No results found for: \"PROBNP\"    INR:        TSH:  No results found for: \"TSH\"    Lipid Profile:        No lab exists for component: \"LABVLDL\"    HgbA1C:        Lactate Level:  No lab exists for component: \"LACTA\"    CMP:  Results from last 7 days   Lab Units 08/14/24  0634 08/13/24  0613 08/12/24  0513   SODIUM mmol/L 135* 138 137   POTASSIUM mmol/L 4.2 3.8 3.7   CHLORIDE mmol/L 104 105 106   CO2 mmol/L 23 29 27   BUN mg/dL 6 5* 5*   CREATININE mg/dL 0.62 0.56 0.63   GLUCOSE mg/dL 96 94 113*   CALCIUM mg/dL 8.3* 8.2* 8.4*   PROTEIN TOTAL g/dL 5.4* 5.1* 5.3*   BILIRUBIN TOTAL mg/dL 0.3 0.3 0.3   ALK PHOS U/L 48 38 39   AST U/L 9 7* 8*   ALT U/L 7 7 9       Amylase:        Lipase:        ABG:        No lab exists for component: \"PO2\", \"PCO2\", \"HCO3\", \"BE\", \"O2SAT\"       "

## 2024-08-14 NOTE — CARE PLAN
The patient's goals for the shift include Pain control    The clinical goals for the shift include Patient will remain hemodynamically stable through end of shift      Problem: Skin  Goal: Prevent/manage excess moisture  Outcome: Progressing  Goal: Prevent/minimize sheer/friction injuries  Outcome: Progressing     Problem: Pain - Adult  Goal: Verbalizes/displays adequate comfort level or baseline comfort level  Outcome: Progressing     Problem: Safety - Adult  Goal: Free from fall injury  Outcome: Progressing     Problem: Discharge Planning  Goal: Discharge to home or other facility with appropriate resources  Outcome: Progressing     Problem: Fall/Injury  Goal: Not fall by end of shift  Outcome: Progressing  Goal: Be free from injury by end of the shift  Outcome: Progressing  Goal: Verbalize understanding of personal risk factors for fall in the hospital  Outcome: Progressing  Goal: Verbalize understanding of risk factor reduction measures to prevent injury from fall in the home  Outcome: Progressing  Goal: Use assistive devices by end of the shift  Outcome: Progressing  Goal: Pace activities to prevent fatigue by end of the shift  Outcome: Progressing     Problem: Pain  Goal: Takes deep breaths with improved pain control throughout the shift  Outcome: Progressing  Goal: Turns in bed with improved pain control throughout the shift  Outcome: Progressing  Goal: Walks with improved pain control throughout the shift  Outcome: Progressing  Goal: Performs ADL's with improved pain control throughout shift  Outcome: Progressing  Goal: Participates in PT with improved pain control throughout the shift  Outcome: Progressing  Goal: Free from opioid side effects throughout the shift  Outcome: Progressing  Goal: Free from acute confusion related to pain meds throughout the shift  Outcome: Progressing

## 2024-08-14 NOTE — PROGRESS NOTES
General Surgery Progress Note    Patient: Lisa Montemayor  Unit/Bed: 1107/1107-A  YOB: 1995  MRN: [id number]  Acct: [id number]   Admitting Diagnosis: GI bleeding [K92.2]  Date:  8/5/2024  Hospital Day: 8  Attending: Izabela Boudreaux*    Complaint:  Chief Complaint   Patient presents with    Black or Bloody Stool      Subjective  Patient seen and examined this morning. No acute events overnight.  She states her pain is better today.  She is trying to sleep.  She has no complaints at this time.  PHYSICAL EXAM:  Physical Exam  Constitutional:       General: She is not in acute distress.     Comments: Sleepy but arousable.   HENT:      Head: Normocephalic and atraumatic.      Mouth/Throat:      Mouth: Mucous membranes are moist.   Eyes:      Conjunctiva/sclera: Conjunctivae normal.   Pulmonary:      Effort: Pulmonary effort is normal. No respiratory distress.   Abdominal:      General: Abdomen is flat. There is no distension.      Palpations: Abdomen is soft.      Tenderness: There is abdominal tenderness (lower abdomen). There is no guarding.   Musculoskeletal:         General: Tenderness (right upper arm) present.   Skin:     General: Skin is warm and dry.   Psychiatric:         Mood and Affect: Mood normal.         Behavior: Behavior normal.       Vital signs in last 24 hours:  Vitals:    08/14/24 1200   BP: 123/70   Pulse: 106   Resp: 18   Temp: 36.7 °C (98.1 °F)   SpO2: 98%     Intake/Output this shift:    Intake/Output Summary (Last 24 hours) at 8/14/2024 1406  Last data filed at 8/14/2024 1030  Gross per 24 hour   Intake 350 ml   Output --   Net 350 ml      Allergies:  Allergies   Allergen Reactions    Amoxicillin Hives    Penicillins Hives      Medications:  Scheduled medications  cefTRIAXone, 1 g, intravenous, q24h  fluconazole, 400 mg, oral, Daily  metroNIDAZOLE, 500 mg, oral, q8h LÓPEZ  OLANZapine, 10 mg, oral, Nightly  pantoprazole, 40 mg, intravenous, BID  sucralfate, 1 g, oral, q6h  LÓPEZ      Continuous medications  HYDROmorphone,       PRN medications  PRN medications: alteplase, diphenhydrAMINE, LORazepam, naloxone, ondansetron **OR** ondansetron, traZODone  Labs:  Results for orders placed or performed during the hospital encounter of 08/05/24 (from the past 24 hour(s))   hCG, Urine, Qualitative   Result Value Ref Range    HCG, Urine NEGATIVE NEGATIVE   CBC and Auto Differential   Result Value Ref Range    WBC 8.8 4.4 - 11.3 x10*3/uL    nRBC 0.0 0.0 - 0.0 /100 WBCs    RBC 3.61 (L) 4.00 - 5.20 x10*6/uL    Hemoglobin 10.1 (L) 12.0 - 16.0 g/dL    Hematocrit 31.6 (L) 36.0 - 46.0 %    MCV 88 80 - 100 fL    MCH 28.0 26.0 - 34.0 pg    MCHC 32.0 32.0 - 36.0 g/dL    RDW 16.1 (H) 11.5 - 14.5 %    Platelets 162 150 - 450 x10*3/uL    Neutrophils % 68.1 40.0 - 80.0 %    Immature Granulocytes %, Automated 0.3 0.0 - 0.9 %    Lymphocytes % 18.9 13.0 - 44.0 %    Monocytes % 10.7 2.0 - 10.0 %    Eosinophils % 1.8 0.0 - 6.0 %    Basophils % 0.2 0.0 - 2.0 %    Neutrophils Absolute 5.95 1.20 - 7.70 x10*3/uL    Immature Granulocytes Absolute, Automated 0.03 0.00 - 0.70 x10*3/uL    Lymphocytes Absolute 1.66 1.20 - 4.80 x10*3/uL    Monocytes Absolute 0.94 0.10 - 1.00 x10*3/uL    Eosinophils Absolute 0.16 0.00 - 0.70 x10*3/uL    Basophils Absolute 0.02 0.00 - 0.10 x10*3/uL   Comprehensive Metabolic Panel   Result Value Ref Range    Glucose 96 74 - 99 mg/dL    Sodium 135 (L) 136 - 145 mmol/L    Potassium 4.2 3.5 - 5.3 mmol/L    Chloride 104 98 - 107 mmol/L    Bicarbonate 23 21 - 32 mmol/L    Anion Gap 12 10 - 20 mmol/L    Urea Nitrogen 6 6 - 23 mg/dL    Creatinine 0.62 0.50 - 1.05 mg/dL    eGFR >90 >60 mL/min/1.73m*2    Calcium 8.3 (L) 8.6 - 10.3 mg/dL    Albumin 3.3 (L) 3.4 - 5.0 g/dL    Alkaline Phosphatase 48 33 - 110 U/L    Total Protein 5.4 (L) 6.4 - 8.2 g/dL    AST 9 9 - 39 U/L    Bilirubin, Total 0.3 0.0 - 1.2 mg/dL    ALT 7 7 - 45 U/L   Magnesium   Result Value Ref Range    Magnesium 1.79 1.60 - 2.40 mg/dL       Imaging:  Vascular US upper extremity venous duplex right    Result Date: 8/13/2024  Interpreted By:  Solo Beaver, STUDY: [facility] US UPPER EXTREMITY VENOUS DUPLEX RIGHT;  8/12/2024 2:58 pm   INDICATION: Signs/Symptoms:RUE pain/swelling at PICC site.  Pain and swelling   COMPARISON: None.   ACCESSION NUMBER(S): [id number]   ORDERING CLINICIAN: SERAFIN PERRY   TECHNIQUE: Vascular ultrasound of the  right upper extremity was performed. Evaluation was performed with grayscale, color, and spectral Doppler. When possible, compression views of the evaluated veins was also performed.   FINDINGS: Evaluation of the visualized portions of the internal jugular, subclavian, axillary, brachial, cephalic, and basilic veins was performed.   There is occlusive thrombosis of the visible portions of the subclavian vein as well as the basilic vein.   There is normal respiratory variation, normal compressibility, as well as normal color doppler signal in the remaining visualized vessels.       Positive for occlusive thrombosis of the right subclavian and basilic veins.   MACRO: None.   Signed by: Solo Beaver 8/13/2024 8:13 AM Dictation workstation:   [id number]    Assessment  UGI Bleed  Multiple duodenal ulcers  DVT right upper extremity    No acute events overnight.  Patient states her pain is better today.  On exam, she is in no acute distress, abdomen is soft, tenderness to palpation in the lower abdomen and right upper arm.  Heart rate 107 this morning.  WBC 8.8.  Hemoglobin 10.1.    Plan  -Dr. Escobar will perform EGD today.  -NPO until after EGD, then she can resume full liquid diet.  -Continue IV antibiotics, ID consulted for outpatient antibiotic recommendations- they recommended oral stepdown therapy if she continues to improve  -Continue pantoprazole and sucralfate for duodenal ulcers  -Pain control-PCA pump being managed by Dr. Escobar  -Nausea control  -DVT prophylaxis-SCDs. Recommend starting sub q heparin as Hgb is stable    -Pulmonary toileting   -Encourage ambulation  -Continue care per primary team       Further recommendations per Dr. Luz Reid, PAAsiyaC    Time spent  35  minutes obtaining labs, imaging, recommendations, interview, assessment, examination, medication review/ordering, and EMR review.    Plan of care was discussed extensively with patient. Patient verbalized understanding through teach back method. All questions and concerns addressed upon examination.     Of note, this documentation is completed using the Dragon Dictation system (voice recognition software). There may be spelling and/or grammatical errors that were not corrected prior to final submission.

## 2024-08-14 NOTE — CONSULTS
Infectious Disease    Patient Name: Lisa Montemayor  Date: 8/14/2024  YOB: 1995  Medical Record Number: [id number]        Chief Complaint   Patient presents with    Black or Bloody Stool         History of Present Illness:   Patient presented with abd pain for several days last week.. Epigastric, severe, sharp, worse with eating and drinking. Associated bloody emesis and stool, nausea.  She was detained at a local 's office . CT angio of the abdomen and pelvis reported large clot within the gastric body and fundus measuring up to 6.2 cm. There is no evidence of active bleeding. There is diffuse fold thickening due to gastritis. There is inflamed deep ulceration along the anterior lateral wall of the duodenal bulb with adjacent fat stranding, fluid and reactive gallbladder wall thickening likely representing microperforation of the duodenal ulcer.     Surgery was involved. She was placed on TPN.There was concern for microperforation duodenum with duodenum showing multiple ulcers, possibly secondary to drug use. There was also concern for infection from perforation with previous small fluid areas adjacent, too samll for aspiration given location. Over last couple days, pain is improving, and diet slowly being advanced. No fevers at this time.Right arm pain present and patient with superficial thrombophlebitis  There was some degree of leaking of contrast out of two ulcers last week on CT    Review of Systems: All other ROS reviewed and are negative other than as stated in HPI            Social History     Tobacco Use    Smoking status: Every Day     Current packs/day: 1.00     Average packs/day: 1 pack/day for 18.0 years (18.0 ttl pk-yrs)     Types: Cigarettes     Passive exposure: Current    Smokeless tobacco: Never   Substance Use Topics    Alcohol use: Yes     Alcohol/week: 4.0 standard drinks of alcohol     Types: 4 Shots of liquor per week    Drug use: Not Currently         History  reviewed. No pertinent past medical history.        History reviewed. No pertinent surgical history.        Current Facility-Administered Medications:     alteplase (Cathflo Activase) injection 2 mg, 2 mg, intra-catheter, PRN, Jeet Braga MD    cefTRIAXone (Rocephin) 1 g in dextrose (iso) IV 50 mL, 1 g, intravenous, q24h, Miko Mtz MD, Stopped at 08/13/24 0840    diphenhydrAMINE (BENADryl) injection 25 mg, 25 mg, intravenous, q6h PRN, Lito Shah MD, 25 mg at 08/13/24 1713    fluconazole (Diflucan) 400 mg in sodium chloride (iso)  mL, 400 mg, intravenous, q24h, Amna Riley, [facility]    hydromorphone PCA 0.5 mg/mL in NS opioid tolerant, , intravenous, Continuous, Goran Escobar MD, New Syringe/Cartridge at 08/13/24 1414    LORazepam (Ativan) injection 1 mg, 1 mg, intravenous, q6h PRN, Lito Shah MD, 1 mg at 08/13/24 2357    metroNIDAZOLE (Flagyl) 500 mg in sodium chloride (iso)  mL, 500 mg, intravenous, q8h, Miko Mtz MD, Stopped at 08/13/24 1808    naloxone (Narcan) injection 0.2 mg, 0.2 mg, intravenous, PRN, Goran Escobar MD    OLANZapine (ZyPREXA) tablet 10 mg, 10 mg, oral, Nightly, Joseph Askew, APRN-CNP, 10 mg at 08/13/24 2121    ondansetron (Zofran) tablet 4 mg, 4 mg, oral, q8h PRN **OR** ondansetron (Zofran) injection 4 mg, 4 mg, intravenous, q8h PRN, Miko Mtz MD, 4 mg at 08/10/24 2140    pantoprazole (ProtoNix) injection 40 mg, 40 mg, intravenous, BID, Miko Mtz MD, 40 mg at 08/13/24 2121    sucralfate (Carafate) tablet 1 g, 1 g, oral, q6h LÓPEZ, Jose Perez MD, 1 g at 08/13/24 2356    traZODone (Desyrel) tablet 50 mg, 50 mg, oral, Nightly PRN, Jeet Braga MD, 50 mg at 08/13/24 2121     Allergies   Allergen Reactions    Amoxicillin Hives    Penicillins Hives          No family history on file.      Physical Exam:    Blood pressure 97/56, pulse (!) 112, temperature 36.7 °C (98.1 °F), temperature source Temporal, resp. rate 16, height  "1.6 m (5' 3\"), weight 57.7 kg (127 lb 3.3 oz), SpO2 99%.  General: Patient appears ok at the present time. NAD  Skin: no new rashes  HEENT:  Neck is supple, No subconjunctival hemorrhages, no oral exudates  Heart: S1 S2  Lungs: clear bilaterally  Abdomen: soft,tneder RUQ, epigastric, RLQ  Back :no CVA tenderness  Extrem: No edema, non tender  Neuro exam: CN II-XII intact  Psych: cooperative    Labs:  I have reviewed all lab results by electronic record, including most recent CBC, metabolic panel, and pertinent abnormalities were addressed from an infectious disease perspective.  Trends are being monitored over time.    Lab Results   Component Value Date    WBC 6.9 08/13/2024    HGB 9.5 (L) 08/13/2024    HCT 29.7 (L) 08/13/2024    MCV 88 08/13/2024     08/13/2024     Lab Results   Component Value Date    GLUCOSE 94 08/13/2024    CALCIUM 8.2 (L) 08/13/2024     08/13/2024    K 3.8 08/13/2024    CO2 29 08/13/2024     08/13/2024    BUN 5 (L) 08/13/2024    CREATININE 0.56 08/13/2024       Radiology:  I have reviewed imaging results per electronic record and most pertinent abnormalities are being addressed from an infectious disease standpoint.            ASSESSMENT:  Problem List Items Addressed This Visit          Gastrointestinal and Abdominal    * (Principal) GI bleeding - Primary    Duodenal ulcer    Relevant Orders    Esophagogastroduodenoscopy (EGD)     Other Visit Diagnoses       Pain and swelling of upper extremity, right        Relevant Orders    Vascular US upper extremity venous duplex right (Completed)          PCN allergy      Perforation of duodenum with fluid seen   No clear abscess but likely microorganims present. Has been on IV antibiotics. Most recent CT yesterday showed improvement in fluid. Pt non toxic    PLAN:   Agree with current antibiotics for now. Pt not interested in IV antibiotics as outpatient given in SNF , given drug use (she denies IVDA) home therapy not a good " option.  As no formal abscesses seen and patient improving, oral stepdown therapy may be ok if she continues to improve.     Discussed with surgery

## 2024-08-15 LAB
ANION GAP SERPL CALC-SCNC: 12 MMOL/L (ref 10–20)
BUN SERPL-MCNC: 5 MG/DL (ref 6–23)
CALCIUM SERPL-MCNC: 8.3 MG/DL (ref 8.6–10.3)
CHLORIDE SERPL-SCNC: 105 MMOL/L (ref 98–107)
CO2 SERPL-SCNC: 23 MMOL/L (ref 21–32)
CREAT SERPL-MCNC: 0.58 MG/DL (ref 0.5–1.05)
EGFRCR SERPLBLD CKD-EPI 2021: >90 ML/MIN/1.73M*2
ERYTHROCYTE [DISTWIDTH] IN BLOOD BY AUTOMATED COUNT: 16.1 % (ref 11.5–14.5)
GLUCOSE SERPL-MCNC: 137 MG/DL (ref 74–99)
HCT VFR BLD AUTO: 31.7 % (ref 36–46)
HGB BLD-MCNC: 10.3 G/DL (ref 12–16)
HOLD SPECIMEN: NORMAL
MAGNESIUM SERPL-MCNC: 1.93 MG/DL (ref 1.6–2.4)
MCH RBC QN AUTO: 28.4 PG (ref 26–34)
MCHC RBC AUTO-ENTMCNC: 32.5 G/DL (ref 32–36)
MCV RBC AUTO: 87 FL (ref 80–100)
NRBC BLD-RTO: 0 /100 WBCS (ref 0–0)
PLATELET # BLD AUTO: 260 X10*3/UL (ref 150–450)
POTASSIUM SERPL-SCNC: 3.9 MMOL/L (ref 3.5–5.3)
RBC # BLD AUTO: 3.63 X10*6/UL (ref 4–5.2)
SODIUM SERPL-SCNC: 136 MMOL/L (ref 136–145)
WBC # BLD AUTO: 10.3 X10*3/UL (ref 4.4–11.3)

## 2024-08-15 PROCEDURE — 36415 COLL VENOUS BLD VENIPUNCTURE: CPT | Performed by: HOSPITALIST

## 2024-08-15 PROCEDURE — 85027 COMPLETE CBC AUTOMATED: CPT | Performed by: HOSPITALIST

## 2024-08-15 PROCEDURE — 2500000001 HC RX 250 WO HCPCS SELF ADMINISTERED DRUGS (ALT 637 FOR MEDICARE OP): Performed by: INTERNAL MEDICINE

## 2024-08-15 PROCEDURE — 2500000002 HC RX 250 W HCPCS SELF ADMINISTERED DRUGS (ALT 637 FOR MEDICARE OP, ALT 636 FOR OP/ED): Performed by: REGISTERED NURSE

## 2024-08-15 PROCEDURE — 2500000004 HC RX 250 GENERAL PHARMACY W/ HCPCS (ALT 636 FOR OP/ED): Performed by: STUDENT IN AN ORGANIZED HEALTH CARE EDUCATION/TRAINING PROGRAM

## 2024-08-15 PROCEDURE — 1200000002 HC GENERAL ROOM WITH TELEMETRY DAILY

## 2024-08-15 PROCEDURE — 2500000001 HC RX 250 WO HCPCS SELF ADMINISTERED DRUGS (ALT 637 FOR MEDICARE OP): Performed by: HOSPITALIST

## 2024-08-15 PROCEDURE — 80048 BASIC METABOLIC PNL TOTAL CA: CPT | Performed by: HOSPITALIST

## 2024-08-15 PROCEDURE — 83735 ASSAY OF MAGNESIUM: CPT | Performed by: HOSPITALIST

## 2024-08-15 PROCEDURE — 99232 SBSQ HOSP IP/OBS MODERATE 35: CPT | Performed by: HOSPITALIST

## 2024-08-15 PROCEDURE — 2500000001 HC RX 250 WO HCPCS SELF ADMINISTERED DRUGS (ALT 637 FOR MEDICARE OP): Performed by: STUDENT IN AN ORGANIZED HEALTH CARE EDUCATION/TRAINING PROGRAM

## 2024-08-15 PROCEDURE — 2500000004 HC RX 250 GENERAL PHARMACY W/ HCPCS (ALT 636 FOR OP/ED): Performed by: INTERNAL MEDICINE

## 2024-08-15 PROCEDURE — 99232 SBSQ HOSP IP/OBS MODERATE 35: CPT | Performed by: SURGERY

## 2024-08-15 PROCEDURE — 2500000004 HC RX 250 GENERAL PHARMACY W/ HCPCS (ALT 636 FOR OP/ED): Performed by: HOSPITALIST

## 2024-08-15 RX ORDER — HYDROMORPHONE HYDROCHLORIDE 1 MG/ML
1 INJECTION, SOLUTION INTRAMUSCULAR; INTRAVENOUS; SUBCUTANEOUS EVERY 4 HOURS PRN
Status: DISCONTINUED | OUTPATIENT
Start: 2024-08-15 | End: 2024-08-16 | Stop reason: HOSPADM

## 2024-08-15 RX ORDER — OXYCODONE AND ACETAMINOPHEN 5; 325 MG/1; MG/1
1.5 TABLET ORAL EVERY 4 HOURS PRN
Status: DISCONTINUED | OUTPATIENT
Start: 2024-08-15 | End: 2024-08-16 | Stop reason: HOSPADM

## 2024-08-15 RX ORDER — PANTOPRAZOLE SODIUM 40 MG/1
40 TABLET, DELAYED RELEASE ORAL 2 TIMES DAILY
Status: DISCONTINUED | OUTPATIENT
Start: 2024-08-15 | End: 2024-08-16 | Stop reason: HOSPADM

## 2024-08-15 ASSESSMENT — PAIN - FUNCTIONAL ASSESSMENT
PAIN_FUNCTIONAL_ASSESSMENT: 0-10

## 2024-08-15 ASSESSMENT — COGNITIVE AND FUNCTIONAL STATUS - GENERAL
WALKING IN HOSPITAL ROOM: A LITTLE
MOBILITY SCORE: 22
CLIMB 3 TO 5 STEPS WITH RAILING: A LITTLE
DAILY ACTIVITIY SCORE: 24
MOBILITY SCORE: 24
DAILY ACTIVITIY SCORE: 24

## 2024-08-15 ASSESSMENT — PAIN DESCRIPTION - ORIENTATION
ORIENTATION: RIGHT

## 2024-08-15 ASSESSMENT — PAIN SCALES - GENERAL
PAINLEVEL_OUTOF10: 6
PAINLEVEL_OUTOF10: 9
PAINLEVEL_OUTOF10: 10 - WORST POSSIBLE PAIN
PAINLEVEL_OUTOF10: 0 - NO PAIN
PAINLEVEL_OUTOF10: 0 - NO PAIN
PAINLEVEL_OUTOF10: 8
PAINLEVEL_OUTOF10: 1
PAINLEVEL_OUTOF10: 9
PAINLEVEL_OUTOF10: 0 - NO PAIN
PAINLEVEL_OUTOF10: 0 - NO PAIN
PAINLEVEL_OUTOF10: 3
PAINLEVEL_OUTOF10: 0 - NO PAIN
PAINLEVEL_OUTOF10: 10 - WORST POSSIBLE PAIN
PAINLEVEL_OUTOF10: 10 - WORST POSSIBLE PAIN

## 2024-08-15 ASSESSMENT — PAIN DESCRIPTION - LOCATION
LOCATION: ARM

## 2024-08-15 NOTE — PROGRESS NOTES
"  Infectious Disease    Patient Name: Lisa Montemayor  Date: 8/15/2024  YOB: 1995  Medical Record Number: [id number]        Chief Complaint   Patient presents with    Black or Bloody Stool         History of Present Illness:   Patient presented with abd pain for several days last week.. Epigastric, severe, sharp, worse with eating and drinking. Associated bloody emesis and stool, nausea.  She was detained at a local 's office . CT angio of the abdomen and pelvis reported large clot within the gastric body and fundus measuring up to 6.2 cm. There is no evidence of active bleeding. There is diffuse fold thickening due to gastritis. There is inflamed deep ulceration along the anterior lateral wall of the duodenal bulb with adjacent fat stranding, fluid and reactive gallbladder wall thickening likely representing microperforation of the duodenal ulcer.     Surgery was involved. She was placed on TPN.There was concern for microperforation duodenum with duodenum showing multiple ulcers, possibly secondary to drug use. There was also concern for infection from perforation with previous small fluid areas adjacent, too samll for aspiration given location. Over last couple days, pain is improving, and diet slowly being advanced. No fevers at this time.Right arm pain present and patient with l thrombophlebitis  There was some degree of leaking of contrast out of two ulcers last week on CT      Had EGD performed. Had ever last evening    Review of Systems: All other ROS reviewed and are negative other than as stated in HPI        Physical Exam:    Blood pressure 122/77, pulse (!) 127, temperature 36.6 °C (97.9 °F), temperature source Temporal, resp. rate 16, height 1.6 m (5' 3\"), weight 57.7 kg (127 lb 3.3 oz), SpO2 99%.  General: Patient appears ok at the present time. NAD  Skin: no new rashes  HEENT:  Neck is supple, No subconjunctival hemorrhages, no oral exudates  Heart: S1 S2  Lungs: clear " bilaterally  Abdomen: soft,tneder RUQ, epigastric, RLQ  Back :no CVA tenderness  Extrem: No edema, non tender  Neuro exam: CN II-XII intact  Psych: cooperative    Labs:  I have reviewed all lab results by electronic record, including most recent CBC, metabolic panel, and pertinent abnormalities were addressed from an infectious disease perspective.  Trends are being monitored over time.    Lab Results   Component Value Date    WBC 10.3 08/15/2024    HGB 10.3 (L) 08/15/2024    HCT 31.7 (L) 08/15/2024    MCV 87 08/15/2024     08/15/2024     Lab Results   Component Value Date    GLUCOSE 137 (H) 08/15/2024    CALCIUM 8.3 (L) 08/15/2024     08/15/2024    K 3.9 08/15/2024    CO2 23 08/15/2024     08/15/2024    BUN 5 (L) 08/15/2024    CREATININE 0.58 08/15/2024       Radiology:  I have reviewed imaging results per electronic record and most pertinent abnormalities are being addressed from an infectious disease standpoint.            ASSESSMENT:  Problem List Items Addressed This Visit          Gastrointestinal and Abdominal    * (Principal) GI bleeding - Primary    Relevant Orders    Surgical Pathology Exam    Esophagogastroduodenoscopy (EGD)    Duodenal ulcer    Relevant Orders    Esophagogastroduodenoscopy (EGD) (Completed)    Surgical Pathology Exam    Esophagogastroduodenoscopy (EGD)     Other Visit Diagnoses       Pain and swelling of upper extremity, right        Relevant Orders    Vascular US upper extremity venous duplex right (Completed)    Surgical Pathology Exam          PCN allergy      Perforation of duodenum with fluid seen   No clear abscess but likely microorganims present. Has been on IV antibiotics. Most recent CT showed improvement in fluid. Pt non toxic    PLAN:   Agree with current antibiotics for now. Pt not interested in IV antibiotics as outpatient given in SNF , given drug use (she denies IVDA) home therapy not a good option.  As no formal abscesses seen and patient improving,   initial oral stepdown therapy may be ok if she continues to improve.     With fever , follow closely, could be VTE related . We do have to monitor for absorption issues as well

## 2024-08-15 NOTE — PROGRESS NOTES
"Lisa Montemayor is a 28 y.o. female on day 9 of admission presenting with GI bleeding.    Subjective   No acute issues overnight, tolerating full liquids, still having pain in R arm and RUQ       Objective     Physical Exam  Gen: NAD, Aax3  Abd; Soft, ND, TTP in RUQ  Last Recorded Vitals  Blood pressure 115/61, pulse (!) 121, temperature 36.7 °C (98.1 °F), temperature source Temporal, resp. rate 16, height 1.6 m (5' 3\"), weight 57.7 kg (127 lb 3.3 oz), SpO2 97%.  Intake/Output last 3 Shifts:  I/O last 3 completed shifts:  In: 1150 (19.9 mL/kg) [I.V.:300 (5.2 mL/kg); IV Piggyback:850]  Out: - (0 mL/kg)   Weight: 57.7 kg     Relevant Results                     Component      Latest Ref Rng 8/14/2024   LEUKOCYTES (10*3/UL) IN BLOOD BY AUTOMATED COUNT, [place]      4.4 - 11.3 x10*3/uL 16.0 (H)    nRBC      0.0 - 0.0 /100 WBCs 0.0    ERYTHROCYTES (10*6/UL) IN BLOOD BY AUTOMATED COUNT, [place]      4.50 - 5.90 x10*6/uL 2.65 (L)    HEMOGLOBIN      13.5 - 17.5 g/dL 8.3 (L)    HEMATOCRIT      41.0 - 52.0 % 25.8 (L)    MCV      80 - 100 fL 97    MCH      26.0 - 34.0 pg 31.3    MCHC      32.0 - 36.0 g/dL 32.2    RED CELL DISTRIBUTION WIDTH      11.5 - 14.5 % 11.9    PLATELETS (10*3/UL) IN BLOOD AUTOMATED COUNT, Pakistani      150 - 450 x10*3/uL 200    GLUCOSE      74 - 99 mg/dL 367 (H)    SODIUM      136 - 145 mmol/L 134 (L)    POTASSIUM      3.5 - 5.3 mmol/L 5.3    CHLORIDE      98 - 107 mmol/L 100    Bicarbonate      21 - 32 mmol/L 25    Anion Gap      10 - 20 mmol/L 14    Blood Urea Nitrogen      6 - 23 mg/dL 43 (H)    Creatinine      0.50 - 1.30 mg/dL 5.25 (H)    EGFR      >60 mL/min/1.73m*2 12 (L)    Calcium      8.6 - 10.3 mg/dL 7.9 (L)    Protime      9.8 - 12.8 seconds 15.1 (H)    INR      0.9 - 1.1  1.3 (H)    POCT Glucose      74 - 99 mg/dL 244 (H)    POCT Glucose       274 (H)    POCT Glucose       372 (H)    POCT Glucose       365 (H)      Component      Latest Ref Rng 8/15/2024   LEUKOCYTES (10*3/UL) IN BLOOD " BY AUTOMATED COUNT, [place]      4.4 - 11.3 x10*3/uL 10.3    nRBC      0.0 - 0.0 /100 WBCs 0.0    ERYTHROCYTES (10*6/UL) IN BLOOD BY AUTOMATED COUNT, [place]      4.50 - 5.90 x10*6/uL 2.53 (L)    HEMOGLOBIN      13.5 - 17.5 g/dL 7.9 (L)    HEMATOCRIT      41.0 - 52.0 % 24.5 (L)    MCV      80 - 100 fL 97    MCH      26.0 - 34.0 pg 31.2    MCHC      32.0 - 36.0 g/dL 32.2    RED CELL DISTRIBUTION WIDTH      11.5 - 14.5 % 11.8    PLATELETS (10*3/UL) IN BLOOD AUTOMATED COUNT, [place]      150 - 450 x10*3/uL 189    GLUCOSE      74 - 99 mg/dL 249 (H)    SODIUM      136 - 145 mmol/L 136    POTASSIUM      3.5 - 5.3 mmol/L 4.7    CHLORIDE      98 - 107 mmol/L 102    Bicarbonate      21 - 32 mmol/L 24    Anion Gap      10 - 20 mmol/L 15    Blood Urea Nitrogen      6 - 23 mg/dL 49 (H)    Creatinine      0.50 - 1.30 mg/dL 5.21 (H)    EGFR      >60 mL/min/1.73m*2 12 (L)    Calcium      8.6 - 10.3 mg/dL 7.8 (L)    Protime      9.8 - 12.8 seconds    INR      0.9 - 1.1     POCT Glucose      74 - 99 mg/dL 349 (H)    POCT Glucose       423 (H)    POCT Glucose       471 (H)    POCT Glucose       461 (H)    POCT Glucose       420 (H)    POCT Glucose       428 (H)    POCT Glucose       292 (H)       Legend:  (H) High  (L) Low         Assessment/Plan   Assessment & Plan  GI bleeding    Duodenal ulcer    Perforated ulcer of intestine (Multi)    Acute blood loss anemia    Acute gastritis with hemorrhage    27 y/o female with PUD c/b multiple contained perforations which appear to have healed and bleeding which has resolved, s/p EGD yesterday demonstrating concern for concern for GOO from ulcers.  Patient is tolerating full liquids and should remain on it indefinitely until follow up.    -Recommend full liquid diet, to remain until follow up  -BID PPI and carafate indefinitely  -FU biopsies for H. Pylori, to discuss with GI about empiric H. Pylori treatment vs. Holding off until results are back  -Abx for contained  perforation  -Recommend liquid meds if possible  -Cannot see for definitive treatment as outpatient, therefore should see someone at [facility].  I recommend Andrea Doyle, phone number is [phone]  -[facility]. Care per primary team, will follow for now    Goran Escobar MD  08/15/24  6:45 PM         I spent 46 minutes in the professional and overall care of this patient.      Goran Escobar MD  08/15/24  6:45 PM

## 2024-08-15 NOTE — PROGRESS NOTES
PCA was discontinued last night and pt started on PO pain management. EGD was completed and biopsies taken. Positive for DVT, no plan for anticoagulation d/t GI issues. Pt given recommendation to set up appointment with [facility], Dr Lance Macedo, d/t pt's insurance only accepted by [facility]. Contact and address given.

## 2024-08-15 NOTE — CARE PLAN
The patient's goals for the shift include pain at  a tolerable level    The clinical goals for the shift include labs wnl    Over the shift, the patient did not make progress toward the following goals: none. Barriers to progression include n/a. Recommendations to address these barriers include continue current plan of care.

## 2024-08-15 NOTE — CARE PLAN
The patient's goals for the shift include no pain    The clinical goals for the shift include pain control      Problem: Skin  Goal: Prevent/manage excess moisture  Outcome: Progressing  Flowsheets (Taken 8/14/2024 2344)  Prevent/manage excess moisture: Monitor for/manage infection if present  Goal: Prevent/minimize sheer/friction injuries  Outcome: Progressing  Flowsheets (Taken 8/14/2024 2344)  Prevent/minimize sheer/friction injuries: Increase activity/out of bed for meals     Problem: Pain - Adult  Goal: Verbalizes/displays adequate comfort level or baseline comfort level  Outcome: Progressing     Problem: Safety - Adult  Goal: Free from fall injury  Outcome: Progressing     Problem: Discharge Planning  Goal: Discharge to home or other facility with appropriate resources  Outcome: Progressing     Problem: Fall/Injury  Goal: Not fall by end of shift  Outcome: Progressing  Goal: Be free from injury by end of the shift  Outcome: Progressing  Goal: Verbalize understanding of personal risk factors for fall in the hospital  Outcome: Progressing  Goal: Verbalize understanding of risk factor reduction measures to prevent injury from fall in the home  Outcome: Progressing  Goal: Use assistive devices by end of the shift  Outcome: Progressing  Goal: Pace activities to prevent fatigue by end of the shift  Outcome: Progressing     Problem: Pain  Goal: Takes deep breaths with improved pain control throughout the shift  Outcome: Progressing  Goal: Turns in bed with improved pain control throughout the shift  Outcome: Progressing  Goal: Walks with improved pain control throughout the shift  Outcome: Progressing  Goal: Performs ADL's with improved pain control throughout shift  Outcome: Progressing  Goal: Participates in PT with improved pain control throughout the shift  Outcome: Progressing  Goal: Free from opioid side effects throughout the shift  Outcome: Progressing  Goal: Free from acute confusion related to pain meds  throughout the shift  Outcome: Progressing

## 2024-08-15 NOTE — PROGRESS NOTES
"PROGRESS NOTE    ASSESSMENT AND PLAN:       Multiple contained perforated duodenal ulcers  - CT abdomen/pelvis with IV contrast from 8/6 reviewed  - Repeat CT abdomen/pelvis with IV and oral contrast on 8/12 showed no acute findings.  - EGD showed large duodenal ulcer with clean base.  There was also a stricture in the duodenum.  Biopsies were done.     Plan:  - Continuing Protonix 40 mg IV twice daily  -Transitioned off Dilaudid PCA pump, still continues to have pain, increase Dilaudid 1 mg every 4 hours for severe pain, Percocet 7.5 every 4 hours for moderate pain  -Tolerating full liquids, discontinued PPN due to upper extremity thrombosis  - Continue intra-abdominal prophylaxis with fluconazole, ceftriaxone, metronidazole    SIRS (systemic inflammatory response syndrome)  -Patient was noted to have a fever overnight, and remains tachycardic.  -Continue intra-abdominal prophylaxis, plan to touch base with ID.    Upper GI bleed  Acute blood loss anemia  - Patient denied regular aspirin/NSAID use  - Was having hematemesis and rectal bleeding for few days prior to admission, CT abdomen/pelvis with large clot seen as well  - Hemoglobin 10.3 today (6.3 on admission, status post 2 units PBC transfusion)  - Continue Protonix as per above        Right subclavian basilic vein DVT  -This is most likely related to PICC line.  -Given her history of GI bleed, discontinued PICC line.  -Advised warm compression.  -Spoke with Nori, plan to observe for now     Disposition: Plan to discharge once pain is controlled.      SUBJECTIVE:   Admit Date: 8/5/2024    Interval History: Continues to complain of pain.      OBJECTIVE:   Vitals: /77 (BP Location: Left arm, Patient Position: Lying)   Pulse (!) 127   Temp 36.6 °C (97.9 °F) (Temporal)   Resp 16   Ht 1.6 m (5' 3\")   Wt 57.7 kg (127 lb 3.3 oz)   SpO2 99%   BMI 22.53 kg/m²    Wt Readings from Last 3 Encounters:   08/06/24 57.7 kg (127 lb 3.3 oz)      24HR INTAKE/OUTPUT:  " "  Intake/Output Summary (Last 24 hours) at 8/15/2024 1201  Last data filed at 8/15/2024 0902  Gross per 24 hour   Intake 850 ml   Output --   Net 850 ml       PHYSICAL EXAM:   GENERAL: Laying in bed, does not appear to be in any distress.   HEENT: HEAD: Normocephalic atraumatic.  Neck: Supple.  Eyes: Pupils are reactive to direct light.   CVS: S1, S2 heard. Regular rate and rhythm  LUNGS: Clear to auscultate bilaterally. No wheezing or rhonchi appreciated.  ABDOMEN: Soft, tenderness to palpate.  NEUROLOGICAL: No focal neurological deficits appreciated. Cranial nerves are grossly intact.  EXTREMITIES: No edema appreciated.  SKIN:  Grossly intact, warm and dry.      LABS/IMAGING AND MEDICATIONS:   Scheduled Meds:cefTRIAXone, 1 g, intravenous, q24h  fluconazole, 400 mg, oral, Daily  metroNIDAZOLE, 500 mg, oral, q8h LÓPEZ  OLANZapine, 10 mg, oral, Nightly  pantoprazole, 40 mg, intravenous, BID  sucralfate, 1 g, oral, q6h LÓPEZ      PRN Meds:PRN medications: acetaminophen **OR** acetaminophen, alteplase, diphenhydrAMINE, HYDROmorphone, hydrOXYzine HCL, naloxone, ondansetron **OR** ondansetron, oxyCODONE-acetaminophen, traZODone    No lab exists for component: \"CBC\"   No lab exists for component: \"CMP\"   No lab exists for component: \"TROPONIN\"          No lab exists for component: \"LIPIDS\"       No lab exists for component: \"URINALYSIS\"          BMP:  Results from last 7 days   Lab Units 08/15/24  0935 08/14/24  0634 08/13/24  0613   SODIUM mmol/L 136 135* 138   POTASSIUM mmol/L 3.9 4.2 3.8   CHLORIDE mmol/L 105 104 105   CO2 mmol/L 23 23 29   BUN mg/dL 5* 6 5*   CREATININE mg/dL 0.58 0.62 0.56       CBC:  Results from last 7 days   Lab Units 08/15/24  0935 08/14/24  0634 08/13/24  0613   WBC AUTO x10*3/uL 10.3 8.8 6.9   RBC AUTO x10*6/uL 3.63* 3.61* 3.37*   HEMOGLOBIN g/dL 10.3* 10.1* 9.5*   HEMATOCRIT % 31.7* 31.6* 29.7*   MCV fL 87 88 88   MCH pg 28.4 28.0 28.2   MCHC g/dL 32.5 32.0 32.0   RDW % 16.1* 16.1* 17.0* " "  PLATELETS AUTO x10*3/uL 260 162 199       Cardiac Enzymes:           Hepatic Function Panel:  Results from last 7 days   Lab Units 08/14/24  0634 08/13/24  0613 08/12/24  0513   ALK PHOS U/L 48 38 39   ALT U/L 7 7 9   AST U/L 9 7* 8*   PROTEIN TOTAL g/dL 5.4* 5.1* 5.3*   BILIRUBIN TOTAL mg/dL 0.3 0.3 0.3       Magnesium:  Results from last 7 days   Lab Units 08/15/24  0935   MAGNESIUM mg/dL 1.93       Pro-BNP:  No results found for: \"PROBNP\"    INR:        TSH:  No results found for: \"TSH\"    Lipid Profile:        No lab exists for component: \"LABVLDL\"    HgbA1C:        Lactate Level:  No lab exists for component: \"LACTA\"    CMP:  Results from last 7 days   Lab Units 08/15/24  0935 08/14/24  0634 08/13/24  0613 08/12/24  0513   SODIUM mmol/L 136 135* 138 137   POTASSIUM mmol/L 3.9 4.2 3.8 3.7   CHLORIDE mmol/L 105 104 105 106   CO2 mmol/L 23 23 29 27   BUN mg/dL 5* 6 5* 5*   CREATININE mg/dL 0.58 0.62 0.56 0.63   GLUCOSE mg/dL 137* 96 94 113*   CALCIUM mg/dL 8.3* 8.3* 8.2* 8.4*   PROTEIN TOTAL g/dL  --  5.4* 5.1* 5.3*   BILIRUBIN TOTAL mg/dL  --  0.3 0.3 0.3   ALK PHOS U/L  --  48 38 39   AST U/L  --  9 7* 8*   ALT U/L  --  7 7 9       Amylase:        Lipase:        ABG:        No lab exists for component: \"PO2\", \"PCO2\", \"HCO3\", \"BE\", \"O2SAT\"       "

## 2024-08-16 VITALS
TEMPERATURE: 97.7 F | OXYGEN SATURATION: 100 % | WEIGHT: 127.21 LBS | HEIGHT: 63 IN | RESPIRATION RATE: 18 BRPM | HEART RATE: 115 BPM | DIASTOLIC BLOOD PRESSURE: 58 MMHG | SYSTOLIC BLOOD PRESSURE: 105 MMHG | BODY MASS INDEX: 22.54 KG/M2

## 2024-08-16 LAB
ANION GAP SERPL CALC-SCNC: 11 MMOL/L (ref 10–20)
BUN SERPL-MCNC: 6 MG/DL (ref 6–23)
CALCIUM SERPL-MCNC: 8.2 MG/DL (ref 8.6–10.3)
CHLORIDE SERPL-SCNC: 107 MMOL/L (ref 98–107)
CO2 SERPL-SCNC: 23 MMOL/L (ref 21–32)
CREAT SERPL-MCNC: 0.59 MG/DL (ref 0.5–1.05)
EGFRCR SERPLBLD CKD-EPI 2021: >90 ML/MIN/1.73M*2
ERYTHROCYTE [DISTWIDTH] IN BLOOD BY AUTOMATED COUNT: 16.5 % (ref 11.5–14.5)
GLUCOSE SERPL-MCNC: 89 MG/DL (ref 74–99)
HCT VFR BLD AUTO: 30.1 % (ref 36–46)
HGB BLD-MCNC: 9.8 G/DL (ref 12–16)
MAGNESIUM SERPL-MCNC: 1.8 MG/DL (ref 1.6–2.4)
MCH RBC QN AUTO: 28.4 PG (ref 26–34)
MCHC RBC AUTO-ENTMCNC: 32.6 G/DL (ref 32–36)
MCV RBC AUTO: 87 FL (ref 80–100)
NRBC BLD-RTO: 0 /100 WBCS (ref 0–0)
PLATELET # BLD AUTO: 277 X10*3/UL (ref 150–450)
POTASSIUM SERPL-SCNC: 3.5 MMOL/L (ref 3.5–5.3)
RBC # BLD AUTO: 3.45 X10*6/UL (ref 4–5.2)
SODIUM SERPL-SCNC: 137 MMOL/L (ref 136–145)
WBC # BLD AUTO: 8.2 X10*3/UL (ref 4.4–11.3)

## 2024-08-16 PROCEDURE — 83735 ASSAY OF MAGNESIUM: CPT | Performed by: HOSPITALIST

## 2024-08-16 PROCEDURE — 99239 HOSP IP/OBS DSCHRG MGMT >30: CPT | Performed by: HOSPITALIST

## 2024-08-16 PROCEDURE — 2500000001 HC RX 250 WO HCPCS SELF ADMINISTERED DRUGS (ALT 637 FOR MEDICARE OP): Performed by: HOSPITALIST

## 2024-08-16 PROCEDURE — 2500000001 HC RX 250 WO HCPCS SELF ADMINISTERED DRUGS (ALT 637 FOR MEDICARE OP): Performed by: STUDENT IN AN ORGANIZED HEALTH CARE EDUCATION/TRAINING PROGRAM

## 2024-08-16 PROCEDURE — 80048 BASIC METABOLIC PNL TOTAL CA: CPT | Performed by: HOSPITALIST

## 2024-08-16 PROCEDURE — 36415 COLL VENOUS BLD VENIPUNCTURE: CPT | Performed by: HOSPITALIST

## 2024-08-16 PROCEDURE — 85027 COMPLETE CBC AUTOMATED: CPT | Performed by: HOSPITALIST

## 2024-08-16 PROCEDURE — 2500000004 HC RX 250 GENERAL PHARMACY W/ HCPCS (ALT 636 FOR OP/ED): Performed by: HOSPITALIST

## 2024-08-16 PROCEDURE — 2500000004 HC RX 250 GENERAL PHARMACY W/ HCPCS (ALT 636 FOR OP/ED): Performed by: INTERNAL MEDICINE

## 2024-08-16 PROCEDURE — 99231 SBSQ HOSP IP/OBS SF/LOW 25: CPT | Performed by: SURGERY

## 2024-08-16 RX ORDER — FLUCONAZOLE 200 MG/1
400 TABLET ORAL DAILY
Qty: 28 TABLET | Refills: 0 | Status: SHIPPED | OUTPATIENT
Start: 2024-08-16 | End: 2024-08-30

## 2024-08-16 RX ORDER — OXYCODONE AND ACETAMINOPHEN 5; 325 MG/1; MG/1
1 TABLET ORAL EVERY 6 HOURS PRN
Qty: 8 TABLET | Refills: 0 | Status: SHIPPED | OUTPATIENT
Start: 2024-08-16 | End: 2024-08-16

## 2024-08-16 RX ORDER — SUCRALFATE 1 G/1
1 TABLET ORAL EVERY 6 HOURS SCHEDULED
Qty: 120 TABLET | Refills: 0 | Status: SHIPPED | OUTPATIENT
Start: 2024-08-16 | End: 2024-09-15

## 2024-08-16 RX ORDER — NALOXONE HYDROCHLORIDE 4 MG/.1ML
1 SPRAY NASAL AS NEEDED
Qty: 2 EACH | Refills: 0 | Status: SHIPPED | OUTPATIENT
Start: 2024-08-16

## 2024-08-16 RX ORDER — HYDROMORPHONE HYDROCHLORIDE 1 MG/ML
1 INJECTION, SOLUTION INTRAMUSCULAR; INTRAVENOUS; SUBCUTANEOUS ONCE
Status: COMPLETED | OUTPATIENT
Start: 2024-08-16 | End: 2024-08-16

## 2024-08-16 RX ORDER — CEFUROXIME AXETIL 500 MG/1
500 TABLET ORAL 2 TIMES DAILY
Qty: 42 TABLET | Refills: 0 | Status: SHIPPED | OUTPATIENT
Start: 2024-08-16 | End: 2024-08-16

## 2024-08-16 RX ORDER — OLANZAPINE 10 MG/1
10 TABLET ORAL NIGHTLY
Qty: 30 TABLET | Refills: 0 | Status: SHIPPED | OUTPATIENT
Start: 2024-08-16 | End: 2024-09-15

## 2024-08-16 RX ORDER — SUCRALFATE 1 G/1
1 TABLET ORAL EVERY 6 HOURS SCHEDULED
Qty: 120 TABLET | Refills: 0 | Status: SHIPPED | OUTPATIENT
Start: 2024-08-16 | End: 2024-08-16

## 2024-08-16 RX ORDER — FLUCONAZOLE 200 MG/1
400 TABLET ORAL DAILY
Qty: 42 TABLET | Refills: 0 | Status: SHIPPED | OUTPATIENT
Start: 2024-08-16 | End: 2024-08-16

## 2024-08-16 RX ORDER — OXYCODONE AND ACETAMINOPHEN 5; 325 MG/1; MG/1
1 TABLET ORAL EVERY 6 HOURS PRN
Qty: 8 TABLET | Refills: 0 | Status: SHIPPED | OUTPATIENT
Start: 2024-08-16

## 2024-08-16 RX ORDER — OLANZAPINE 10 MG/1
10 TABLET ORAL NIGHTLY
Qty: 30 TABLET | Refills: 0 | Status: SHIPPED | OUTPATIENT
Start: 2024-08-16 | End: 2024-08-16

## 2024-08-16 RX ORDER — PANTOPRAZOLE SODIUM 40 MG/1
40 TABLET, DELAYED RELEASE ORAL 2 TIMES DAILY
Qty: 60 TABLET | Refills: 0 | Status: SHIPPED | OUTPATIENT
Start: 2024-08-16 | End: 2024-09-15

## 2024-08-16 RX ORDER — NALOXONE HYDROCHLORIDE 4 MG/.1ML
1 SPRAY NASAL AS NEEDED
Qty: 2 EACH | Refills: 0 | Status: SHIPPED | OUTPATIENT
Start: 2024-08-16 | End: 2024-08-16

## 2024-08-16 RX ORDER — CEFUROXIME AXETIL 500 MG/1
500 TABLET ORAL 2 TIMES DAILY
Qty: 28 TABLET | Refills: 0 | Status: SHIPPED | OUTPATIENT
Start: 2024-08-16 | End: 2024-08-30

## 2024-08-16 RX ORDER — PANTOPRAZOLE SODIUM 40 MG/1
40 TABLET, DELAYED RELEASE ORAL 2 TIMES DAILY
Qty: 60 TABLET | Refills: 0 | Status: SHIPPED | OUTPATIENT
Start: 2024-08-16 | End: 2024-08-16

## 2024-08-16 ASSESSMENT — PAIN - FUNCTIONAL ASSESSMENT
PAIN_FUNCTIONAL_ASSESSMENT: 0-10

## 2024-08-16 ASSESSMENT — COGNITIVE AND FUNCTIONAL STATUS - GENERAL
MOBILITY SCORE: 21
DAILY ACTIVITIY SCORE: 24
CLIMB 3 TO 5 STEPS WITH RAILING: A LITTLE
WALKING IN HOSPITAL ROOM: A LITTLE
MOVING TO AND FROM BED TO CHAIR: A LITTLE

## 2024-08-16 ASSESSMENT — PAIN SCALES - GENERAL
PAINLEVEL_OUTOF10: 6
PAINLEVEL_OUTOF10: 10 - WORST POSSIBLE PAIN
PAINLEVEL_OUTOF10: 2
PAINLEVEL_OUTOF10: 0 - NO PAIN
PAINLEVEL_OUTOF10: 8
PAINLEVEL_OUTOF10: 8
PAINLEVEL_OUTOF10: 6
PAINLEVEL_OUTOF10: 2
PAINLEVEL_OUTOF10: 8
PAINLEVEL_OUTOF10: 8
PAINLEVEL_OUTOF10: 0 - NO PAIN
PAINLEVEL_OUTOF10: 8

## 2024-08-16 ASSESSMENT — PAIN DESCRIPTION - LOCATION
LOCATION: ABDOMEN
LOCATION: ARM

## 2024-08-16 ASSESSMENT — PAIN SCALES - PAIN ASSESSMENT IN ADVANCED DEMENTIA (PAINAD): TOTALSCORE: MEDICATION (SEE MAR)

## 2024-08-16 ASSESSMENT — PAIN DESCRIPTION - ORIENTATION
ORIENTATION: RIGHT

## 2024-08-16 NOTE — CARE PLAN
The patient's goals for the shift include pain at  a tolerable level    The clinical goals for the shift include pain control      Problem: Skin  Goal: Prevent/manage excess moisture  Outcome: Progressing  Flowsheets (Taken 8/16/2024 0313)  Prevent/manage excess moisture: Moisturize dry skin  Goal: Prevent/minimize sheer/friction injuries  Outcome: Progressing  Flowsheets (Taken 8/16/2024 0313)  Prevent/minimize sheer/friction injuries: Increase activity/out of bed for meals     Problem: Pain - Adult  Goal: Verbalizes/displays adequate comfort level or baseline comfort level  Outcome: Progressing     Problem: Safety - Adult  Goal: Free from fall injury  Outcome: Progressing     Problem: Discharge Planning  Goal: Discharge to home or other facility with appropriate resources  Outcome: Progressing     Problem: Fall/Injury  Goal: Not fall by end of shift  Outcome: Progressing  Goal: Be free from injury by end of the shift  Outcome: Progressing  Goal: Verbalize understanding of personal risk factors for fall in the hospital  Outcome: Progressing  Goal: Verbalize understanding of risk factor reduction measures to prevent injury from fall in the home  Outcome: Progressing  Goal: Use assistive devices by end of the shift  Outcome: Progressing  Goal: Pace activities to prevent fatigue by end of the shift  Outcome: Progressing     Problem: Pain  Goal: Takes deep breaths with improved pain control throughout the shift  Outcome: Progressing  Goal: Turns in bed with improved pain control throughout the shift  Outcome: Progressing  Goal: Walks with improved pain control throughout the shift  Outcome: Progressing  Goal: Performs ADL's with improved pain control throughout shift  Outcome: Progressing  Goal: Participates in PT with improved pain control throughout the shift  Outcome: Progressing  Goal: Free from opioid side effects throughout the shift  Outcome: Progressing  Goal: Free from acute confusion related to pain meds  throughout the shift  Outcome: Progressing

## 2024-08-16 NOTE — NURSING NOTE
Belongings gathered up and bagged. Discharge paper work reviewed with patient and she verbalized understanding. Law enforcement here to transport patient back to facility.

## 2024-08-16 NOTE — PROGRESS NOTES
8/16  Police/ services  notified and here, as request upon notification if patient is medical discharge.

## 2024-08-16 NOTE — DISCHARGE SUMMARY
DISCHARGE DIAGNOSIS     Multiple contained perforated duodenal ulcer  Duodenal stricture  Systemic inflammatory response syndrome, improved  Upper GI bleed  Acute blood loss anemia  Right subclavian and basilic vein DVT    HOSPITAL COURSE AND DETAILS     Ms. Montemayor is a 28 year old who presented to the emergency room with hematemesis and bleeding per rectum.  She had a CT abdomen and pelvis which showed large clot burden in the gastric body.  There is no evidence of active bleeding.    On admission, her hemoglobin was 6.3, she was transfused 2 units of PRBC.    He was seen by GI and surgery, initially declined for endoscopy due to perforated ulcer.  Started on Protonix twice daily.  Started on fluconazole, ceftriaxone and Flagyl for intra-abdominal prophylaxis.    She continued to have significant pain, eventually pain medications were escalated to a Dilaudid PCA pump.  She was reevaluated by surgery given that patient did not have any risk factors for a duodenal ulcer, she had an endoscopy which showed a healing duodenal ulcer, there was also a stricture noted.  Surgery advised outpatient follow-up with GI for possible dilation of duodenal stricture.  Biopsies were done.    She continued to have significant pain control and eventually weaned from a Dilaudid PCA pump to IV Dilaudid, she was discharged on Percocet as needed.  She was advised strongly as this is a narcotic medication and can cause sedation as well as lethargy.  She was prescribed intranasal Narcan.    She was seen by infectious disease who recommended Ceftin and fluconazole for 2 weeks.    She did have a PICC line temporarily for TPN as patient was not eating.  She complained of right arm pain, she was found to have a right subclavian and basilic vein DVT.  Hematology was consulted, given that patient had a perforated ulcer, not a candidate for anticoagulation.  PICC line was discontinued.  Both surgery and heme advised observation, she was advised  to repeat an ultrasound in 1 month.    She was given her prescriptions in paper.  She was also given a requisition for a repeat CT abdomen and pelvis with oral and IV contrast in 2 weeks.    Total time spent on discharge planning and coordination of care 40 minutes.  Discharge planning was discussed with patient, she understands and agrees with plan of care.      * Some of these notes were completed using Dragon voice recognition technology and may include unintended areas with respect to translation of word, typographical errors or primary areas which may not have been identified prior to finalization of the document.  DISCHARGE PHYSICAL EXAM     Last Recorded Vitals:  Vitals:    08/16/24 0348 08/16/24 0803 08/16/24 0858 08/16/24 1200   BP: 95/52 101/58 100/62 105/58   BP Location: Left arm Left arm Left arm Left arm   Patient Position: Lying Lying Lying Lying   Pulse: 89 102 (!) 111 (!) 115   Resp: 16 18 18 18   Temp: 35.7 °C (96.3 °F) 36.2 °C (97.2 °F) 36.4 °C (97.5 °F) 36.5 °C (97.7 °F)   TempSrc: Temporal Temporal Temporal Temporal   SpO2: 98% 98% 98% 100%   Weight:       Height:           Physical Exam  PHYSICAL EXAM:   GENERAL: Laying in bed, does not appear to be in any distress.   HEENT: HEAD: Normocephalic atraumatic.  Neck: Supple.  Eyes: Pupils are reactive to direct light.   CVS: S1, S2 heard. Regular rate and rhythm  LUNGS: Clear to auscultate bilaterally. No wheezing or rhonchi appreciated.  ABDOMEN: Soft, mild tenderness to palpate.  NEUROLOGICAL: No focal neurological deficits appreciated. Cranial nerves are grossly intact.  EXTREMITIES: No edema appreciated.  SKIN:  Grossly intact, warm and dry.    DISCHARGE MEDICATIONS        Your medication list        START taking these medications        Instructions Last Dose Given Next Dose Due   cefuroxime 500 mg tablet  Commonly known as: Ceftin      Take 1 tablet (500 mg) by mouth 2 times a day for 14 days.       fluconazole 200 mg tablet  Commonly known  as: Diflucan      Take 2 tablets (400 mg) by mouth once daily for 14 days.       lactobacillus acidophilus & bulgar 1 million cell chewable tablet  Commonly known as: Lactinex      Chew 1 tablet once daily.       naloxone 4 mg/0.1 mL nasal spray  Commonly known as: Narcan      Administer 1 spray (4 mg) into affected nostril(s) if needed for opioid reversal. May repeat every 2-3 minutes if needed, alternating nostrils, until medical assistance becomes available.       OLANZapine 10 mg tablet  Commonly known as: ZyPREXA      Take 1 tablet (10 mg) by mouth once daily at bedtime.       oxyCODONE-acetaminophen 5-325 mg tablet  Commonly known as: Percocet      Take 1 tablet by mouth every 6 hours if needed for moderate pain (4 - 6) or severe pain (7 - 10).       pantoprazole 40 mg EC tablet  Commonly known as: ProtoNix      Take 1 tablet (40 mg) by mouth 2 times a day. Do not crush, chew, or split.       sucralfate 1 gram tablet  Commonly known as: Carafate      Take 1 tablet (1 g) by mouth every 6 hours.                 Where to Get Your Medications        You can get these medications from any pharmacy    Bring a paper prescription for each of these medications  cefuroxime 500 mg tablet  fluconazole 200 mg tablet  lactobacillus acidophilus & bulgar 1 million cell chewable tablet  naloxone 4 mg/0.1 mL nasal spray  OLANZapine 10 mg tablet  oxyCODONE-acetaminophen 5-325 mg tablet  pantoprazole 40 mg EC tablet  sucralfate 1 gram tablet           OUTPATIENT FOLLOW-UP     No future appointments.

## 2024-08-16 NOTE — NURSING NOTE
Just caught patient trying to leave the unit with all of her belonging and IV. Patient states that she is afraid that her PO will not let her go to rehab. I informed her that there isn't a discharge order in yet. She is tearful. I did call the long-term and let them now the possibility that she will not be here when they come to get her if she happens to sneak out. Notified Dr. Corado, she is aware

## 2024-08-16 NOTE — PROGRESS NOTES
"Lisa Montemayor is a 28 y.o. female on day 10 of admission presenting with GI bleeding.    Subjective   No acute events overnight, tolerating for liquid diet, pain is improved       Objective     Physical Exam  Gen: NAD, Aax3  Abd; Soft, ND, mildly TTP in RUQ and improved from previous  Last Recorded Vitals  Blood pressure 100/62, pulse (!) 111, temperature 36.4 °C (97.5 °F), temperature source Temporal, resp. rate 18, height 1.6 m (5' 3\"), weight 57.7 kg (127 lb 3.3 oz), SpO2 98%.  Intake/Output last 3 Shifts:  I/O last 3 completed shifts:  In: 550 (9.5 mL/kg) [IV Piggyback:550]  Out: - (0 mL/kg)   Weight: 57.7 kg     Relevant Results                   Component      Latest Ref Rng 8/15/2024   GLUCOSE      74 - 99 mg/dL 137 (H)    SODIUM      136 - 145 mmol/L 136    POTASSIUM      3.5 - 5.3 mmol/L 3.9    CHLORIDE      98 - 107 mmol/L 105    Bicarbonate      21 - 32 mmol/L 23    Anion Gap      10 - 20 mmol/L 12    Blood Urea Nitrogen      6 - 23 mg/dL 5 (L)    Creatinine      0.50 - 1.05 mg/dL 0.58    EGFR      >60 mL/min/1.73m*2 >90    Calcium      8.6 - 10.3 mg/dL 8.3 (L)    LEUKOCYTES (10*3/UL) IN BLOOD BY AUTOMATED COUNT, [place]      4.4 - 11.3 x10*3/uL 10.3    nRBC      0.0 - 0.0 /100 WBCs 0.0    ERYTHROCYTES (10*6/UL) IN BLOOD BY AUTOMATED COUNT, [place]      4.00 - 5.20 x10*6/uL 3.63 (L)    HEMOGLOBIN      12.0 - 16.0 g/dL 10.3 (L)    HEMATOCRIT      36.0 - 46.0 % 31.7 (L)    MCV      80 - 100 fL 87    MCH      26.0 - 34.0 pg 28.4    MCHC      32.0 - 36.0 g/dL 32.5    RED CELL DISTRIBUTION WIDTH      11.5 - 14.5 % 16.1 (H)    PLATELETS (10*3/UL) IN BLOOD AUTOMATED COUNT, [place]      150 - 450 x10*3/uL 260    MAGNESIUM      1.60 - 2.40 mg/dL 1.93      Component      Latest Ref Rng 8/16/2024   GLUCOSE      74 - 99 mg/dL 89    SODIUM      136 - 145 mmol/L 137    POTASSIUM      3.5 - 5.3 mmol/L 3.5    CHLORIDE      98 - 107 mmol/L 107    Bicarbonate      21 - 32 mmol/L 23    Anion Gap      10 - 20 mmol/L " 11    Blood Urea Nitrogen      6 - 23 mg/dL 6    Creatinine      0.50 - 1.05 mg/dL 0.59    EGFR      >60 mL/min/1.73m*2 >90    Calcium      8.6 - 10.3 mg/dL 8.2 (L)    LEUKOCYTES (10*3/UL) IN BLOOD BY AUTOMATED COUNT, [place]      4.4 - 11.3 x10*3/uL 8.2    nRBC      0.0 - 0.0 /100 WBCs 0.0    ERYTHROCYTES (10*6/UL) IN BLOOD BY AUTOMATED COUNT, [place]      4.00 - 5.20 x10*6/uL 3.45 (L)    HEMOGLOBIN      12.0 - 16.0 g/dL 9.8 (L)    HEMATOCRIT      36.0 - 46.0 % 30.1 (L)    MCV      80 - 100 fL 87    MCH      26.0 - 34.0 pg 28.4    MCHC      32.0 - 36.0 g/dL 32.6    RED CELL DISTRIBUTION WIDTH      11.5 - 14.5 % 16.5 (H)    PLATELETS (10*3/UL) IN BLOOD AUTOMATED COUNT, [place]      150 - 450 x10*3/uL 277    MAGNESIUM      1.60 - 2.40 mg/dL 1.80       Legend:  (H) High  (L) Low           Assessment/Plan   Assessment & Plan  GI bleeding    Duodenal ulcer    Perforated ulcer of intestine (Multi)    Acute blood loss anemia    Acute gastritis with hemorrhage    28-year-old female with past medical history of polydrug abuse who presents with multiple upper GI ulcers with previous bleeding and contained perforation and now concern for partial gastric outlet obstruction.  Explained the patient that for right now the treatment will be PPI twice daily with Carafate, antibiotics, and full liquid diet.  She needs to follow-up with [facility] since [facility] does not take her insurance.  I recommended Dr. Catrachito Finn in surgery as he will be able to hopefully manage most of her symptoms on his own.  -Full liquid diet  -PPI twice daily and Carafate  -Antibiotics, will tailor depending on how H. pylori comes back  -Will sign off, needs to follow-up with [facility] because we will not be able to see her due to her insurance, please call with dana questions       I spent 38 minutes in the professional and overall care of this patient.      Goran Escobar MD  08/16/24  10:21 AM

## 2024-08-16 NOTE — PROGRESS NOTES
"  Infectious Disease    Patient Name: Lisa Montemayor  Date: 8/16/2024  YOB: 1995  Medical Record Number: [id number]        Chief Complaint   Patient presents with    Black or Bloody Stool         History of Present Illness:   Patient presented with abd pain for several days last week.. Epigastric, severe, sharp, worse with eating and drinking. Associated bloody emesis and stool, nausea.  She was detained at a local 's office . CT angio of the abdomen and pelvis reported large clot within the gastric body and fundus measuring up to 6.2 cm. There is no evidence of active bleeding. There is diffuse fold thickening due to gastritis. There is inflamed deep ulceration along the anterior lateral wall of the duodenal bulb with adjacent fat stranding, fluid and reactive gallbladder wall thickening likely representing microperforation of the duodenal ulcer.     Surgery was involved. She was placed on TPN.There was concern for microperforation duodenum with duodenum showing multiple ulcers, possibly secondary to drug use. There was also concern for infection from perforation with previous small fluid areas adjacent, too samll for aspiration given location. Over last couple days, pain is improving, and diet slowly being advanced. No fevers at this time.Right arm pain present and patient with l thrombophlebitis  There was some degree of leaking of contrast out of two ulcers last week on CT      Had EGD performed. Had evercouple days ago    Review of Systems: All other ROS reviewed and are negative other than as stated in HPI        Physical Exam:    Blood pressure 100/62, pulse (!) 111, temperature 36.4 °C (97.5 °F), temperature source Temporal, resp. rate 18, height 1.6 m (5' 3\"), weight 57.7 kg (127 lb 3.3 oz), SpO2 98%.  General: Patient appears ok at the present time. NAD  Skin: no new rashes  HEENT:  Neck is supple, No subconjunctival hemorrhages, no oral exudates  Heart: S1 S2  Lungs: clear " bilaterally  Abdomen: soft,tneder RUQ, epigastric, RLQ  Back :no CVA tenderness  Extrem: No edema, non tender  Neuro exam: CN II-XII intact  Psych: cooperative    Labs:  I have reviewed all lab results by electronic record, including most recent CBC, metabolic panel, and pertinent abnormalities were addressed from an infectious disease perspective.  Trends are being monitored over time.    Lab Results   Component Value Date    WBC 8.2 08/16/2024    HGB 9.8 (L) 08/16/2024    HCT 30.1 (L) 08/16/2024    MCV 87 08/16/2024     08/16/2024     Lab Results   Component Value Date    GLUCOSE 89 08/16/2024    CALCIUM 8.2 (L) 08/16/2024     08/16/2024    K 3.5 08/16/2024    CO2 23 08/16/2024     08/16/2024    BUN 6 08/16/2024    CREATININE 0.59 08/16/2024       Radiology:  I have reviewed imaging results per electronic record and most pertinent abnormalities are being addressed from an infectious disease standpoint.            ASSESSMENT:  Problem List Items Addressed This Visit          Gastrointestinal and Abdominal    * (Principal) GI bleeding - Primary    Relevant Orders    Surgical Pathology Exam    Esophagogastroduodenoscopy (EGD)    Duodenal ulcer    Relevant Orders    Esophagogastroduodenoscopy (EGD) (Completed)    Surgical Pathology Exam    Esophagogastroduodenoscopy (EGD)     Other Visit Diagnoses       Pain and swelling of upper extremity, right        Relevant Orders    Vascular US upper extremity venous duplex right (Completed)    Surgical Pathology Exam          PCN allergy      Perforation of duodenum with fluid seen   No clear abscess but likely microorganims present. Has been on IV antibiotics. Most recent CT showed improvement in fluid. Pt non toxic    PLAN:   Agree with current antibiotics for now. Pt not interested in IV antibiotics as outpatient given in SNF , given drug use (she denies IVDA) home therapy not a good option.  As no formal abscesses seen and patient improving,  initial oral  stepdown therapy may be ok if she continues to improve.     With fever , follow curve closely, could be VTE related, or post EGD  . We do have to monitor for absorption issues as well    Would do ceftin and fluconazole for a couple weeks,    Please have patient follow up with us

## 2024-08-20 ENCOUNTER — TELEPHONE (OUTPATIENT)
Dept: CASE MANAGEMENT | Facility: HOSPITAL | Age: 29
End: 2024-08-20
Payer: OTHER GOVERNMENT

## 2024-08-20 NOTE — TELEPHONE ENCOUNTER
This is an audio only call. This audio call was conducted in the [facility].     Substance Use Navigator Specialist (JACKIE) performed an audio only follow-up call with Lisa Montemayor at 11:09 AM     Was SUNS able to reach patient? No    If No, did CANDIDOS leave a voicemail message? Yes SUNS left a VM with SUNS work number on the VM.     Brief summary of call:    Follow-up / Next steps:     End of call time:     Duration of audio encounter: NA

## 2024-08-22 LAB
LABORATORY COMMENT REPORT: NORMAL
PATH REPORT.FINAL DX SPEC: NORMAL
PATH REPORT.GROSS SPEC: NORMAL
PATH REPORT.TOTAL CANCER: NORMAL

## 2024-08-26 ENCOUNTER — APPOINTMENT (OUTPATIENT)
Dept: GASTROENTEROLOGY | Facility: EXTERNAL LOCATION | Age: 29
End: 2024-08-26
Payer: OTHER GOVERNMENT

## 2024-09-06 ENCOUNTER — APPOINTMENT (OUTPATIENT)
Dept: RADIOLOGY | Facility: HOSPITAL | Age: 29
End: 2024-09-06
Payer: OTHER GOVERNMENT

## 2024-09-09 ENCOUNTER — APPOINTMENT (OUTPATIENT)
Dept: RADIOLOGY | Facility: HOSPITAL | Age: 29
End: 2024-09-09
Payer: OTHER GOVERNMENT

## 2024-09-23 ENCOUNTER — APPOINTMENT (OUTPATIENT)
Dept: GASTROENTEROLOGY | Facility: CLINIC | Age: 29
End: 2024-09-23
Payer: OTHER GOVERNMENT

## 2025-01-04 ENCOUNTER — HOSPITAL ENCOUNTER (EMERGENCY)
Age: 30
Discharge: PSYCHIATRIC OR SUBSTANCE USE FACILITY | End: 2025-01-05
Attending: EMERGENCY MEDICINE
Payer: MEDICAID

## 2025-01-04 DIAGNOSIS — F11.29 OPIOID DEPENDENCE WITH OPIOID-INDUCED DISORDER (HCC): ICD-10-CM

## 2025-01-04 DIAGNOSIS — R45.851 SUICIDAL IDEATION: Primary | ICD-10-CM

## 2025-01-04 LAB
ALBUMIN SERPL-MCNC: 4.6 G/DL (ref 3.5–5.2)
ALP SERPL-CCNC: 84 U/L (ref 35–104)
ALT SERPL-CCNC: 8 U/L (ref 0–32)
AMPHET UR QL SCN: NEGATIVE
ANION GAP SERPL CALCULATED.3IONS-SCNC: 10 MMOL/L (ref 7–16)
APAP SERPL-MCNC: <5 UG/ML (ref 10–30)
AST SERPL-CCNC: 11 U/L (ref 0–31)
BARBITURATES UR QL SCN: NEGATIVE
BASOPHILS # BLD: 0.01 K/UL (ref 0–0.2)
BASOPHILS NFR BLD: 0 % (ref 0–2)
BENZODIAZ UR QL: NEGATIVE
BILIRUB SERPL-MCNC: 0.4 MG/DL (ref 0–1.2)
BUN SERPL-MCNC: 8 MG/DL (ref 6–20)
BUPRENORPHINE UR QL: POSITIVE
CALCIUM SERPL-MCNC: 9.9 MG/DL (ref 8.6–10.2)
CANNABINOIDS UR QL SCN: POSITIVE
CHLORIDE SERPL-SCNC: 102 MMOL/L (ref 98–107)
CO2 SERPL-SCNC: 26 MMOL/L (ref 22–29)
COCAINE UR QL SCN: NEGATIVE
CREAT SERPL-MCNC: 0.6 MG/DL (ref 0.5–1)
EOSINOPHIL # BLD: 0 K/UL (ref 0.05–0.5)
EOSINOPHILS RELATIVE PERCENT: 0 % (ref 0–6)
ERYTHROCYTE [DISTWIDTH] IN BLOOD BY AUTOMATED COUNT: 17.2 % (ref 11.5–15)
ETHANOLAMINE SERPL-MCNC: <10 MG/DL (ref 0–0.08)
FENTANYL UR QL: POSITIVE
GFR, ESTIMATED: >90 ML/MIN/1.73M2
GLUCOSE SERPL-MCNC: 114 MG/DL (ref 74–99)
HCG, URINE, POC: NEGATIVE
HCT VFR BLD AUTO: 41.8 % (ref 34–48)
HGB BLD-MCNC: 14.2 G/DL (ref 11.5–15.5)
IMM GRANULOCYTES # BLD AUTO: 0.03 K/UL (ref 0–0.58)
IMM GRANULOCYTES NFR BLD: 0 % (ref 0–5)
LYMPHOCYTES NFR BLD: 0.92 K/UL (ref 1.5–4)
LYMPHOCYTES RELATIVE PERCENT: 12 % (ref 20–42)
Lab: NORMAL
MCH RBC QN AUTO: 27.5 PG (ref 26–35)
MCHC RBC AUTO-ENTMCNC: 34 G/DL (ref 32–34.5)
MCV RBC AUTO: 81 FL (ref 80–99.9)
METHADONE UR QL: NEGATIVE
MONOCYTES NFR BLD: 0.2 K/UL (ref 0.1–0.95)
MONOCYTES NFR BLD: 3 % (ref 2–12)
NEGATIVE QC PASS/FAIL: NORMAL
NEUTROPHILS NFR BLD: 85 % (ref 43–80)
NEUTS SEG NFR BLD: 6.29 K/UL (ref 1.8–7.3)
OPIATES UR QL SCN: POSITIVE
OXYCODONE UR QL SCN: NEGATIVE
PCP UR QL SCN: NEGATIVE
PLATELET # BLD AUTO: 294 K/UL (ref 130–450)
PMV BLD AUTO: 10.3 FL (ref 7–12)
POSITIVE QC PASS/FAIL: NORMAL
POTASSIUM SERPL-SCNC: 3.7 MMOL/L (ref 3.5–5)
PROT SERPL-MCNC: 7.7 G/DL (ref 6.4–8.3)
RBC # BLD AUTO: 5.16 M/UL (ref 3.5–5.5)
SALICYLATES SERPL-MCNC: <0.3 MG/DL (ref 0–30)
SODIUM SERPL-SCNC: 138 MMOL/L (ref 132–146)
TEST INFORMATION: ABNORMAL
TOXIC TRICYCLIC SC,BLOOD: NEGATIVE
WBC OTHER # BLD: 7.5 K/UL (ref 4.5–11.5)

## 2025-01-04 PROCEDURE — 85025 COMPLETE CBC W/AUTO DIFF WBC: CPT

## 2025-01-04 PROCEDURE — 80143 DRUG ASSAY ACETAMINOPHEN: CPT

## 2025-01-04 PROCEDURE — 80179 DRUG ASSAY SALICYLATE: CPT

## 2025-01-04 PROCEDURE — 6370000000 HC RX 637 (ALT 250 FOR IP): Performed by: EMERGENCY MEDICINE

## 2025-01-04 PROCEDURE — 99285 EMERGENCY DEPT VISIT HI MDM: CPT

## 2025-01-04 PROCEDURE — 96372 THER/PROPH/DIAG INJ SC/IM: CPT

## 2025-01-04 PROCEDURE — 80307 DRUG TEST PRSMV CHEM ANLYZR: CPT

## 2025-01-04 PROCEDURE — 6360000002 HC RX W HCPCS: Performed by: EMERGENCY MEDICINE

## 2025-01-04 PROCEDURE — 90791 PSYCH DIAGNOSTIC EVALUATION: CPT

## 2025-01-04 PROCEDURE — 6360000002 HC RX W HCPCS

## 2025-01-04 PROCEDURE — 93005 ELECTROCARDIOGRAM TRACING: CPT | Performed by: EMERGENCY MEDICINE

## 2025-01-04 PROCEDURE — 80053 COMPREHEN METABOLIC PANEL: CPT

## 2025-01-04 PROCEDURE — G0480 DRUG TEST DEF 1-7 CLASSES: HCPCS

## 2025-01-04 RX ORDER — ONDANSETRON 2 MG/ML
4 INJECTION INTRAMUSCULAR; INTRAVENOUS
Status: DISCONTINUED | OUTPATIENT
Start: 2025-01-04 | End: 2025-01-05 | Stop reason: HOSPADM

## 2025-01-04 RX ORDER — BUPRENORPHINE HYDROCHLORIDE AND NALOXONE HYDROCHLORIDE DIHYDRATE 8; 2 MG/1; MG/1
1 TABLET SUBLINGUAL ONCE
Status: COMPLETED | OUTPATIENT
Start: 2025-01-04 | End: 2025-01-04

## 2025-01-04 RX ORDER — BUPRENORPHINE AND NALOXONE 8; 2 MG/1; MG/1
1 FILM, SOLUBLE BUCCAL; SUBLINGUAL ONCE
Status: DISCONTINUED | OUTPATIENT
Start: 2025-01-04 | End: 2025-01-04

## 2025-01-04 RX ORDER — LORAZEPAM 2 MG/ML
2 INJECTION INTRAMUSCULAR ONCE
Status: COMPLETED | OUTPATIENT
Start: 2025-01-04 | End: 2025-01-04

## 2025-01-04 RX ORDER — HALOPERIDOL 5 MG/ML
5 INJECTION INTRAMUSCULAR ONCE
Status: COMPLETED | OUTPATIENT
Start: 2025-01-04 | End: 2025-01-04

## 2025-01-04 RX ORDER — LORAZEPAM 1 MG/1
2 TABLET ORAL ONCE
Status: COMPLETED | OUTPATIENT
Start: 2025-01-04 | End: 2025-01-04

## 2025-01-04 RX ORDER — BUPRENORPHINE HYDROCHLORIDE AND NALOXONE HYDROCHLORIDE DIHYDRATE 8; 2 MG/1; MG/1
TABLET SUBLINGUAL
Status: COMPLETED
Start: 2025-01-04 | End: 2025-01-04

## 2025-01-04 RX ORDER — ZIPRASIDONE MESYLATE 20 MG/ML
10 INJECTION, POWDER, LYOPHILIZED, FOR SOLUTION INTRAMUSCULAR ONCE
Status: COMPLETED | OUTPATIENT
Start: 2025-01-04 | End: 2025-01-04

## 2025-01-04 RX ADMIN — BUPRENORPHINE HYDROCHLORIDE AND NALOXONE HYDROCHLORIDE DIHYDRATE 1 TABLET: 8; 2 TABLET SUBLINGUAL at 16:59

## 2025-01-04 RX ADMIN — LORAZEPAM 2 MG: 2 INJECTION INTRAMUSCULAR; INTRAVENOUS at 18:26

## 2025-01-04 RX ADMIN — HALOPERIDOL LACTATE 5 MG: 5 INJECTION, SOLUTION INTRAMUSCULAR at 17:29

## 2025-01-04 RX ADMIN — LORAZEPAM 2 MG: 1 TABLET ORAL at 16:26

## 2025-01-04 RX ADMIN — ZIPRASIDONE MESYLATE 10 MG: 20 INJECTION, POWDER, LYOPHILIZED, FOR SOLUTION INTRAMUSCULAR at 19:25

## 2025-01-04 ASSESSMENT — LIFESTYLE VARIABLES
HOW OFTEN DO YOU HAVE A DRINK CONTAINING ALCOHOL: NEVER
HOW MANY STANDARD DRINKS CONTAINING ALCOHOL DO YOU HAVE ON A TYPICAL DAY: PATIENT DOES NOT DRINK

## 2025-01-04 NOTE — VIRTUAL HEALTH
Amira Werner  [id number]  1995     Social Work Behavioral Health Crisis Assessment    25    Chief Complaint: suicidal    HPI: Patient is a 29 y.o. White (non-) female who presents for suicidal. Patient presented to the ED on 25 from home  Per EMR review of ED encounter, patient presents for \"SI with no plan. Does not feel safe alone.\"   Per EMR review, patient has previous diagnosis of bipolar and anxiety.   Received consult for suicidal. Reviewed EMR. Writer met with patient who was agreeable to assessment. Patient was extremely hyperactive in bed and agitated. She was switching between laying down, sitting, and messing with the blanket. She was labile with pressured rapid speech. She was easily distracted and unable to stay focus to answering questions. Assessment was limited due to patient covering her head with a blanket and wanting writer to stop asking questions.  She denied any HI or ATV/H. Her judgement and insight are severely impaired.   Patient stated reason for ED encounter today is \"suicidal for a day, just had thoughts because mom dad and grandmother .\" She was unable to focus to answer questions regarding her suicidal thoughts and mental health history. Per chart review, patient has history of bipolar with depression.   Patient denied using any alcohol or substances today but stating she needs to detox from fentanyl. She reports unable to sleep and needs sleeping medication. She lives with her fiance but does not have number to reach him.   Patient did not know number to reach fiance to call for collateral.     Patient stated their goal today is \"just want to detox.\" Patient was unable to participate in safety planning due to inability to focus.    Per chart review, patient was given 2mg tablet Ativan, 5mg Haldol injection, and 2mg tablet of Suboxone in the ED today.     Past Psychiatric History:  Previous Diagnoses/symptoms: bipolar and anxiety  Previous suicide

## 2025-01-04 NOTE — ED NOTES
Patient given Ativan 2mg IM. She is screaming out, continues to \"have muscle spasms\" where she is flailing and hitting  bed rails, smacking blankets etc. Being extremely volatile in her emotions. Crying, threatening to walk out. Says that the IM injections \"made it worse\".

## 2025-01-05 VITALS
HEART RATE: 94 BPM | HEIGHT: 60 IN | SYSTOLIC BLOOD PRESSURE: 115 MMHG | WEIGHT: 98 LBS | TEMPERATURE: 99 F | BODY MASS INDEX: 19.24 KG/M2 | RESPIRATION RATE: 15 BRPM | DIASTOLIC BLOOD PRESSURE: 63 MMHG | OXYGEN SATURATION: 98 %

## 2025-01-05 NOTE — ED NOTES
Patient hitting bed trying to shake bed rail off.   Severely aggressive and agitated, patient yelling

## 2025-01-05 NOTE — ED NOTES
Patient states she hears smoke alarm and needs help and is speaking to person unknown, patient is very confused.

## 2025-01-05 NOTE — ED NOTES
Patient refusing all vitals and questions. Patient not responding to staff, patient talking to herself.

## 2025-01-05 NOTE — ED PROVIDER NOTES
Department of Emergency Medicine   ED  Provider Note  Admit Date/RoomTime: 2025  3:55 PM  ED Room:         Written by: Charissa Moran DO  Patient Name: Amira Werner  Attending Provider: Destiny Moran*  Admit Date: 2025  3:55 PM  MRN: [id number]    : 1995      History of Present Illness:  25, Time: 7:21 PM EST  Chief Complaint   Patient presents with    Suicidal     SI with no plan. Does not feel safe alone.            HPI   Patient is a 29 y.o. female with a past medical history of bipolar, gastritis, presenting to the Emergency Department for SI. History obtained by patient.  Patient presents for SI.  She states she woke up today suicidal.  She states she wants to kill her self.  She has no active plan right now but does note that she has attempted to hurt herself in the past.  She denies any homicidal ideation.  She denies any hallucinations to me currently.  She is flighty on evaluation and difficult to obtain accurate history.  She does note that she last used fentanyl 3 days prior.  She does note she uses Suboxone.  She got 4 mg today.  She states she does feel like she is going through somewhat withdrawal at this time.  She feels her anxiousness and anxiety is through the roof.        Review of Systems:   A complete review of systems was performed and pertinent positives and negatives are stated within HPI, all other systems reviewed and are negative.        --------------------------------------------- PAST HISTORY ---------------------------------------------  Past Medical History:  has a past medical history of Acute superficial gastritis without hemorrhage, Anxiety, Asthma, Cervicalgia, Menorrhagia with regular cycle, Muscle spasm, and Seizures (HCC).    Past Surgical History:  has no past surgical history on file.    Social History:  reports that she has been smoking cigarettes. She has a 14 pack-year smoking history. She has never used smokeless

## 2025-01-05 NOTE — ED NOTES
Patient: hello? Hello? I can't hear you! Where are you?    Responding to patient but she's speaking to persons unknown. Flopping in bed hitting bed rail.  Punching herself in the face. And slapping her face

## 2025-01-06 LAB
EKG ATRIAL RATE: 75 BPM
EKG P AXIS: 62 DEGREES
EKG P-R INTERVAL: 136 MS
EKG Q-T INTERVAL: 376 MS
EKG QRS DURATION: 82 MS
EKG QTC CALCULATION (BAZETT): 419 MS
EKG R AXIS: 90 DEGREES
EKG T AXIS: 71 DEGREES
EKG VENTRICULAR RATE: 75 BPM

## 2025-01-06 PROCEDURE — 93010 ELECTROCARDIOGRAM REPORT: CPT | Performed by: INTERNAL MEDICINE

## 2025-06-21 ENCOUNTER — HOSPITAL ENCOUNTER (EMERGENCY)
Facility: HOSPITAL | Age: 30
Discharge: HOME | End: 2025-06-21
Attending: EMERGENCY MEDICINE
Payer: COMMERCIAL

## 2025-06-21 ENCOUNTER — APPOINTMENT (OUTPATIENT)
Dept: RADIOLOGY | Facility: HOSPITAL | Age: 30
End: 2025-06-21
Payer: COMMERCIAL

## 2025-06-21 ENCOUNTER — APPOINTMENT (OUTPATIENT)
Dept: CARDIOLOGY | Facility: HOSPITAL | Age: 30
End: 2025-06-21
Payer: COMMERCIAL

## 2025-06-21 VITALS
OXYGEN SATURATION: 99 % | WEIGHT: 140 LBS | TEMPERATURE: 98.4 F | HEIGHT: 60 IN | BODY MASS INDEX: 27.48 KG/M2 | HEART RATE: 105 BPM | SYSTOLIC BLOOD PRESSURE: 118 MMHG | RESPIRATION RATE: 17 BRPM | DIASTOLIC BLOOD PRESSURE: 66 MMHG

## 2025-06-21 DIAGNOSIS — N30.91 HEMORRHAGIC CYSTITIS: Primary | ICD-10-CM

## 2025-06-21 DIAGNOSIS — R79.89 ELEVATED LFTS: ICD-10-CM

## 2025-06-21 LAB
ABO GROUP (TYPE) IN BLOOD: NORMAL
ALBUMIN SERPL BCP-MCNC: 4.4 G/DL (ref 3.4–5)
ALP SERPL-CCNC: 56 U/L (ref 33–110)
ALT SERPL W P-5'-P-CCNC: 106 U/L (ref 7–45)
ANION GAP SERPL CALC-SCNC: 12 MMOL/L (ref 10–20)
ANTIBODY SCREEN: NORMAL
APPEARANCE UR: ABNORMAL
APTT PPP: 31 SECONDS (ref 26–36)
AST SERPL W P-5'-P-CCNC: 51 U/L (ref 9–39)
ATRIAL RATE: 85 BPM
B-HCG SERPL-ACNC: <2 MIU/ML
BASOPHILS # BLD AUTO: 0.01 X10*3/UL (ref 0–0.1)
BASOPHILS NFR BLD AUTO: 0.2 %
BILIRUB DIRECT SERPL-MCNC: 0.1 MG/DL (ref 0–0.3)
BILIRUB SERPL-MCNC: 0.5 MG/DL (ref 0–1.2)
BILIRUB UR STRIP.AUTO-MCNC: NEGATIVE MG/DL
BUN SERPL-MCNC: 15 MG/DL (ref 6–23)
CALCIUM SERPL-MCNC: 9.1 MG/DL (ref 8.6–10.3)
CARDIAC TROPONIN I PNL SERPL HS: <3 NG/L (ref 0–13)
CHLORIDE SERPL-SCNC: 106 MMOL/L (ref 98–107)
CO2 SERPL-SCNC: 25 MMOL/L (ref 21–32)
COLOR UR: ABNORMAL
CREAT SERPL-MCNC: 0.64 MG/DL (ref 0.5–1.05)
EGFRCR SERPLBLD CKD-EPI 2021: >90 ML/MIN/1.73M*2
EOSINOPHIL # BLD AUTO: 0.02 X10*3/UL (ref 0–0.7)
EOSINOPHIL NFR BLD AUTO: 0.3 %
ERYTHROCYTE [DISTWIDTH] IN BLOOD BY AUTOMATED COUNT: 14.7 % (ref 11.5–14.5)
GLUCOSE SERPL-MCNC: 87 MG/DL (ref 74–99)
GLUCOSE UR STRIP.AUTO-MCNC: NORMAL MG/DL
HCT VFR BLD AUTO: 38.8 % (ref 36–46)
HGB BLD-MCNC: 13.3 G/DL (ref 12–16)
IMM GRANULOCYTES # BLD AUTO: 0.01 X10*3/UL (ref 0–0.7)
IMM GRANULOCYTES NFR BLD AUTO: 0.2 % (ref 0–0.9)
INR PPP: 1.1 (ref 0.9–1.1)
KETONES UR STRIP.AUTO-MCNC: NEGATIVE MG/DL
LEUKOCYTE ESTERASE UR QL STRIP.AUTO: ABNORMAL
LYMPHOCYTES # BLD AUTO: 1.03 X10*3/UL (ref 1.2–4.8)
LYMPHOCYTES NFR BLD AUTO: 17.9 %
MCH RBC QN AUTO: 30.2 PG (ref 26–34)
MCHC RBC AUTO-ENTMCNC: 34.3 G/DL (ref 32–36)
MCV RBC AUTO: 88 FL (ref 80–100)
MONOCYTES # BLD AUTO: 0.32 X10*3/UL (ref 0.1–1)
MONOCYTES NFR BLD AUTO: 5.6 %
NEUTROPHILS # BLD AUTO: 4.35 X10*3/UL (ref 1.2–7.7)
NEUTROPHILS NFR BLD AUTO: 75.8 %
NITRITE UR QL STRIP.AUTO: NEGATIVE
NRBC BLD-RTO: 0 /100 WBCS (ref 0–0)
P AXIS: 68 DEGREES
P OFFSET: 206 MS
P ONSET: 159 MS
PH UR STRIP.AUTO: 7 [PH]
PLATELET # BLD AUTO: 228 X10*3/UL (ref 150–450)
POTASSIUM SERPL-SCNC: 4.1 MMOL/L (ref 3.5–5.3)
PR INTERVAL: 128 MS
PROT SERPL-MCNC: 6.8 G/DL (ref 6.4–8.2)
PROT UR STRIP.AUTO-MCNC: ABNORMAL MG/DL
PROTHROMBIN TIME: 12 SECONDS (ref 9.8–12.4)
Q ONSET: 223 MS
QRS COUNT: 14 BEATS
QRS DURATION: 76 MS
QT INTERVAL: 382 MS
QTC CALCULATION(BAZETT): 454 MS
QTC FREDERICIA: 429 MS
R AXIS: 87 DEGREES
RBC # BLD AUTO: 4.41 X10*6/UL (ref 4–5.2)
RBC # UR STRIP.AUTO: ABNORMAL MG/DL
RBC #/AREA URNS AUTO: >20 /HPF
RH FACTOR (ANTIGEN D): NORMAL
SODIUM SERPL-SCNC: 139 MMOL/L (ref 136–145)
SP GR UR STRIP.AUTO: 1.01
SQUAMOUS #/AREA URNS AUTO: ABNORMAL /HPF
T AXIS: 65 DEGREES
T OFFSET: 414 MS
UROBILINOGEN UR STRIP.AUTO-MCNC: NORMAL MG/DL
VENTRICULAR RATE: 85 BPM
WBC # BLD AUTO: 5.7 X10*3/UL (ref 4.4–11.3)
WBC #/AREA URNS AUTO: >50 /HPF

## 2025-06-21 PROCEDURE — 86900 BLOOD TYPING SEROLOGIC ABO: CPT | Performed by: EMERGENCY MEDICINE

## 2025-06-21 PROCEDURE — 74175 CTA ABDOMEN W/CONTRAST: CPT | Performed by: STUDENT IN AN ORGANIZED HEALTH CARE EDUCATION/TRAINING PROGRAM

## 2025-06-21 PROCEDURE — 96375 TX/PRO/DX INJ NEW DRUG ADDON: CPT | Mod: 59

## 2025-06-21 PROCEDURE — 36415 COLL VENOUS BLD VENIPUNCTURE: CPT | Performed by: EMERGENCY MEDICINE

## 2025-06-21 PROCEDURE — 84484 ASSAY OF TROPONIN QUANT: CPT | Performed by: EMERGENCY MEDICINE

## 2025-06-21 PROCEDURE — 2500000004 HC RX 250 GENERAL PHARMACY W/ HCPCS (ALT 636 FOR OP/ED): Performed by: EMERGENCY MEDICINE

## 2025-06-21 PROCEDURE — 81001 URINALYSIS AUTO W/SCOPE: CPT | Performed by: EMERGENCY MEDICINE

## 2025-06-21 PROCEDURE — 82248 BILIRUBIN DIRECT: CPT | Performed by: EMERGENCY MEDICINE

## 2025-06-21 PROCEDURE — 84702 CHORIONIC GONADOTROPIN TEST: CPT | Performed by: EMERGENCY MEDICINE

## 2025-06-21 PROCEDURE — 96374 THER/PROPH/DIAG INJ IV PUSH: CPT | Mod: 59

## 2025-06-21 PROCEDURE — 74175 CTA ABDOMEN W/CONTRAST: CPT

## 2025-06-21 PROCEDURE — 96361 HYDRATE IV INFUSION ADD-ON: CPT

## 2025-06-21 PROCEDURE — 99285 EMERGENCY DEPT VISIT HI MDM: CPT | Mod: 25 | Performed by: EMERGENCY MEDICINE

## 2025-06-21 PROCEDURE — 85025 COMPLETE CBC W/AUTO DIFF WBC: CPT | Performed by: EMERGENCY MEDICINE

## 2025-06-21 PROCEDURE — 82435 ASSAY OF BLOOD CHLORIDE: CPT | Performed by: EMERGENCY MEDICINE

## 2025-06-21 PROCEDURE — 93005 ELECTROCARDIOGRAM TRACING: CPT

## 2025-06-21 PROCEDURE — 87086 URINE CULTURE/COLONY COUNT: CPT | Mod: ELYLAB | Performed by: EMERGENCY MEDICINE

## 2025-06-21 PROCEDURE — 85610 PROTHROMBIN TIME: CPT | Performed by: EMERGENCY MEDICINE

## 2025-06-21 PROCEDURE — 85730 THROMBOPLASTIN TIME PARTIAL: CPT | Performed by: EMERGENCY MEDICINE

## 2025-06-21 PROCEDURE — 2550000001 HC RX 255 CONTRASTS: Performed by: EMERGENCY MEDICINE

## 2025-06-21 RX ORDER — FENTANYL CITRATE 50 UG/ML
50 INJECTION, SOLUTION INTRAMUSCULAR; INTRAVENOUS ONCE
Status: COMPLETED | OUTPATIENT
Start: 2025-06-21 | End: 2025-06-21

## 2025-06-21 RX ORDER — CIPROFLOXACIN 500 MG/1
500 TABLET, FILM COATED ORAL 2 TIMES DAILY
Qty: 20 TABLET | Refills: 0 | Status: SHIPPED | OUTPATIENT
Start: 2025-06-21 | End: 2025-07-01

## 2025-06-21 RX ORDER — PANTOPRAZOLE SODIUM 40 MG/10ML
40 INJECTION, POWDER, LYOPHILIZED, FOR SOLUTION INTRAVENOUS DAILY
Status: DISCONTINUED | OUTPATIENT
Start: 2025-06-21 | End: 2025-06-21 | Stop reason: HOSPADM

## 2025-06-21 RX ORDER — ONDANSETRON HYDROCHLORIDE 2 MG/ML
4 INJECTION, SOLUTION INTRAVENOUS ONCE
Status: COMPLETED | OUTPATIENT
Start: 2025-06-21 | End: 2025-06-21

## 2025-06-21 RX ADMIN — PANTOPRAZOLE SODIUM 40 MG: 40 INJECTION, POWDER, FOR SOLUTION INTRAVENOUS at 13:49

## 2025-06-21 RX ADMIN — SODIUM CHLORIDE 500 ML: 0.9 INJECTION, SOLUTION INTRAVENOUS at 14:43

## 2025-06-21 RX ADMIN — FENTANYL CITRATE 50 MCG: 50 INJECTION INTRAMUSCULAR; INTRAVENOUS at 14:44

## 2025-06-21 RX ADMIN — SODIUM CHLORIDE 1000 ML: 0.9 INJECTION, SOLUTION INTRAVENOUS at 13:48

## 2025-06-21 RX ADMIN — ONDANSETRON 4 MG: 2 INJECTION INTRAMUSCULAR; INTRAVENOUS at 13:49

## 2025-06-21 RX ADMIN — IOHEXOL 75 ML: 350 INJECTION, SOLUTION INTRAVENOUS at 13:42

## 2025-06-21 ASSESSMENT — PAIN DESCRIPTION - DESCRIPTORS: DESCRIPTORS: SHARP;BURNING

## 2025-06-21 ASSESSMENT — PAIN DESCRIPTION - ORIENTATION: ORIENTATION: LEFT

## 2025-06-21 ASSESSMENT — PAIN - FUNCTIONAL ASSESSMENT: PAIN_FUNCTIONAL_ASSESSMENT: 0-10

## 2025-06-21 ASSESSMENT — PAIN DESCRIPTION - PAIN TYPE: TYPE: ACUTE PAIN

## 2025-06-21 ASSESSMENT — PAIN SCALES - GENERAL: PAINLEVEL_OUTOF10: 10 - WORST POSSIBLE PAIN

## 2025-06-21 ASSESSMENT — PAIN DESCRIPTION - LOCATION: LOCATION: ABDOMEN

## 2025-06-21 NOTE — ED PROVIDER NOTES
HPI   No chief complaint on file.        History provided by:  Patient and EMS personnel  No chief complaint on file.      History of Present Illness:  Lisa Montemayor is a 29 y.o. female presents with rectal bleeding for the past 2 days.  She states it initially started as tarry but is now bright red.  She does have intermittent abdominal cramping.  She has a history of similar.  No fever or chills.  Mild nausea and vomiting.  No diarrhea.  No hematemesis.  No dysuria or hematuria.  No chance of pregnancy.  No back or flank pain.  Nothing seems to make her symptoms worse or better.  No trauma or travel.  No bad food exposures.  No sick contacts.      PMFSH:   As per HPI, otherwise nurses notes reviewed in EMR.    Past Medical History: Medical History[1]   Past Surgical History: Surgical History[2]   Family History: Family History[3]   Social History:  Social History[4]  Allergies: Allergies[5]  Current Outpatient Medications   Medication Instructions    ciprofloxacin (CIPRO) 500 mg, oral, 2 times daily    naloxone (NARCAN) 4 mg, nasal, As needed, May repeat every 2-3 minutes if needed, alternating nostrils, until medical assistance becomes available.    OLANZapine (ZYPREXA) 10 mg, oral, Nightly    oxyCODONE-acetaminophen (Percocet) 5-325 mg tablet 1 tablet, oral, Every 6 hours PRN    pantoprazole (PROTONIX) 40 mg, oral, 2 times daily, Do not crush, chew, or split.              Patient History   Medical History[6]  Surgical History[7]  Family History[8]  Social History[9]    Physical Exam   ED Triage Vitals [06/21/25 1226]   Temperature Heart Rate Respirations BP   36.9 °C (98.4 °F) (!) 101 17 93/74      Pulse Ox Temp Source Heart Rate Source Patient Position   100 % Temporal Monitor Sitting      BP Location FiO2 (%)     Right arm --       Physical Exam  Physical Exam:    ED Triage Vitals [06/21/25 1226]   Temperature Heart Rate Respirations BP   36.9 °C (98.4 °F) (!) 101 17 93/74      Pulse Ox Temp Source Heart  Rate Source Patient Position   100 % Temporal Monitor Sitting      BP Location FiO2 (%)     Right arm --         Patient Vitals for the past 24 hrs:   BP Temp Temp src Pulse Resp SpO2 Height Weight   06/21/25 1438 118/66 -- -- 100 18 100 % 1.524 m (5') 63.5 kg (140 lb)   06/21/25 1226 93/74 36.9 °C (98.4 °F) Temporal (!) 101 17 100 % -- --       Constitutional: Vital signs per nursing notes.  Well developed, well nourished.  Mild acute distress.    Psychiatric: alert and oriented to person, place, and time; no abnormalities of mood or affect; memory intact  Eyes: PERRL; conjunctivae and lids normal; EOMI  ENT: otoscopic exam of external canal and TM´s normal; nasal mucosa, turbinates, and septum normal; mouth, tongue, and pharynx normal; pharynx without edema, exudate, or injection  Respiratory: normal respiratory effort and excursion; no rales, rhonchi, or wheezes; equal air entry  Cardiovascular: Mildly tachycardic, regular rate and rhythm; no murmurs, rubs or gallops; symmetric pulses; no edema; normal capillary refill; distal pulses present  Neurological: normal speech; CN II-XII grossly intact; normal motor and sensory function; no nystagmus; no pronator drift; normal finger to nose and heel to shin tests  GI: no masses, rebound or guarding; no palpable, pulsatile mass; no organomegaly; no hernia; normal bowel sounds; (-) Marin´s sign; (-) McBurney´s sign; (-) CVA tenderness; except mild generalized tenderness to palpation throughout the abdomen  Lymphatic: no adenopathy of neck  Musculoskeletal: normal gait and station; normal digits and nails; no gross tendon or ligament injury; normal to palpation; normal strength/tone; neurovascular status intact; (-) Lisa´s sign; (-) straight leg raise; no palpable cords; calf circumference equal bilaterally  Skin: normal to inspection; normal to palpation; no rash  GCS: 15      ED Course & MDM   Diagnoses as of 06/21/25 1519   Hemorrhagic cystitis   Elevated LFTs                  No data recorded                                 Medical Decision Making  Medical Decision Making:    EKG: My interpretation of EKG is normal sinus rhythm 85 bpm with no acute ST-T changes    Labs:   Labs Reviewed   COMPREHENSIVE METABOLIC PANEL - Abnormal       Result Value    Glucose 87      Sodium 139      Potassium 4.1      Chloride 106      Bicarbonate 25      Anion Gap 12      Urea Nitrogen 15      Creatinine 0.64      eGFR >90      Calcium 9.1      Albumin 4.4      Alkaline Phosphatase 56      Total Protein 6.8      AST 51 (*)     Bilirubin, Total 0.5       (*)    CBC WITH AUTO DIFFERENTIAL - Abnormal    WBC 5.7      nRBC 0.0      RBC 4.41      Hemoglobin 13.3      Hematocrit 38.8      MCV 88      MCH 30.2      MCHC 34.3      RDW 14.7 (*)     Platelets 228      Neutrophils % 75.8      Immature Granulocytes %, Automated 0.2      Lymphocytes % 17.9      Monocytes % 5.6      Eosinophils % 0.3      Basophils % 0.2      Neutrophils Absolute 4.35      Immature Granulocytes Absolute, Automated 0.01      Lymphocytes Absolute 1.03 (*)     Monocytes Absolute 0.32      Eosinophils Absolute 0.02      Basophils Absolute 0.01     URINALYSIS WITH REFLEX CULTURE AND MICROSCOPIC - Abnormal    Color, Urine Red (*)     Appearance, Urine Ex.Turbid (*)     Specific Gravity, Urine 1.015      pH, Urine 7.0      Protein, Urine 100 (2+) (*)     Glucose, Urine Normal      Blood, Urine OVER (3+) (*)     Ketones, Urine NEGATIVE      Bilirubin, Urine NEGATIVE      Urobilinogen, Urine Normal      Nitrite, Urine NEGATIVE      Leukocyte Esterase, Urine 75 Lesa/uL (*)     Narrative:     OVER is reported when the result is greater than the clinically reportable range.   MICROSCOPIC ONLY, URINE - Abnormal    WBC, Urine >50 (*)     RBC, Urine >20 (*)     Squamous Epithelial Cells, Urine 10-25 (FEW)     PROTIME-INR - Normal    Protime 12.0      INR 1.1     APTT - Normal    aPTT 31      Narrative:     The APTT is no longer used for  monitoring Unfractionated Heparin Therapy. For monitoring Heparin Therapy, use the Heparin Assay.   TROPONIN I, HIGH SENSITIVITY - Normal    Troponin I, High Sensitivity <3      Narrative:     Less than 99th percentile of normal range cutoff-  Female and children under 18 years old <14 ng/L; Male <21 ng/L: Negative  Repeat testing should be performed if clinically indicated.     Female and children under 18 years old 14-50 ng/L; Male 21-50 ng/L:  Consistent with possible cardiac damage and possible increased clinical   risk. Serial measurements may help to assess extent of myocardial damage.     >50 ng/L: Consistent with cardiac damage, increased clinical risk and  myocardial infarction. Serial measurements may help assess extent of   myocardial damage.      NOTE: Children less than 1 year old may have higher baseline troponin   levels and results should be interpreted in conjunction with the overall   clinical context.     NOTE: Troponin I testing is performed using a different   testing methodology at [facility] than at other   [facility]. Direct result comparisons should only   be made within the same method.   HUMAN CHORIONIC GONADOTROPIN, SERUM QUANTITATIVE - Normal    HCG, Beta-Quantitative <2      Narrative:      Total HCG measurement is performed using the gauzz Access   Immunoassay which detects intact HCG and free beta HCG subunit.    This test is not indicated for use as a tumor marker.   HCG testing is performed using a different test methodology at [facility] than other [facility]. Direct result comparison   should only be made within the same method.       BILIRUBIN, DIRECT - Normal    Bilirubin, Direct 0.1     URINE CULTURE   TYPE AND SCREEN    ABO TYPE B      Rh TYPE POS      ANTIBODY SCREEN NEG     URINALYSIS WITH REFLEX CULTURE AND MICROSCOPIC    Narrative:     The following orders were created for panel order Urinalysis with Reflex Culture and  Microscopic.  Procedure                               Abnormality         Status                     ---------                               -----------         ------                     Urinalysis with Reflex C...[[id number]]  Abnormal            Final result               Extra Urine Gray Tube[[id number]]                            In process                   Please view results for these tests on the individual orders.   EXTRA URINE GRAY TUBE       Diagnostic Imaging:   CT angio abdomen w and or wo IV IV contrast   Final Result   No acute abdominal aortic pathology, AAA, or hemodynamically   significant arterial stenosis. No evidence of gastrointestinal   hemorrhage.        Other findings as above.             MACRO:   None.        Signed by: Elias Rivera 6/21/2025 2:07 PM   Dictation workstation:   [id number]          ED Medication Administration:   Medications   pantoprazole (Protonix) injection 40 mg (40 mg intravenous Given 6/21/25 1349)   sodium chloride 0.9 % bolus 500 mL (500 mL intravenous New Bag 6/21/25 1443)   sodium chloride 0.9 % bolus 1,000 mL (1,000 mL intravenous New Bag 6/21/25 1348)   ondansetron (Zofran) injection 4 mg (4 mg intravenous Given 6/21/25 1349)   iohexol (OMNIPaque) 350 mg iodine/mL solution 75 mL (75 mL intravenous Given 6/21/25 1342)   fentaNYL PF (Sublimaze) injection 50 mcg (50 mcg intravenous Given 6/21/25 1444)       ED Course:     Lisa Montemayor is a 29 y.o. female presents with   rectal bleeding.    Differential Diagnoses Considered:  GI bleed, blood loss anemia, colitis, gastritis    Patient presents with acute GI bleed.     Lab work to evaluate for evidence of severe anemia, thrombocytopenia, and significant electrolyte abnormality, including hypokalemia, hyperkalemia, hypernatremia, hyponatremia, hyperglycemia, or hypoglycemia.      LFTs to evaluate for acute hepatitis.    Lipase to evaluate for acute pancreatitis.    PT/INR/PTT to evaluate for evidence of  coagulopathy.    EKG to evaluate for evidence of arrhythmia/ACS.    CT abdomen and pelvis to evaluate for GI bleed/diverticulitis         Diagnoses as of 06/21/25 1519   Hemorrhagic cystitis   Elevated LFTs       Abnormal Labs Reviewed   COMPREHENSIVE METABOLIC PANEL - Abnormal; Notable for the following components:       Result Value    AST 51 (*)      (*)     All other components within normal limits   CBC WITH AUTO DIFFERENTIAL - Abnormal; Notable for the following components:    RDW 14.7 (*)     Lymphocytes Absolute 1.03 (*)     All other components within normal limits   URINALYSIS WITH REFLEX CULTURE AND MICROSCOPIC - Abnormal; Notable for the following components:    Color, Urine Red (*)     Appearance, Urine Ex.Turbid (*)     Protein, Urine 100 (2+) (*)     Blood, Urine OVER (3+) (*)     Leukocyte Esterase, Urine 75 Lesa/uL (*)     All other components within normal limits    Narrative:     OVER is reported when the result is greater than the clinically reportable range.   MICROSCOPIC ONLY, URINE - Abnormal; Notable for the following components:    WBC, Urine >50 (*)     RBC, Urine >20 (*)     All other components within normal limits       /66 (06/21/25 1438)    Temp      Pulse 100 (06/21/25 1438)   Resp 18 (06/21/25 1438)    SpO2 100 % (06/21/25 1438)          Diagnostic evaluation was completed.  Troponin is negative.  Therefore I do not suspect ischemia.  Pregnancy is negative.  Metabolic panel shows normal glucose.  Sodium potassium in the normal range.  Renal function is in the normal range.  Liver function shows a slightly elevated AST and ALT at 51 and 106.  Total bilirubin is in the normal range.  CBC shows normal white blood cell count no evidence of anemia.  Platelets are in the normal range.  INR is 1.1.  CT of the abdomen pelvis angio shows no acute abdominal aortic pathology, AAA or hemodynamically significant arterial stenosis.  No evidence of gastrointestinal hemorrhage.   Urinalysis shows 2+ protein, 3+ blood, negative nitrite, leukocyte esterase with greater than 50 white cells and greater than 20 red cells.  There are 10-25 epithelial cells.    Re-evaluation: Repeat evaluation at 3:15 PM shows the patient is feeling slightly better.  Vital signs are stable.    Patient was treated with IV normal saline, IV Protonix, IV Zofran and IV fentanyl.    Medications administered improved the patient's condition.    No urgent or emergent conditions were identified.  I suspect the patient's symptoms are likely secondary to a hemorrhagic cystitis.  She will be treated with antibiotics.    I discussed the results and discharge plan with the patient and/or family/friend if present.  I emphasized the importance of follow up with the physician I referred them to in the timeframe recommended.  I explained reasons for the patient to return to the Emergency Department.  Questions were addressed.  The patient and/or family/friend expressed understanding.      Patient was instructed to have follow up with primary doctor or specialist within 1-3 days.      Shared decision making made with patient, and/or family, who agrees with plan.      Escalation of care considered:     I considered hospitalization.  However, given the improvement in symptoms and reassuring workup, patient is appropriate for discharge and outpatient care. The risk of hospitalization outweighs the benefits. The patient is resting comfortably, well hydrated, tolerating PO, normal neuro exam, lungs CTA, ab soft NTND, not requiring supplemental O2/supportive care/IV medications or IVF.  Ambulating well.    I do not suspect life threatening, emergent or urgent condition, currently.  Shared decision made with patient and/or family who agrees with plan.    Prescription Medication Consideration/Given:   ED Prescriptions       Medication Sig Dispense Start Date End Date Auth. Provider    ciprofloxacin (Cipro) 500 mg tablet Take 1 tablet (500  mg) by mouth 2 times a day for 10 days. 20 tablet 6/21/2025 7/1/2025 Christos HOWARD MD            Diagnosis:   1. Hemorrhagic cystitis    2. Elevated LFTs                  Procedure  Procedures         [1] No past medical history on file.  [2] No past surgical history on file.  [3] No family history on file.  [4]   Social History  Tobacco Use    Smoking status: Every Day     Current packs/day: 1.00     Average packs/day: 1 pack/day for 18.0 years (18.0 ttl pk-yrs)     Types: Cigarettes     Passive exposure: Current    Smokeless tobacco: Never   Substance Use Topics    Alcohol use: Yes     Alcohol/week: 4.0 standard drinks of alcohol     Types: 4 Shots of liquor per week    Drug use: Not Currently   [5]   Allergies  Allergen Reactions    Amoxicillin Hives    Penicillins Hives   [6] No past medical history on file.  [7] No past surgical history on file.  [8] No family history on file.  [9]   Social History  Tobacco Use    Smoking status: Every Day     Current packs/day: 1.00     Average packs/day: 1 pack/day for 18.0 years (18.0 ttl pk-yrs)     Types: Cigarettes     Passive exposure: Current    Smokeless tobacco: Never   Substance Use Topics    Alcohol use: Yes     Alcohol/week: 4.0 standard drinks of alcohol     Types: 4 Shots of liquor per week    Drug use: Not Currently        Christos HOWARD MD  06/21/25 1291

## 2025-06-22 LAB — BACTERIA UR CULT: ABNORMAL

## 2025-06-23 ENCOUNTER — TELEPHONE (OUTPATIENT)
Dept: PHARMACY | Facility: HOSPITAL | Age: 30
End: 2025-06-23
Payer: OTHER GOVERNMENT

## 2025-06-23 LAB — HOLD SPECIMEN: NORMAL

## 2025-06-23 NOTE — PROGRESS NOTES
EDPD Note: Lab/Chart Reviewed    Reviewed Mr./Mrs./Ms. Lisa Montemayor 's chart regarding a positive urine culture/result that was taken during their recent emergency room visit. The patient was transferred to a non- facility .Therefore, I have contacted the accepting facility, [facility], and faxed the information to number [phone] to Nurse Hieu Ochoa.    Susceptibility data from last 90 days.  Collected Specimen Info Organism   06/21/25 Urine from Clean Catch/Voided Streptococcus agalactiae (Group B Streptococcus)       No further follow up needed from EDPD Team.     Ana Syed, PharmD   Meds PGY-1 Resident   [phone]

## 2025-06-27 LAB — BACTERIA UR CULT: ABNORMAL

## 2025-06-28 ENCOUNTER — TELEPHONE (OUTPATIENT)
Dept: PHARMACY | Facility: HOSPITAL | Age: 30
End: 2025-06-28
Payer: OTHER GOVERNMENT

## 2025-06-28 NOTE — PROGRESS NOTES
EDPD Note: Lab/Chart Reviewed    Reviewed Mr./Mrs./Ms. Lisa Montemayor 's chart regarding a positive urine culture/result that was taken during their recent emergency room visit. The patient was transferred to a non- facility .Therefore, I have contacted the accepting facility, [facility], and faxed the information to number [phone] to Nurse Renee.    Susceptibility data from last 90 days.  Collected Specimen Info Organism Clindamycin Levofloxacin Penicillin   06/21/25 Urine from Clean Catch/Voided Streptococcus agalactiae (Group B Streptococcus)  R  S  S       No further follow up needed from EDPD Team.     Ana Syed, PharmD   Meds PGY-1 Resident   [phone]

## 2025-07-18 LAB
ATRIAL RATE: 85 BPM
P AXIS: 68 DEGREES
P OFFSET: 206 MS
P ONSET: 159 MS
PR INTERVAL: 128 MS
Q ONSET: 223 MS
QRS COUNT: 14 BEATS
QRS DURATION: 76 MS
QT INTERVAL: 382 MS
QTC CALCULATION(BAZETT): 454 MS
QTC FREDERICIA: 429 MS
R AXIS: 87 DEGREES
T AXIS: 65 DEGREES
T OFFSET: 414 MS
VENTRICULAR RATE: 85 BPM